# Patient Record
Sex: FEMALE | Race: WHITE | NOT HISPANIC OR LATINO | Employment: OTHER | URBAN - NONMETROPOLITAN AREA
[De-identification: names, ages, dates, MRNs, and addresses within clinical notes are randomized per-mention and may not be internally consistent; named-entity substitution may affect disease eponyms.]

---

## 2017-01-10 ENCOUNTER — OFFICE VISIT (OUTPATIENT)
Dept: CARDIOLOGY | Facility: CLINIC | Age: 67
End: 2017-01-10

## 2017-01-10 VITALS
BODY MASS INDEX: 27.14 KG/M2 | WEIGHT: 159 LBS | DIASTOLIC BLOOD PRESSURE: 72 MMHG | HEART RATE: 71 BPM | HEIGHT: 64 IN | SYSTOLIC BLOOD PRESSURE: 140 MMHG

## 2017-01-10 DIAGNOSIS — I25.10 CORONARY ARTERY DISEASE INVOLVING NATIVE CORONARY ARTERY OF NATIVE HEART WITHOUT ANGINA PECTORIS: ICD-10-CM

## 2017-01-10 DIAGNOSIS — I10 ESSENTIAL HYPERTENSION: ICD-10-CM

## 2017-01-10 DIAGNOSIS — R07.82 INTERCOSTAL PAIN: Primary | ICD-10-CM

## 2017-01-10 DIAGNOSIS — E78.5 HYPERLIPIDEMIA, UNSPECIFIED HYPERLIPIDEMIA TYPE: ICD-10-CM

## 2017-01-10 DIAGNOSIS — R06.00 DYSPNEA, UNSPECIFIED TYPE: ICD-10-CM

## 2017-01-10 PROCEDURE — 93000 ELECTROCARDIOGRAM COMPLETE: CPT | Performed by: INTERNAL MEDICINE

## 2017-01-10 PROCEDURE — 99213 OFFICE O/P EST LOW 20 MIN: CPT | Performed by: INTERNAL MEDICINE

## 2017-01-10 RX ORDER — PANTOPRAZOLE SODIUM 40 MG/1
40 TABLET, DELAYED RELEASE ORAL 2 TIMES DAILY
COMMUNITY

## 2017-01-10 NOTE — PROGRESS NOTES
Fouzia Maldonado  66 y.o. female    01/10/2017  1. Intercostal pain    2. Coronary artery disease involving native coronary artery of native heart without angina pectoris    3. Hyperlipidemia, unspecified hyperlipidemia type    4. Essential hypertension    5. Dyspnea, unspecified type        History of Present Illness: Reason is her routine follow-up with her change in clinical conditions    66 years old female patient with history of for nonobstructive coronary artery disease documented by cardiac catheterization hypertension hyperlipidemia and history of osteoarthritis on appropriate medical management.  The patient to got admitted to [facility] then transferred to the [facility] facility for evaluations of GI bleeding and pancreatitis.  She got a couple unit of packed RBC as she found a significant anemic.  Forward last month or 2 the patient's significant symptoms of fatigability and shortness of breath with a mild exertional activity also complaining of chest pain even though pedis to be atypical from clinical descriptions.  She denies any orthopnea PND denies any nauseous vomiting diarrhea.  Denies any polydipsia polyuria.  Denies any intermittent claudication.  She had significance spinal arthritis with spinal stenosis.  Last echo in 2014 reported abnormal left and a systolic function with diastolic dysfunction and moderately dilated left atrium with mild tricuspid regurgitation with right ventricle systolic pressure proximate 30 mmHg.        SUBJECTIVE    Allergies   Allergen Reactions   • Amoxicillin Rash   • Darvon [Propoxyphene] Rash         Past Medical History   Diagnosis Date   • Coronary atherosclerosis of native coronary artery    • Hyperlipidemia    • Hypertension    • Nonrheumatic mitral valve disorder    • Pancreatitis    • Precordial pain    • Preprocedural cardiovascular examination          Past Surgical History   Procedure Laterality Date   • Cardiac catheterization     • Knee  surgery     • Hysterectomy     • Tubal abdominal ligation           Family History   Problem Relation Age of Onset   • Heart disease Mother    • Cancer Mother    • Heart disease Father          Social History     Social History   • Marital status:      Spouse name: N/A   • Number of children: N/A   • Years of education: N/A     Occupational History   • Not on file.     Social History Main Topics   • Smoking status: Former Smoker   • Smokeless tobacco: Never Used   • Alcohol use No   • Drug use: No   • Sexual activity: Defer     Other Topics Concern   • Not on file     Social History Narrative         Current Outpatient Prescriptions   Medication Sig Dispense Refill   • amLODIPine (NORVASC) 5 MG tablet Take 5 mg by mouth Daily.     • aspirin 81 MG chewable tablet Chew 81 mg Daily.     • enalapril (VASOTEC) 5 MG tablet Take 5 mg by mouth 2 (Two) Times a Day.     • Flaxseed, Linseed, (FLAXSEED OIL) 1000 MG capsule Take  by mouth.     • gabapentin (NEURONTIN) 300 MG capsule Take 300 mg by mouth 3 (Three) Times a Day.     • gemfibrozil (LOPID) 600 MG tablet Take 600 mg by mouth Daily.     • hydrochlorothiazide (MICROZIDE) 12.5 MG capsule Take 12.5 mg by mouth Daily.     • meloxicam (MOBIC) 15 MG tablet Take 15 mg by mouth Daily.     • omeprazole (priLOSEC) 20 MG capsule Take 20 mg by mouth Daily.     • pantoprazole (PROTONIX) 40 MG EC tablet Take 40 mg by mouth Daily.     • PARoxetine (PAXIL) 20 MG tablet Take 20 mg by mouth Every Morning.     • pravastatin (PRAVACHOL) 40 MG tablet Take 40 mg by mouth Daily.     • ranolazine (RANEXA) 500 MG 12 hr tablet Take  by mouth. 1 tablet each morning and 2 tablets QHS     • sucralfate (CARAFATE) 1 G tablet TAKE 1 TABLET FOUR TIMES DAILY 360 tablet 3   • vitamin D (ERGOCALCIFEROL) 90339 UNITS capsule capsule Take 50,000 Units by mouth 1 (One) Time Per Week.       No current facility-administered medications for this visit.          OBJECTIVE    Visit Vitals   • /72   •  "Pulse 71   • Ht 64\" (162.6 cm)   • Wt 159 lb (72.1 kg)   • BMI 27.29 kg/m2           Review of Systems     Constitutional:  Denies recent weight loss, weight gain, fever or chills, no change in exercise tolerance     HENT:  Denies any hearing loss, epistaxis, hoarseness, or difficulty speaking.     Eyes: Wears eyeglasses or contact lenses     Respiratory:   dyspnea with exertion     Cardiovascular: SEE HPI    Gastrointestinal:  Denies change in bowel habits, dyspepsia, ulcer disease, hematochezia, or melena.     Endocrine: Negative for cold intolerance, heat intolerance, polydipsia, polyphagia and polyuria. Denies any history of weight change, heat/cold intolerance, polydipsia, polyuria     Genitourinary: Negative.      Musculoskeletal: Denies any history of arthritic symptoms or back problems     Skin:  Denies any change in hair or nails, rashes, or skin lesions.     Allergic/Immunologic: Negative.  Negative for environmental allergies, food allergies and immunocompromised state.     Neurological:  Denies any history of recurrent headaches, strokes, TIA, or seizure disorder.     Hematological: Denies any food allergies, seasonal allergies, bleeding disorders, or lymphadenopathy.     Psychiatric/Behavioral: Denies any history of depression, substance abuse, or change in cognitive function.         Physical Exam     Constitutional: Cooperative, alert and oriented, well-developed, well-nourished, in no acute distress.     HENT:   Head: Normocephalic, normal hair patterns, no masses or tenderness.  Ears, Nose, and Throat: No gross abnormalities. No pallor or cyanosis. Dentition good.   Eyes: EOMS intact, PERRL, conjunctivae and lids unremarkable. Fundoscopic exam and visual fields not performed.   Neck: No palpable masses or adenopathy, no thyromegaly, no JVD, carotid pulses are full and equal bilaterally and without  Bruits.     Cardiovascular: Regular rhythm, S1 and S2 normal, no S3 or S4. Apical impulse not " displaced. No murmurs, gallops, or rubs detected.     Pulmonary/Chest: Chest: normal symmetry, no tenderness to palpation, normal respiratory excursion, no intercostal retraction, no use of accessory muscles.            Pulmonary: Normal breath sounds. No rales or ronchi.    Abdominal: Abdomen soft, bowel sounds normoactive, no masses, no hepatosplenomegaly, non-tender, no bruits.     Musculoskeletal: No deformities, clubbing, cyanosis, erythema, or edema observed. There are no spinal abnormalities noted. Normal muscle strength and tone. Pulses full and equal in all extremities, no bruits auscultated.     Neurological: No gross motor or sensory deficits noted, affect appropriate, oriented to time, person, place.     Skin: Warm and dry to the touch, no apparent skin lesions or masses noted.     Psychiatric: She has a normal mood and affect. Her behavior is normal. Judgment and thought content normal.           ECG 12 Lead  Date/Time: 1/10/2017 3:25 PM  Performed by: JARRET LAI  Authorized by: JARRET LAI   Comparison: not compared with previous ECG   Rhythm: sinus rhythm  Clinical impression: normal ECG              Lab Results   Component Value Date    WBC 5.8 06/26/2016    HGB 6.3 (L) 06/26/2016    HCT 20.5 (L) 06/26/2016    MCV 64.9 (L) 06/26/2016     06/26/2016     Lab Results   Component Value Date    GLUCOSE 89 06/26/2016    BUN 17 06/26/2016    CREATININE 1.1 (H) 06/26/2016    CO2 24 06/26/2016    CALCIUM 9.4 06/26/2016    ALBUMIN 3.9 06/26/2016    AST 16 06/26/2016    ALT 27 06/26/2016     No results found for: CHOL  No results found for: TRIG  No results found for: HDL  No results found for: LDLCALC  No results found for: LDL  No results found for: HDLLDLRATIO  No components found for: CHOLHDL  No results found for: HGBA1C  Lab Results   Component Value Date    TSH 2.63 03/03/2014           ASSESSMENT AND PLAN  Clinically there is no sign of any acute cardiac decompensation based the clinical  history physical finding.  No evidence of active ischemia.  EKG normal sinus rhythm.  Given the significant change in intracranial clinical condition and described above and history of nonobstructive coronary to disease with the resector hypertension hyperlipidemia seemed inappropriate to risk stratify with a Lexiscan Cardiolite given the significant osteoarthritis.  I will also get an echocardiographic study.  Given the history of GI bleed I will arrange a CBC and BMP.  At present the patient will continue amlodipine 5 mg and aspirin.  Vasotec for hypertension with good blood pressure control.  She is on gemfibrozil for hyperlipidemia and hydrochlorothiazide for hypertension.  Pantoprazole for gastritis and pravastatin for hyperlipidemia along with Ranexa with history of nonobstructive coronary artery disease.  We'll like to see her back within a month R depends on patient clinical condition and/or cardiac workup.    Diagnoses and all orders for this visit:    Intercostal pain  -     ECG 12 Lead  -     Regadenoson Stress Test With Myocardial Perfusion SPECT (Multi Study); Future  -     Basic Metabolic Panel    Coronary artery disease involving native coronary artery of native heart without angina pectoris  -     ECG 12 Lead  -     Adult Transthoracic Echo Complete  -     Regadenoson Stress Test With Myocardial Perfusion SPECT (Multi Study); Future  -     CBC (No Diff); Future    Hyperlipidemia, unspecified hyperlipidemia type    Essential hypertension  -     ECG 12 Lead  -     Adult Transthoracic Echo Complete  -     Regadenoson Stress Test With Myocardial Perfusion SPECT (Multi Study); Future    Dyspnea, unspecified type  -     ECG 12 Lead  -     Adult Transthoracic Echo Complete  -     Regadenoson Stress Test With Myocardial Perfusion SPECT (Multi Study); Future  -     CBC (No Diff); Future  -     Basic Metabolic Panel        Robbin Matthews MD  1/10/2017  3:20 PM

## 2017-01-10 NOTE — MR AVS SNAPSHOT
Fouzia Pryoralice Lyonss   1/10/2017 2:15 PM   Office Visit    Dept Phone:  [phone]   Encounter #:  [id number]    Provider:  Robbin Matthews MD   Department:  [facility] CARDIOLOGY                Your Full Care Plan              Your Updated Medication List          This list is accurate as of: 1/10/17  3:17 PM.  Always use your most recent med list.                amLODIPine 5 MG tablet   Commonly known as:  NORVASC       aspirin 81 MG chewable tablet       enalapril 5 MG tablet   Commonly known as:  VASOTEC       Flaxseed Oil 1000 MG capsule       gabapentin 300 MG capsule   Commonly known as:  NEURONTIN       hydrochlorothiazide 12.5 MG capsule   Commonly known as:  MICROZIDE       LOPID 600 MG tablet   Generic drug:  gemfibrozil       meloxicam 15 MG tablet   Commonly known as:  MOBIC       omeprazole 20 MG capsule   Commonly known as:  priLOSEC       pantoprazole 40 MG EC tablet   Commonly known as:  PROTONIX       PARoxetine 20 MG tablet   Commonly known as:  PAXIL       pravastatin 40 MG tablet   Commonly known as:  PRAVACHOL       ranolazine 500 MG 12 hr tablet   Commonly known as:  RANEXA       sucralfate 1 G tablet   Commonly known as:  CARAFATE   TAKE 1 TABLET FOUR TIMES DAILY       vitamin D 09207 UNITS capsule capsule   Commonly known as:  ERGOCALCIFEROL               You Were Diagnosed With        Codes Comments    Intercostal pain    -  Primary ICD-10-CM: R07.82  ICD-9-CM: 786.59     Coronary artery disease involving native coronary artery of native heart without angina pectoris     ICD-10-CM: I25.10  ICD-9-CM: 414.01     Hyperlipidemia, unspecified hyperlipidemia type     ICD-10-CM: E78.5  ICD-9-CM: 272.4     Essential hypertension     ICD-10-CM: I10  ICD-9-CM: 401.9     Dyspnea, unspecified type     ICD-10-CM: R06.00  ICD-9-CM: 786.09       Instructions     None    Patient Instructions History      Upcoming Appointments     Visit Type Date Time Department    "   OFFICE VISIT 1/10/2017  2:15 PM [facility]    RADIOLOGY 2017  8:00 AM [facility]    OFFICE VISIT 2017  2:00 PM [facility]      MyChart Signup     [facility] [employer] allows you to send messages to your doctor, view your test results, renew your prescriptions, schedule appointments, and more. To sign up, go to [employer] and click on the Sign Up Now link in the New User? box. Enter your [employer] Activation Code exactly as it appears below along with the last four digits of your Social Security Number and your Date of Birth () to complete the sign-up process. If you do not sign up before the expiration date, you must request a new code.    [employer] Activation Code: [email or web address]  Expires: 2017  3:17 PM    If you have questions, you can email [email or web address] or call [phone] to talk to our [employer] staff. Remember, [employer] is NOT to be used for urgent needs. For medical emergencies, dial 911.               Other Info from Your Visit           Your Appointments     2017  8:00 AM CST   RADIOLOGY with [facility] CARDIOLOGY (--)    [address]  [facility] [address] [phone]            2017  2:00 PM CST   Office Visit with Robbin Matthews MD   [facility] CARDIOLOGY (--)    [address]  [facility] [id number] [phone]           Arrive 15 minutes prior to appointment.              Allergies     Amoxicillin  Rash    Darvon [Propoxyphene]  Rash      Vital Signs     Blood Pressure Pulse Height Weight Body Mass Index Smoking Status    140/72 71 64\" (162.6 cm) 159 lb (72.1 kg) 27.29 kg/m2 Former Smoker      Problems and Diagnoses Noted     Coronary artery disease involving native coronary artery of native heart without angina pectoris    Shortness of breath    High blood pressure    High cholesterol or triglycerides    Chest " pain    Pancreas inflammation

## 2017-01-15 ENCOUNTER — RESULTS ENCOUNTER (OUTPATIENT)
Dept: CARDIOLOGY | Facility: CLINIC | Age: 67
End: 2017-01-15

## 2017-01-15 DIAGNOSIS — I25.10 CORONARY ARTERY DISEASE INVOLVING NATIVE CORONARY ARTERY OF NATIVE HEART WITHOUT ANGINA PECTORIS: ICD-10-CM

## 2017-01-15 DIAGNOSIS — R06.00 DYSPNEA, UNSPECIFIED TYPE: ICD-10-CM

## 2017-01-19 ENCOUNTER — TRANSCRIBE ORDERS (OUTPATIENT)
Dept: CARDIOLOGY | Facility: CLINIC | Age: 67
End: 2017-01-19

## 2017-01-19 DIAGNOSIS — R06.02 SHORTNESS OF BREATH: ICD-10-CM

## 2017-01-19 DIAGNOSIS — I10 ESSENTIAL HYPERTENSION: ICD-10-CM

## 2017-01-19 DIAGNOSIS — I25.10 CORONARY ARTERY DISEASE INVOLVING NATIVE CORONARY ARTERY OF NATIVE HEART WITHOUT ANGINA PECTORIS: Primary | ICD-10-CM

## 2017-01-20 ENCOUNTER — TRANSCRIBE ORDERS (OUTPATIENT)
Dept: CARDIOLOGY | Facility: CLINIC | Age: 67
End: 2017-01-20

## 2017-02-01 ENCOUNTER — APPOINTMENT (OUTPATIENT)
Dept: CARDIOLOGY | Facility: CLINIC | Age: 67
End: 2017-02-01

## 2017-02-01 DIAGNOSIS — R06.02 SOB (SHORTNESS OF BREATH) ON EXERTION: Primary | ICD-10-CM

## 2017-02-01 RX ADMIN — Medication 1 DOSE: at 10:49

## 2017-02-07 ENCOUNTER — DOCUMENTATION (OUTPATIENT)
Dept: CARDIOLOGY | Facility: CLINIC | Age: 67
End: 2017-02-07

## 2017-02-09 RX ORDER — MELOXICAM 15 MG/1
TABLET ORAL
Qty: 90 TABLET | Refills: 4 | Status: SHIPPED | OUTPATIENT
Start: 2017-02-09 | End: 2017-03-28 | Stop reason: HOSPADM

## 2017-03-20 ENCOUNTER — OFFICE VISIT (OUTPATIENT)
Dept: CARDIOLOGY | Facility: CLINIC | Age: 67
End: 2017-03-20

## 2017-03-20 ENCOUNTER — DOCUMENTATION (OUTPATIENT)
Dept: CARDIOLOGY | Facility: CLINIC | Age: 67
End: 2017-03-20

## 2017-03-20 VITALS
DIASTOLIC BLOOD PRESSURE: 100 MMHG | SYSTOLIC BLOOD PRESSURE: 180 MMHG | WEIGHT: 150 LBS | HEART RATE: 76 BPM | HEIGHT: 64 IN | BODY MASS INDEX: 25.61 KG/M2

## 2017-03-20 DIAGNOSIS — I10 ESSENTIAL HYPERTENSION: ICD-10-CM

## 2017-03-20 DIAGNOSIS — R06.00 DYSPNEA, UNSPECIFIED TYPE: Primary | ICD-10-CM

## 2017-03-20 DIAGNOSIS — R07.82 INTERCOSTAL PAIN: ICD-10-CM

## 2017-03-20 DIAGNOSIS — E78.5 HYPERLIPIDEMIA, UNSPECIFIED HYPERLIPIDEMIA TYPE: ICD-10-CM

## 2017-03-20 DIAGNOSIS — I34.0 NON-RHEUMATIC MITRAL REGURGITATION: ICD-10-CM

## 2017-03-20 DIAGNOSIS — I25.10 CORONARY ARTERY DISEASE INVOLVING NATIVE CORONARY ARTERY OF NATIVE HEART WITHOUT ANGINA PECTORIS: ICD-10-CM

## 2017-03-20 PROCEDURE — 99213 OFFICE O/P EST LOW 20 MIN: CPT | Performed by: INTERNAL MEDICINE

## 2017-03-20 RX ORDER — RANOLAZINE 500 MG/1
500 TABLET, EXTENDED RELEASE ORAL 2 TIMES DAILY
Qty: 270 TABLET | Refills: 3 | Status: SHIPPED | OUTPATIENT
Start: 2017-03-20 | End: 2018-07-17 | Stop reason: ALTCHOICE

## 2017-03-20 RX ORDER — AMLODIPINE BESYLATE 5 MG/1
5 TABLET ORAL DAILY
COMMUNITY
Start: 2017-03-20 | End: 2019-09-06

## 2017-03-20 NOTE — PROGRESS NOTES
Fouzia Maldonado  66 y.o. female    03/20/2017  1. Dyspnea, unspecified type    2. Coronary artery disease involving native coronary artery of native heart without angina pectoris    3. Intercostal pain    4. Hyperlipidemia, unspecified hyperlipidemia type    5. Essential hypertension    6. Non-rheumatic mitral regurgitation        History of Present Illness:  Routine follow-up    66 years old patient with history of hypertension hypertensive heart disease mild-to-moderate regurgitation recent echocardiographic study history of nonobstructive coronary to disease underwent recent stress test reported abnormal left and a systolic function no evidence of ischemia.  The patient had history of pancreatitis complicated with GI bleed and managed at [address] [place] [address].  She continued sick in the stomach with symptom of nauseous  dyspeptic symptom and and discomfort in the epigastric area.  She denies any typical chest pain.  Denies any orthopnea PND or become short-winded with a moderate level of physical activity possibility of physical deconditioning cannot be excluded.  Her blood pressure between the higher side.  She is taking the amlodipine and Vasotec.  She preferred to be evaluated regarding her GI symptom at [place]           SUBJECTIVE    Allergies   Allergen Reactions   • Amoxicillin Rash   • Darvon [Propoxyphene] Rash         Past Medical History   Diagnosis Date   • Coronary atherosclerosis of native coronary artery    • Hyperlipidemia    • Hypertension    • Nonrheumatic mitral valve disorder    • Pancreatitis    • Precordial pain    • Preprocedural cardiovascular examination          Past Surgical History   Procedure Laterality Date   • Cardiac catheterization     • Knee surgery     • Hysterectomy     • Tubal abdominal ligation           Family History   Problem Relation Age of Onset   • Heart disease Mother    • Cancer Mother    • Heart disease Father          Social History     Social History   • Marital  "status:      Spouse name: N/A   • Number of children: N/A   • Years of education: N/A     Occupational History   • Not on file.     Social History Main Topics   • Smoking status: Former Smoker   • Smokeless tobacco: Never Used   • Alcohol use No   • Drug use: No   • Sexual activity: Defer     Other Topics Concern   • Not on file     Social History Narrative         Current Outpatient Prescriptions   Medication Sig Dispense Refill   • amLODIPine (NORVASC) 5 MG tablet Take 5 mg by mouth Daily.     • aspirin 81 MG chewable tablet Chew 81 mg Daily.     • enalapril (VASOTEC) 5 MG tablet Take 5 mg by mouth 2 (Two) Times a Day.     • Flaxseed, Linseed, (FLAXSEED OIL) 1000 MG capsule Take  by mouth.     • gabapentin (NEURONTIN) 300 MG capsule Take 300 mg by mouth 3 (Three) Times a Day.     • gemfibrozil (LOPID) 600 MG tablet Take 600 mg by mouth Daily.     • hydrochlorothiazide (MICROZIDE) 12.5 MG capsule Take 12.5 mg by mouth Daily.     • meloxicam (MOBIC) 15 MG tablet TAKE 1 TABLET EVERY DAY WITH A MEAL 90 tablet 4   • omeprazole (priLOSEC) 20 MG capsule Take 20 mg by mouth Daily.     • pantoprazole (PROTONIX) 40 MG EC tablet Take 40 mg by mouth Daily.     • PARoxetine (PAXIL) 20 MG tablet Take 20 mg by mouth Every Morning.     • pravastatin (PRAVACHOL) 40 MG tablet Take 40 mg by mouth Daily.     • ranolazine (RANEXA) 500 MG 12 hr tablet Take  by mouth. 1 tablet each morning and 2 tablets QHS     • sucralfate (CARAFATE) 1 G tablet TAKE 1 TABLET FOUR TIMES DAILY 360 tablet 3   • vitamin D (ERGOCALCIFEROL) 55746 UNITS capsule capsule Take 50,000 Units by mouth 1 (One) Time Per Week.       No current facility-administered medications for this visit.          OBJECTIVE    Visit Vitals   • /100   • Pulse 76   • Ht 64\" (162.6 cm)   • Wt 150 lb (68 kg)   • BMI 25.75 kg/m2           Review of Systems     Constitutional:  Denies recent weight loss, weight gain, fever or chills, no change in exercise tolerance   "   HENT:  Denies any hearing loss, epistaxis, hoarseness, or difficulty speaking.     Eyes: Wears eyeglasses or contact lenses     Respiratory: dyspnea with exertion    Cardiovascular: See H&P    Gastrointestinal: SEE Hand P     Endocrine: Negative for cold intolerance, heat intolerance, polydipsia, polyphagia and polyuria. Denies any history of weight loss    Genitourinary: Negative.      Musculoskeletal: Denies any history of arthritic symptoms or back problems     Skin:  Denies any change in hair or nails, rashes, or skin lesions.     Allergic/Immunologic: Negative.  Negative for environmental allergies, food allergies and immunocompromised state.     Neurological:  Denies any history of recurrent headaches, strokes, TIA, or seizure disorder.     Hematological: Denies any food allergies, seasonal allergies, bleeding disorders, or lymphadenopathy.     Psychiatric/Behavioral: Denies any history of depression, substance abuse, or change in cognitive function.         Physical Exam     Constitutional: Cooperative, alert and oriented, well-developed, well-nourished, in no acute distress.     HENT:   Head: Normocephalic, normal hair patterns, no masses or tenderness.  Ears, Nose, and Throat: No gross abnormalities. No pallor or cyanosis. Dentition good.   Eyes: EOMS intact, PERRL, conjunctivae and lids unremarkable. Fundoscopic exam and visual fields not performed.   Neck: No palpable masses or adenopathy, no thyromegaly, no JVD, carotid pulses are full and equal bilaterally and without  Bruits.     Cardiovascular: Regular rhythm, S1 and S2 normal, no S3 or S4. Apical impulse not displaced. No murmurs, gallops, or rubs detected.     Pulmonary/Chest: Chest: normal symmetry, no tenderness to palpation, normal respiratory excursion, no intercostal retraction, no use of accessory muscles.            Pulmonary: Normal breath sounds. No rales or ronchi.    Abdominal: Abdomen soft, bowel sounds normoactive, no masses, no  hepatosplenomegaly, non-tender, no bruits.     Musculoskeletal: No deformities, clubbing, cyanosis, erythema, or edema observed. There are no spinal abnormalities noted. Normal muscle strength and tone. Pulses full and equal in all extremities, no bruits auscultated.     Neurological: No gross motor or sensory deficits noted, affect appropriate, oriented to time, person, place.     Skin: Warm and dry to the touch, no apparent skin lesions or masses noted.     Psychiatric: She has a normal mood and affect. Her behavior is normal. Judgment and thought content normal.         Procedures      Lab Results   Component Value Date    WBC 8.8 01/10/2017    HGB 8.0 (L) 01/10/2017    HCT 25.5 (L) 01/10/2017    MCV 68.7 (L) 01/10/2017     01/10/2017     Lab Results   Component Value Date    GLUCOSE 95 01/10/2017    BUN 18 01/10/2017    CREATININE 1.2 (H) 01/10/2017    CO2 25 01/10/2017    CALCIUM 9.2 01/10/2017    ALBUMIN 3.9 06/26/2016    AST 16 06/26/2016    ALT 27 06/26/2016     No results found for: CHOL  No results found for: TRIG  No results found for: HDL  No results found for: LDLCALC  No results found for: LDL  No results found for: HDLLDLRATIO  No components found for: CHOLHDL  No results found for: HGBA1C  Lab Results   Component Value Date    TSH 2.63 03/03/2014           ASSESSMENT AND PLAN  #1 history of nonobstructive coronary to disease within normal recent stress test #2 hypertension with hypertensive heart disease #3 mild to moderate mitral regurgitation.  #4 dyspnea on exertion may be multifactorial needed in etiology.  #5 history of for dyspepsia/gastritis with previous history of pancreatitis complicated with GI bleed    Clinically there wasn't enough for any acute cardiac decompensation based on the clinical history physical finding.  The shortness breath and admitted maybe multifactorial the possibility of physical deconditioning cannot be excluded.  100 mg complaint in the sickness in the  stomach/nauseous feeling and at present being treated with Carafate and Prilosec.  She is a resident of [facility] by pressure to be followed up with the GI and medicine well.  Contacted Dr. Esquivel's office.  Sent abnormal stress test normal left and a systolic function with mild-to-moderate mitral regurgitations.  Her blood pressures between the higher side I will also patient increase amlodipine from 5-10 mg and she will continue Vasotec 5 mg.  She will also continue HCl 2.5 mg Ranexa 500 mg twice a day and her stomach medication as before.  We'll lack to see her back in 6 month R depends on patient clinical conditions.    Diagnoses and all orders for this visit:    Dyspnea, unspecified type    Coronary artery disease involving native coronary artery of native heart without angina pectoris    Intercostal pain    Hyperlipidemia, unspecified hyperlipidemia type    Essential hypertension    Non-rheumatic mitral regurgitation        Robbin Matthews MD  3/20/2017  2:18 PM

## 2017-03-24 ENCOUNTER — DOCUMENTATION (OUTPATIENT)
Dept: CARDIOLOGY | Facility: CLINIC | Age: 67
End: 2017-03-24

## 2017-03-24 ENCOUNTER — APPOINTMENT (OUTPATIENT)
Dept: CT IMAGING | Facility: HOSPITAL | Age: 67
End: 2017-03-24

## 2017-03-24 ENCOUNTER — HOSPITAL ENCOUNTER (INPATIENT)
Facility: HOSPITAL | Age: 67
LOS: 4 days | Discharge: HOME | End: 2017-03-28
Attending: FAMILY MEDICINE | Admitting: FAMILY MEDICINE

## 2017-03-24 DIAGNOSIS — D62 ACUTE BLOOD LOSS ANEMIA: Primary | ICD-10-CM

## 2017-03-24 PROBLEM — M19.90 ARTHRITIS: Status: ACTIVE | Noted: 2017-03-24

## 2017-03-24 PROBLEM — R10.10 PAIN OF UPPER ABDOMEN: Status: ACTIVE | Noted: 2017-03-24

## 2017-03-24 LAB
ABO GROUP BLD: NORMAL
ALBUMIN SERPL-MCNC: 4.2 G/DL (ref 3.4–4.8)
ALBUMIN SERPL-MCNC: 4.2 G/DL (ref 3.4–4.8)
ALBUMIN/GLOB SERPL: 1.6 G/DL (ref 1.1–1.8)
ALBUMIN/GLOB SERPL: 1.7 G/DL (ref 1.1–1.8)
ALP SERPL-CCNC: 74 U/L (ref 38–126)
ALP SERPL-CCNC: 75 U/L (ref 38–126)
ALT SERPL W P-5'-P-CCNC: 16 U/L (ref 9–52)
ALT SERPL W P-5'-P-CCNC: 20 U/L (ref 9–52)
AMYLASE SERPL-CCNC: 85 U/L (ref 50–130)
ANION GAP SERPL CALCULATED.3IONS-SCNC: 11 MMOL/L (ref 5–15)
ANION GAP SERPL CALCULATED.3IONS-SCNC: 12 MMOL/L (ref 5–15)
ANISOCYTOSIS BLD QL: ABNORMAL
ANISOCYTOSIS BLD QL: NORMAL
AST SERPL-CCNC: 34 U/L (ref 14–36)
AST SERPL-CCNC: 41 U/L (ref 14–36)
BASOPHILS # BLD MANUAL: 0.06 10*3/MM3 (ref 0–0.2)
BASOPHILS NFR BLD AUTO: 1 % (ref 0–2)
BILIRUB SERPL-MCNC: 0.3 MG/DL (ref 0.2–1.3)
BILIRUB SERPL-MCNC: 0.3 MG/DL (ref 0.2–1.3)
BLD GP AB SCN SERPL QL: NEGATIVE
BUN BLD-MCNC: 13 MG/DL (ref 7–21)
BUN BLD-MCNC: 14 MG/DL (ref 7–21)
BUN/CREAT SERPL: 14 (ref 7–25)
BUN/CREAT SERPL: 15.2 (ref 7–25)
CALCIUM SPEC-SCNC: 8.9 MG/DL (ref 8.4–10.2)
CALCIUM SPEC-SCNC: 9 MG/DL (ref 8.4–10.2)
CHLORIDE SERPL-SCNC: 100 MMOL/L (ref 95–110)
CHLORIDE SERPL-SCNC: 101 MMOL/L (ref 95–110)
CO2 SERPL-SCNC: 26 MMOL/L (ref 22–31)
CO2 SERPL-SCNC: 26 MMOL/L (ref 22–31)
CREAT BLD-MCNC: 0.92 MG/DL (ref 0.5–1)
CREAT BLD-MCNC: 0.93 MG/DL (ref 0.5–1)
DEPRECATED RDW RBC AUTO: 40.8 FL (ref 36.4–46.3)
DEPRECATED RDW RBC AUTO: 41.3 FL (ref 36.4–46.3)
EOSINOPHIL # BLD MANUAL: 0.17 10*3/MM3 (ref 0–0.7)
EOSINOPHIL # BLD MANUAL: 0.58 10*3/MM3 (ref 0–0.7)
EOSINOPHIL NFR BLD MANUAL: 3 % (ref 0–7)
EOSINOPHIL NFR BLD MANUAL: 9 % (ref 0–7)
ERYTHROCYTE [DISTWIDTH] IN BLOOD BY AUTOMATED COUNT: 17.5 % (ref 11.5–14.5)
ERYTHROCYTE [DISTWIDTH] IN BLOOD BY AUTOMATED COUNT: 17.6 % (ref 11.5–14.5)
FERRITIN SERPL-MCNC: 4.8 NG/ML (ref 11.1–264)
FOLATE SERPL-MCNC: 8.93 NG/ML (ref 2.76–21)
GFR SERPL CREATININE-BSD FRML MDRD: 60 ML/MIN/1.73 (ref 60–104)
GFR SERPL CREATININE-BSD FRML MDRD: 61 ML/MIN/1.73 (ref 60–104)
GLOBULIN UR ELPH-MCNC: 2.5 GM/DL (ref 2.3–3.5)
GLOBULIN UR ELPH-MCNC: 2.6 GM/DL (ref 2.3–3.5)
GLUCOSE BLD-MCNC: 92 MG/DL (ref 60–100)
GLUCOSE BLD-MCNC: 93 MG/DL (ref 60–100)
HCT VFR BLD AUTO: 20.2 % (ref 35–45)
HCT VFR BLD AUTO: 20.6 % (ref 35–45)
HCT VFR BLD AUTO: 20.8 % (ref 35–45)
HGB BLD-MCNC: 6 G/DL (ref 12–15.5)
HGB BLD-MCNC: 6.1 G/DL (ref 12–15.5)
HGB RETIC QN: 17.7 PG (ref 30–38)
HOLD SPECIMEN: NORMAL
HOLD SPECIMEN: NORMAL
HYPOCHROMIA BLD QL: NORMAL
IMM RETICS NFR: 18.7 % (ref 3–15.9)
INR PPP: 0.98 (ref 0.8–1.2)
IRON 24H UR-MRATE: 10 MCG/DL (ref 37–170)
IRON SATN MFR SERPL: 2 % (ref 15–50)
LARGE PLATELETS: ABNORMAL
LIPASE SERPL-CCNC: 134 U/L (ref 23–300)
LYMPHOCYTES # BLD MANUAL: 1.35 10*3/MM3 (ref 0.6–4.2)
LYMPHOCYTES # BLD MANUAL: 1.38 10*3/MM3 (ref 0.6–4.2)
LYMPHOCYTES NFR BLD MANUAL: 21 % (ref 10–50)
LYMPHOCYTES NFR BLD MANUAL: 24 % (ref 10–50)
LYMPHOCYTES NFR BLD MANUAL: 6 % (ref 0–12)
LYMPHOCYTES NFR BLD MANUAL: 6 % (ref 0–12)
MCH RBC QN AUTO: 18.5 PG (ref 26.5–34)
MCH RBC QN AUTO: 18.8 PG (ref 26.5–34)
MCHC RBC AUTO-ENTMCNC: 29.3 G/DL (ref 31.4–36)
MCHC RBC AUTO-ENTMCNC: 29.7 G/DL (ref 31.4–36)
MCV RBC AUTO: 63 FL (ref 80–98)
MCV RBC AUTO: 63.3 FL (ref 80–98)
MICROCYTES BLD QL: ABNORMAL
MONOCYTES # BLD AUTO: 0.34 10*3/MM3 (ref 0–0.9)
MONOCYTES # BLD AUTO: 0.39 10*3/MM3 (ref 0–0.9)
NEUTROPHILS # BLD AUTO: 3.78 10*3/MM3 (ref 2–8.6)
NEUTROPHILS # BLD AUTO: 4.13 10*3/MM3 (ref 2–8.6)
NEUTROPHILS NFR BLD MANUAL: 64 % (ref 37–80)
NEUTROPHILS NFR BLD MANUAL: 66 % (ref 37–80)
NEUTS HYPERSEG # BLD: ABNORMAL 10*3/UL
PLATELET # BLD AUTO: 445 10*3/MM3 (ref 150–450)
PLATELET # BLD AUTO: 462 10*3/MM3 (ref 150–450)
PMV BLD AUTO: 8.9 FL (ref 8–12)
PMV BLD AUTO: 9.1 FL (ref 8–12)
POIKILOCYTOSIS BLD QL SMEAR: NORMAL
POTASSIUM BLD-SCNC: 3.8 MMOL/L (ref 3.5–5.1)
POTASSIUM BLD-SCNC: 3.9 MMOL/L (ref 3.5–5.1)
PROT SERPL-MCNC: 6.7 G/DL (ref 6.3–8.6)
PROT SERPL-MCNC: 6.8 G/DL (ref 6.3–8.6)
PROTHROMBIN TIME: 12.9 SECONDS (ref 11.1–15.3)
RBC # BLD AUTO: 3.19 10*6/MM3 (ref 3.77–5.16)
RBC # BLD AUTO: 3.3 10*6/MM3 (ref 3.77–5.16)
RETICS #: 0.06 10*6/MM3 (ref 0.03–0.12)
RETICS/RBC NFR AUTO: 1.93 % (ref 0.63–2.13)
RETICULOCYTE PRODUCTION INDEX: 0.4 % (ref 0.63–2.13)
RH BLD: NEGATIVE
SMALL PLATELETS BLD QL SMEAR: NORMAL
SODIUM BLD-SCNC: 137 MMOL/L (ref 137–145)
SODIUM BLD-SCNC: 139 MMOL/L (ref 137–145)
TIBC SERPL-MCNC: 422 MCG/DL (ref 265–497)
VIT B12 BLD-MCNC: 512 PG/ML (ref 239–931)
WBC MORPH BLD: NORMAL
WBC NRBC COR # BLD: 5.73 10*3/MM3 (ref 3.2–9.8)
WBC NRBC COR # BLD: 6.45 10*3/MM3 (ref 3.2–9.8)
WHOLE BLOOD HOLD SPECIMEN: NORMAL
WHOLE BLOOD HOLD SPECIMEN: NORMAL

## 2017-03-24 PROCEDURE — 36430 TRANSFUSION BLD/BLD COMPNT: CPT

## 2017-03-24 PROCEDURE — 25010000002 HYDRALAZINE PER 20 MG: Performed by: FAMILY MEDICINE

## 2017-03-24 PROCEDURE — 86901 BLOOD TYPING SEROLOGIC RH(D): CPT | Performed by: FAMILY MEDICINE

## 2017-03-24 PROCEDURE — 25010000002 ONDANSETRON PER 1 MG: Performed by: FAMILY MEDICINE

## 2017-03-24 PROCEDURE — 82728 ASSAY OF FERRITIN: CPT | Performed by: FAMILY MEDICINE

## 2017-03-24 PROCEDURE — 86900 BLOOD TYPING SEROLOGIC ABO: CPT | Performed by: FAMILY MEDICINE

## 2017-03-24 PROCEDURE — P9021 RED BLOOD CELLS UNIT: HCPCS

## 2017-03-24 PROCEDURE — 85025 COMPLETE CBC W/AUTO DIFF WBC: CPT | Performed by: FAMILY MEDICINE

## 2017-03-24 PROCEDURE — 85007 BL SMEAR W/DIFF WBC COUNT: CPT | Performed by: FAMILY MEDICINE

## 2017-03-24 PROCEDURE — 82150 ASSAY OF AMYLASE: CPT | Performed by: FAMILY MEDICINE

## 2017-03-24 PROCEDURE — 82607 VITAMIN B-12: CPT | Performed by: FAMILY MEDICINE

## 2017-03-24 PROCEDURE — 85046 RETICYTE/HGB CONCENTRATE: CPT | Performed by: FAMILY MEDICINE

## 2017-03-24 PROCEDURE — 99284 EMERGENCY DEPT VISIT MOD MDM: CPT

## 2017-03-24 PROCEDURE — 86900 BLOOD TYPING SEROLOGIC ABO: CPT

## 2017-03-24 PROCEDURE — 0 DIATRIZOATE MEGLUMINE & SODIUM PER 1 ML: Performed by: FAMILY MEDICINE

## 2017-03-24 PROCEDURE — 82746 ASSAY OF FOLIC ACID SERUM: CPT | Performed by: FAMILY MEDICINE

## 2017-03-24 PROCEDURE — 80053 COMPREHEN METABOLIC PANEL: CPT | Performed by: FAMILY MEDICINE

## 2017-03-24 PROCEDURE — 83690 ASSAY OF LIPASE: CPT | Performed by: FAMILY MEDICINE

## 2017-03-24 PROCEDURE — 85610 PROTHROMBIN TIME: CPT | Performed by: FAMILY MEDICINE

## 2017-03-24 PROCEDURE — 86850 RBC ANTIBODY SCREEN: CPT | Performed by: FAMILY MEDICINE

## 2017-03-24 PROCEDURE — 83540 ASSAY OF IRON: CPT | Performed by: FAMILY MEDICINE

## 2017-03-24 PROCEDURE — 83550 IRON BINDING TEST: CPT | Performed by: FAMILY MEDICINE

## 2017-03-24 PROCEDURE — 74177 CT ABD & PELVIS W/CONTRAST: CPT

## 2017-03-24 PROCEDURE — 86923 COMPATIBILITY TEST ELECTRIC: CPT

## 2017-03-24 RX ORDER — HYDRALAZINE HYDROCHLORIDE 20 MG/ML
20 INJECTION INTRAMUSCULAR; INTRAVENOUS ONCE
Status: COMPLETED | OUTPATIENT
Start: 2017-03-25 | End: 2017-03-24

## 2017-03-24 RX ORDER — ONDANSETRON 4 MG/1
4 TABLET, FILM COATED ORAL EVERY 6 HOURS PRN
Status: DISCONTINUED | OUTPATIENT
Start: 2017-03-24 | End: 2017-03-28 | Stop reason: HOSPADM

## 2017-03-24 RX ORDER — SODIUM CHLORIDE 0.9 % (FLUSH) 0.9 %
1-10 SYRINGE (ML) INJECTION AS NEEDED
Status: DISCONTINUED | OUTPATIENT
Start: 2017-03-24 | End: 2017-03-28 | Stop reason: HOSPADM

## 2017-03-24 RX ORDER — PRAVASTATIN SODIUM 40 MG
40 TABLET ORAL DAILY
Status: DISCONTINUED | OUTPATIENT
Start: 2017-03-25 | End: 2017-03-28 | Stop reason: HOSPADM

## 2017-03-24 RX ORDER — LABETALOL 100 MG/1
100 TABLET, FILM COATED ORAL EVERY 12 HOURS SCHEDULED
Status: DISCONTINUED | OUTPATIENT
Start: 2017-03-25 | End: 2017-03-28 | Stop reason: HOSPADM

## 2017-03-24 RX ORDER — ONDANSETRON 2 MG/ML
4 INJECTION INTRAMUSCULAR; INTRAVENOUS EVERY 6 HOURS PRN
Status: DISCONTINUED | OUTPATIENT
Start: 2017-03-24 | End: 2017-03-28 | Stop reason: HOSPADM

## 2017-03-24 RX ORDER — AMLODIPINE BESYLATE 10 MG/1
10 TABLET ORAL DAILY
Status: DISCONTINUED | OUTPATIENT
Start: 2017-03-25 | End: 2017-03-28 | Stop reason: HOSPADM

## 2017-03-24 RX ORDER — SODIUM CHLORIDE 0.9 % (FLUSH) 0.9 %
10 SYRINGE (ML) INJECTION AS NEEDED
Status: DISCONTINUED | OUTPATIENT
Start: 2017-03-24 | End: 2017-03-28 | Stop reason: HOSPADM

## 2017-03-24 RX ORDER — RANOLAZINE 500 MG/1
500 TABLET, EXTENDED RELEASE ORAL 2 TIMES DAILY
Status: DISCONTINUED | OUTPATIENT
Start: 2017-03-24 | End: 2017-03-28 | Stop reason: HOSPADM

## 2017-03-24 RX ORDER — LORAZEPAM 2 MG/ML
0.5 INJECTION INTRAMUSCULAR EVERY 6 HOURS PRN
Status: DISCONTINUED | OUTPATIENT
Start: 2017-03-24 | End: 2017-03-28 | Stop reason: HOSPADM

## 2017-03-24 RX ORDER — ASPIRIN 81 MG/1
81 TABLET, CHEWABLE ORAL DAILY
Status: DISCONTINUED | OUTPATIENT
Start: 2017-03-25 | End: 2017-03-28 | Stop reason: HOSPADM

## 2017-03-24 RX ORDER — FUROSEMIDE 10 MG/ML
40 INJECTION INTRAMUSCULAR; INTRAVENOUS ONCE
Status: DISCONTINUED | OUTPATIENT
Start: 2017-03-24 | End: 2017-03-28 | Stop reason: HOSPADM

## 2017-03-24 RX ORDER — FUROSEMIDE 10 MG/ML
20 INJECTION INTRAMUSCULAR; INTRAVENOUS ONCE
Status: COMPLETED | OUTPATIENT
Start: 2017-03-25 | End: 2017-03-25

## 2017-03-24 RX ORDER — SODIUM CHLORIDE 9 MG/ML
100 INJECTION, SOLUTION INTRAVENOUS CONTINUOUS
Status: DISCONTINUED | OUTPATIENT
Start: 2017-03-24 | End: 2017-03-28 | Stop reason: HOSPADM

## 2017-03-24 RX ORDER — HYDRALAZINE HYDROCHLORIDE 20 MG/ML
20 INJECTION INTRAMUSCULAR; INTRAVENOUS EVERY 6 HOURS PRN
Status: DISCONTINUED | OUTPATIENT
Start: 2017-03-24 | End: 2017-03-28 | Stop reason: HOSPADM

## 2017-03-24 RX ORDER — PANTOPRAZOLE SODIUM 40 MG/10ML
40 INJECTION, POWDER, LYOPHILIZED, FOR SOLUTION INTRAVENOUS EVERY 12 HOURS SCHEDULED
Status: COMPLETED | OUTPATIENT
Start: 2017-03-24 | End: 2017-03-27

## 2017-03-24 RX ORDER — SUCRALFATE 1 G/1
1 TABLET ORAL
Status: DISCONTINUED | OUTPATIENT
Start: 2017-03-25 | End: 2017-03-28 | Stop reason: HOSPADM

## 2017-03-24 RX ORDER — ONDANSETRON 4 MG/1
4 TABLET, ORALLY DISINTEGRATING ORAL EVERY 6 HOURS PRN
Status: DISCONTINUED | OUTPATIENT
Start: 2017-03-24 | End: 2017-03-28 | Stop reason: HOSPADM

## 2017-03-24 RX ORDER — PANTOPRAZOLE SODIUM 40 MG/10ML
80 INJECTION, POWDER, LYOPHILIZED, FOR SOLUTION INTRAVENOUS ONCE
Status: COMPLETED | OUTPATIENT
Start: 2017-03-24 | End: 2017-03-24

## 2017-03-24 RX ADMIN — LABETALOL HYDROCHLORIDE 100 MG: 100 TABLET, FILM COATED ORAL at 23:50

## 2017-03-24 RX ADMIN — HYDRALAZINE HYDROCHLORIDE 20 MG: 20 INJECTION INTRAMUSCULAR; INTRAVENOUS at 22:39

## 2017-03-24 RX ADMIN — RANOLAZINE 500 MG: 500 TABLET, FILM COATED, EXTENDED RELEASE ORAL at 22:38

## 2017-03-24 RX ADMIN — HYDRALAZINE HYDROCHLORIDE 20 MG: 20 INJECTION INTRAMUSCULAR; INTRAVENOUS at 23:45

## 2017-03-24 RX ADMIN — DIATRIZOATE MEGLUMINE AND DIATRIZOATE SODIUM 30 ML: 660; 100 LIQUID ORAL; RECTAL at 22:36

## 2017-03-24 RX ADMIN — PANTOPRAZOLE SODIUM 80 MG: 40 INJECTION, POWDER, FOR SOLUTION INTRAVENOUS at 19:36

## 2017-03-24 RX ADMIN — ONDANSETRON 4 MG: 2 INJECTION INTRAMUSCULAR; INTRAVENOUS at 23:30

## 2017-03-24 RX ADMIN — PANTOPRAZOLE SODIUM 40 MG: 40 INJECTION, POWDER, FOR SOLUTION INTRAVENOUS at 21:59

## 2017-03-24 NOTE — PROGRESS NOTES
Attempted to notify pt that Dr Esquivel's office will not accept her as a patient due to her establishment at [facility] in [place] at [facility].

## 2017-03-25 ENCOUNTER — APPOINTMENT (OUTPATIENT)
Dept: GENERAL RADIOLOGY | Facility: HOSPITAL | Age: 67
End: 2017-03-25

## 2017-03-25 ENCOUNTER — APPOINTMENT (OUTPATIENT)
Dept: ULTRASOUND IMAGING | Facility: HOSPITAL | Age: 67
End: 2017-03-25

## 2017-03-25 PROBLEM — R50.9 FEVER: Status: ACTIVE | Noted: 2017-03-25

## 2017-03-25 LAB
BASOPHILS # BLD AUTO: 0.02 10*3/MM3 (ref 0–0.2)
BASOPHILS NFR BLD AUTO: 0.2 % (ref 0–2)
BILIRUB UR QL STRIP: NEGATIVE
CLARITY UR: CLEAR
COLOR UR: YELLOW
CRP SERPL-MCNC: 0.6 MG/DL (ref 0–1)
D-LACTATE SERPL-SCNC: 0.8 MMOL/L (ref 0.5–2)
DEPRECATED RDW RBC AUTO: 49.7 FL (ref 36.4–46.3)
EOSINOPHIL # BLD AUTO: 0.05 10*3/MM3 (ref 0–0.7)
EOSINOPHIL NFR BLD AUTO: 0.5 % (ref 0–7)
ERYTHROCYTE [DISTWIDTH] IN BLOOD BY AUTOMATED COUNT: 20.5 % (ref 11.5–14.5)
GLUCOSE UR STRIP-MCNC: NEGATIVE MG/DL
HCT VFR BLD AUTO: 24.4 % (ref 35–45)
HGB BLD-MCNC: 7.8 G/DL (ref 12–15.5)
HGB UR QL STRIP.AUTO: NEGATIVE
HOLD SPECIMEN: NORMAL
IMM GRANULOCYTES # BLD: 0.02 10*3/MM3 (ref 0–0.02)
IMM GRANULOCYTES NFR BLD: 0.2 % (ref 0–0.5)
KETONES UR QL STRIP: NEGATIVE
LEUKOCYTE ESTERASE UR QL STRIP.AUTO: NEGATIVE
LYMPHOCYTES # BLD AUTO: 1.24 10*3/MM3 (ref 0.6–4.2)
LYMPHOCYTES NFR BLD AUTO: 13.1 % (ref 10–50)
Lab: NORMAL
MCH RBC QN AUTO: 21.2 PG (ref 26.5–34)
MCHC RBC AUTO-ENTMCNC: 32 G/DL (ref 31.4–36)
MCV RBC AUTO: 66.3 FL (ref 80–98)
MONOCYTES # BLD AUTO: 0.44 10*3/MM3 (ref 0–0.9)
MONOCYTES NFR BLD AUTO: 4.6 % (ref 0–12)
NEUTROPHILS # BLD AUTO: 7.72 10*3/MM3 (ref 2–8.6)
NEUTROPHILS NFR BLD AUTO: 81.4 % (ref 37–80)
NITRITE UR QL STRIP: NEGATIVE
PH UR STRIP.AUTO: 7 [PH] (ref 5–9)
PLATELET # BLD AUTO: 374 10*3/MM3 (ref 150–450)
PMV BLD AUTO: 9.5 FL (ref 8–12)
PROT UR QL STRIP: NEGATIVE
RBC # BLD AUTO: 3.68 10*6/MM3 (ref 3.77–5.16)
SP GR UR STRIP: 1.02 (ref 1–1.03)
UROBILINOGEN UR QL STRIP: NORMAL
WBC NRBC COR # BLD: 9.49 10*3/MM3 (ref 3.2–9.8)
WHOLE BLOOD HOLD SPECIMEN: NORMAL

## 2017-03-25 PROCEDURE — 25010000002 ONDANSETRON PER 1 MG: Performed by: FAMILY MEDICINE

## 2017-03-25 PROCEDURE — P9021 RED BLOOD CELLS UNIT: HCPCS

## 2017-03-25 PROCEDURE — 25010000002 LEVOFLOXACIN PER 250 MG: Performed by: FAMILY MEDICINE

## 2017-03-25 PROCEDURE — 76705 ECHO EXAM OF ABDOMEN: CPT

## 2017-03-25 PROCEDURE — 99222 1ST HOSP IP/OBS MODERATE 55: CPT | Performed by: FAMILY MEDICINE

## 2017-03-25 PROCEDURE — 71020 HC CHEST PA AND LATERAL: CPT

## 2017-03-25 PROCEDURE — 86900 BLOOD TYPING SEROLOGIC ABO: CPT

## 2017-03-25 PROCEDURE — 83605 ASSAY OF LACTIC ACID: CPT | Performed by: FAMILY MEDICINE

## 2017-03-25 PROCEDURE — 0 IOPAMIDOL 61 % SOLUTION: Performed by: FAMILY MEDICINE

## 2017-03-25 PROCEDURE — 86140 C-REACTIVE PROTEIN: CPT | Performed by: FAMILY MEDICINE

## 2017-03-25 PROCEDURE — 85025 COMPLETE CBC W/AUTO DIFF WBC: CPT | Performed by: FAMILY MEDICINE

## 2017-03-25 PROCEDURE — 25010000002 HYDRALAZINE PER 20 MG: Performed by: FAMILY MEDICINE

## 2017-03-25 PROCEDURE — 36430 TRANSFUSION BLD/BLD COMPNT: CPT

## 2017-03-25 PROCEDURE — 87040 BLOOD CULTURE FOR BACTERIA: CPT | Performed by: FAMILY MEDICINE

## 2017-03-25 PROCEDURE — 25010000002 FUROSEMIDE PER 20 MG: Performed by: FAMILY MEDICINE

## 2017-03-25 PROCEDURE — 81003 URINALYSIS AUTO W/O SCOPE: CPT | Performed by: FAMILY MEDICINE

## 2017-03-25 RX ORDER — LEVOFLOXACIN 5 MG/ML
750 INJECTION, SOLUTION INTRAVENOUS EVERY 24 HOURS
Status: DISCONTINUED | OUTPATIENT
Start: 2017-03-25 | End: 2017-03-26

## 2017-03-25 RX ORDER — HYDROCHLOROTHIAZIDE 12.5 MG/1
12.5 CAPSULE, GELATIN COATED ORAL DAILY
Status: DISCONTINUED | OUTPATIENT
Start: 2017-03-25 | End: 2017-03-28 | Stop reason: HOSPADM

## 2017-03-25 RX ORDER — DIPHENHYDRAMINE HCL 25 MG
25 TABLET ORAL EVERY 6 HOURS PRN
Status: DISCONTINUED | OUTPATIENT
Start: 2017-03-25 | End: 2017-03-28 | Stop reason: HOSPADM

## 2017-03-25 RX ORDER — FERROUS SULFATE TAB EC 324 MG (65 MG FE EQUIVALENT) 324 (65 FE) MG
324 TABLET DELAYED RESPONSE ORAL
Status: DISCONTINUED | OUTPATIENT
Start: 2017-03-25 | End: 2017-03-28 | Stop reason: HOSPADM

## 2017-03-25 RX ORDER — ACETAMINOPHEN 325 MG/1
650 TABLET ORAL EVERY 6 HOURS PRN
Status: DISCONTINUED | OUTPATIENT
Start: 2017-03-25 | End: 2017-03-28 | Stop reason: HOSPADM

## 2017-03-25 RX ADMIN — SODIUM CHLORIDE 100 ML/HR: 900 INJECTION, SOLUTION INTRAVENOUS at 17:43

## 2017-03-25 RX ADMIN — FERROUS SULFATE TAB EC 324 MG (65 MG FE EQUIVALENT) 324 MG: 324 (65 FE) TABLET DELAYED RESPONSE at 10:22

## 2017-03-25 RX ADMIN — ASPIRIN 81 MG 81 MG: 81 TABLET ORAL at 08:50

## 2017-03-25 RX ADMIN — HYDROCHLOROTHIAZIDE 12.5 MG: 12.5 CAPSULE ORAL at 11:59

## 2017-03-25 RX ADMIN — IOPAMIDOL 100 ML: 612 INJECTION, SOLUTION INTRAVENOUS at 02:30

## 2017-03-25 RX ADMIN — LABETALOL HYDROCHLORIDE 100 MG: 100 TABLET, FILM COATED ORAL at 08:49

## 2017-03-25 RX ADMIN — PANTOPRAZOLE SODIUM 40 MG: 40 INJECTION, POWDER, FOR SOLUTION INTRAVENOUS at 21:25

## 2017-03-25 RX ADMIN — SUCRALFATE 1 G: 1 TABLET ORAL at 17:43

## 2017-03-25 RX ADMIN — FUROSEMIDE 20 MG: 10 INJECTION, SOLUTION INTRAVENOUS at 01:24

## 2017-03-25 RX ADMIN — RANOLAZINE 500 MG: 500 TABLET, FILM COATED, EXTENDED RELEASE ORAL at 08:49

## 2017-03-25 RX ADMIN — ONDANSETRON 4 MG: 2 INJECTION INTRAMUSCULAR; INTRAVENOUS at 14:16

## 2017-03-25 RX ADMIN — SUCRALFATE 1 G: 1 TABLET ORAL at 08:50

## 2017-03-25 RX ADMIN — LEVOFLOXACIN 750 MG: 5 INJECTION, SOLUTION INTRAVENOUS at 10:22

## 2017-03-25 RX ADMIN — PRAVASTATIN SODIUM 40 MG: 40 TABLET ORAL at 08:50

## 2017-03-25 RX ADMIN — LABETALOL HYDROCHLORIDE 100 MG: 100 TABLET, FILM COATED ORAL at 21:24

## 2017-03-25 RX ADMIN — PANTOPRAZOLE SODIUM 40 MG: 40 INJECTION, POWDER, FOR SOLUTION INTRAVENOUS at 08:50

## 2017-03-25 RX ADMIN — SODIUM CHLORIDE 100 ML/HR: 900 INJECTION, SOLUTION INTRAVENOUS at 01:25

## 2017-03-25 RX ADMIN — RANOLAZINE 500 MG: 500 TABLET, FILM COATED, EXTENDED RELEASE ORAL at 17:43

## 2017-03-25 RX ADMIN — AMLODIPINE BESYLATE 10 MG: 10 TABLET ORAL at 08:50

## 2017-03-26 PROBLEM — K25.4 GASTROINTESTINAL HEMORRHAGE ASSOCIATED WITH GASTRIC ULCER: Status: ACTIVE | Noted: 2017-03-26

## 2017-03-26 LAB
ABO + RH BLD: NORMAL
ABO + RH BLD: NORMAL
BH BB BLOOD EXPIRATION DATE: NORMAL
BH BB BLOOD EXPIRATION DATE: NORMAL
BH BB BLOOD TYPE BARCODE: 600
BH BB BLOOD TYPE BARCODE: 600
BH BB DISPENSE STATUS: NORMAL
BH BB DISPENSE STATUS: NORMAL
BH BB PRODUCT CODE: NORMAL
BH BB PRODUCT CODE: NORMAL
BH BB UNIT NUMBER: NORMAL
BH BB UNIT NUMBER: NORMAL
DEPRECATED RDW RBC AUTO: 49.8 FL (ref 36.4–46.3)
ERYTHROCYTE [DISTWIDTH] IN BLOOD BY AUTOMATED COUNT: 20.5 % (ref 11.5–14.5)
HCT VFR BLD AUTO: 23.7 % (ref 35–45)
HGB BLD-MCNC: 7.4 G/DL (ref 12–15.5)
HYPOCHROMIA BLD QL: NORMAL
LYMPHOCYTES # BLD MANUAL: 1.39 10*3/MM3 (ref 0.6–4.2)
LYMPHOCYTES NFR BLD MANUAL: 10 % (ref 0–12)
LYMPHOCYTES NFR BLD MANUAL: 25 % (ref 10–50)
MACROCYTES BLD QL SMEAR: NORMAL
MCH RBC QN AUTO: 21 PG (ref 26.5–34)
MCHC RBC AUTO-ENTMCNC: 31.2 G/DL (ref 31.4–36)
MCV RBC AUTO: 67.1 FL (ref 80–98)
MONOCYTES # BLD AUTO: 0.55 10*3/MM3 (ref 0–0.9)
NEUTROPHILS # BLD AUTO: 3.55 10*3/MM3 (ref 2–8.6)
NEUTROPHILS NFR BLD MANUAL: 64 % (ref 37–80)
PLAT MORPH BLD: NORMAL
PLATELET # BLD AUTO: 349 10*3/MM3 (ref 150–450)
PMV BLD AUTO: 9.9 FL (ref 8–12)
RBC # BLD AUTO: 3.53 10*6/MM3 (ref 3.77–5.16)
UNIT  ABO: NORMAL
UNIT  ABO: NORMAL
UNIT  RH: NORMAL
UNIT  RH: NORMAL
VARIANT LYMPHS NFR BLD MANUAL: 1 % (ref 0–5)
WBC MORPH BLD: NORMAL
WBC NRBC COR # BLD: 5.54 10*3/MM3 (ref 3.2–9.8)

## 2017-03-26 PROCEDURE — 99232 SBSQ HOSP IP/OBS MODERATE 35: CPT | Performed by: FAMILY MEDICINE

## 2017-03-26 PROCEDURE — 85007 BL SMEAR W/DIFF WBC COUNT: CPT | Performed by: FAMILY MEDICINE

## 2017-03-26 PROCEDURE — 85025 COMPLETE CBC W/AUTO DIFF WBC: CPT | Performed by: FAMILY MEDICINE

## 2017-03-26 RX ADMIN — SODIUM CHLORIDE 100 ML/HR: 900 INJECTION, SOLUTION INTRAVENOUS at 05:34

## 2017-03-26 RX ADMIN — LABETALOL HYDROCHLORIDE 100 MG: 100 TABLET, FILM COATED ORAL at 08:32

## 2017-03-26 RX ADMIN — FERROUS SULFATE TAB EC 324 MG (65 MG FE EQUIVALENT) 324 MG: 324 (65 FE) TABLET DELAYED RESPONSE at 08:32

## 2017-03-26 RX ADMIN — AMLODIPINE BESYLATE 10 MG: 10 TABLET ORAL at 08:32

## 2017-03-26 RX ADMIN — SUCRALFATE 1 G: 1 TABLET ORAL at 17:13

## 2017-03-26 RX ADMIN — HYDROCHLOROTHIAZIDE 12.5 MG: 12.5 CAPSULE ORAL at 08:32

## 2017-03-26 RX ADMIN — PANTOPRAZOLE SODIUM 40 MG: 40 INJECTION, POWDER, FOR SOLUTION INTRAVENOUS at 21:45

## 2017-03-26 RX ADMIN — LABETALOL HYDROCHLORIDE 100 MG: 100 TABLET, FILM COATED ORAL at 21:45

## 2017-03-26 RX ADMIN — PANTOPRAZOLE SODIUM 40 MG: 40 INJECTION, POWDER, FOR SOLUTION INTRAVENOUS at 08:33

## 2017-03-26 RX ADMIN — SUCRALFATE 1 G: 1 TABLET ORAL at 08:33

## 2017-03-26 RX ADMIN — PRAVASTATIN SODIUM 40 MG: 40 TABLET ORAL at 08:33

## 2017-03-26 RX ADMIN — RANOLAZINE 500 MG: 500 TABLET, FILM COATED, EXTENDED RELEASE ORAL at 17:13

## 2017-03-26 RX ADMIN — RANOLAZINE 500 MG: 500 TABLET, FILM COATED, EXTENDED RELEASE ORAL at 08:33

## 2017-03-26 RX ADMIN — ASPIRIN 81 MG 81 MG: 81 TABLET ORAL at 08:32

## 2017-03-26 RX ADMIN — SODIUM CHLORIDE 100 ML/HR: 900 INJECTION, SOLUTION INTRAVENOUS at 17:13

## 2017-03-27 ENCOUNTER — ANESTHESIA EVENT (OUTPATIENT)
Dept: GASTROENTEROLOGY | Facility: HOSPITAL | Age: 67
End: 2017-03-27

## 2017-03-27 ENCOUNTER — ANESTHESIA (OUTPATIENT)
Dept: GASTROENTEROLOGY | Facility: HOSPITAL | Age: 67
End: 2017-03-27

## 2017-03-27 LAB
ALBUMIN SERPL-MCNC: 3.4 G/DL (ref 3.4–4.8)
ALBUMIN/GLOB SERPL: 1.4 G/DL (ref 1.1–1.8)
ALP SERPL-CCNC: 54 U/L (ref 38–126)
ALT SERPL W P-5'-P-CCNC: 19 U/L (ref 9–52)
ANION GAP SERPL CALCULATED.3IONS-SCNC: 9 MMOL/L (ref 5–15)
AST SERPL-CCNC: 26 U/L (ref 14–36)
BASOPHILS # BLD AUTO: 0.02 10*3/MM3 (ref 0–0.2)
BASOPHILS NFR BLD AUTO: 0.3 % (ref 0–2)
BILIRUB SERPL-MCNC: 0.3 MG/DL (ref 0.2–1.3)
BUN BLD-MCNC: 12 MG/DL (ref 7–21)
BUN/CREAT SERPL: 13.6 (ref 7–25)
CALCIUM SPEC-SCNC: 8.4 MG/DL (ref 8.4–10.2)
CHLORIDE SERPL-SCNC: 110 MMOL/L (ref 95–110)
CO2 SERPL-SCNC: 23 MMOL/L (ref 22–31)
CREAT BLD-MCNC: 0.88 MG/DL (ref 0.5–1)
DEPRECATED RDW RBC AUTO: 51.8 FL (ref 36.4–46.3)
EOSINOPHIL # BLD AUTO: 0.18 10*3/MM3 (ref 0–0.7)
EOSINOPHIL NFR BLD AUTO: 2.8 % (ref 0–7)
ERYTHROCYTE [DISTWIDTH] IN BLOOD BY AUTOMATED COUNT: 21.1 % (ref 11.5–14.5)
GFR SERPL CREATININE-BSD FRML MDRD: 64 ML/MIN/1.73 (ref 45–104)
GLOBULIN UR ELPH-MCNC: 2.4 GM/DL (ref 2.3–3.5)
GLUCOSE BLD-MCNC: 100 MG/DL (ref 60–100)
HCT VFR BLD AUTO: 24.2 % (ref 35–45)
HGB BLD-MCNC: 7.4 G/DL (ref 12–15.5)
IMM GRANULOCYTES # BLD: 0.02 10*3/MM3 (ref 0–0.02)
IMM GRANULOCYTES NFR BLD: 0.3 % (ref 0–0.5)
LYMPHOCYTES # BLD AUTO: 1.66 10*3/MM3 (ref 0.6–4.2)
LYMPHOCYTES NFR BLD AUTO: 25.8 % (ref 10–50)
MCH RBC QN AUTO: 20.8 PG (ref 26.5–34)
MCHC RBC AUTO-ENTMCNC: 30.6 G/DL (ref 31.4–36)
MCV RBC AUTO: 68.2 FL (ref 80–98)
MONOCYTES # BLD AUTO: 0.75 10*3/MM3 (ref 0–0.9)
MONOCYTES NFR BLD AUTO: 11.6 % (ref 0–12)
NEUTROPHILS # BLD AUTO: 3.81 10*3/MM3 (ref 2–8.6)
NEUTROPHILS NFR BLD AUTO: 59.2 % (ref 37–80)
PLATELET # BLD AUTO: 348 10*3/MM3 (ref 150–450)
PMV BLD AUTO: 9.7 FL (ref 8–12)
POTASSIUM BLD-SCNC: 3.7 MMOL/L (ref 3.5–5.1)
PROT SERPL-MCNC: 5.8 G/DL (ref 6.3–8.6)
RBC # BLD AUTO: 3.55 10*6/MM3 (ref 3.77–5.16)
SODIUM BLD-SCNC: 142 MMOL/L (ref 137–145)
WBC NRBC COR # BLD: 6.44 10*3/MM3 (ref 3.2–9.8)
WHOLE BLOOD HOLD SPECIMEN: NORMAL

## 2017-03-27 PROCEDURE — 0DB68ZX EXCISION OF STOMACH, VIA NATURAL OR ARTIFICIAL OPENING ENDOSCOPIC, DIAGNOSTIC: ICD-10-PCS | Performed by: INTERNAL MEDICINE

## 2017-03-27 PROCEDURE — 94799 UNLISTED PULMONARY SVC/PX: CPT

## 2017-03-27 PROCEDURE — 25010000002 FENTANYL CITRATE (PF) 100 MCG/2ML SOLUTION: Performed by: NURSE ANESTHETIST, CERTIFIED REGISTERED

## 2017-03-27 PROCEDURE — 85025 COMPLETE CBC W/AUTO DIFF WBC: CPT | Performed by: FAMILY MEDICINE

## 2017-03-27 PROCEDURE — 25010000002 PROPOFOL 10 MG/ML EMULSION: Performed by: NURSE ANESTHETIST, CERTIFIED REGISTERED

## 2017-03-27 PROCEDURE — 99232 SBSQ HOSP IP/OBS MODERATE 35: CPT | Performed by: FAMILY MEDICINE

## 2017-03-27 PROCEDURE — 80053 COMPREHEN METABOLIC PANEL: CPT | Performed by: FAMILY MEDICINE

## 2017-03-27 PROCEDURE — 25010000002 MIDAZOLAM PER 1 MG: Performed by: NURSE ANESTHETIST, CERTIFIED REGISTERED

## 2017-03-27 PROCEDURE — 94760 N-INVAS EAR/PLS OXIMETRY 1: CPT

## 2017-03-27 RX ORDER — FENTANYL CITRATE 50 UG/ML
INJECTION, SOLUTION INTRAMUSCULAR; INTRAVENOUS AS NEEDED
Status: DISCONTINUED | OUTPATIENT
Start: 2017-03-27 | End: 2017-03-27 | Stop reason: SURG

## 2017-03-27 RX ORDER — PROPOFOL 10 MG/ML
VIAL (ML) INTRAVENOUS AS NEEDED
Status: DISCONTINUED | OUTPATIENT
Start: 2017-03-27 | End: 2017-03-27 | Stop reason: SURG

## 2017-03-27 RX ORDER — MIDAZOLAM HYDROCHLORIDE 1 MG/ML
INJECTION INTRAMUSCULAR; INTRAVENOUS AS NEEDED
Status: DISCONTINUED | OUTPATIENT
Start: 2017-03-27 | End: 2017-03-27 | Stop reason: SURG

## 2017-03-27 RX ADMIN — MIDAZOLAM 0.5 MG: 1 INJECTION INTRAMUSCULAR; INTRAVENOUS at 17:01

## 2017-03-27 RX ADMIN — RANOLAZINE 500 MG: 500 TABLET, FILM COATED, EXTENDED RELEASE ORAL at 11:06

## 2017-03-27 RX ADMIN — SODIUM CHLORIDE 100 ML/HR: 900 INJECTION, SOLUTION INTRAVENOUS at 23:41

## 2017-03-27 RX ADMIN — PRAVASTATIN SODIUM 40 MG: 40 TABLET ORAL at 18:15

## 2017-03-27 RX ADMIN — SUCRALFATE 1 G: 1 TABLET ORAL at 18:15

## 2017-03-27 RX ADMIN — HYDROCHLOROTHIAZIDE 12.5 MG: 12.5 CAPSULE ORAL at 18:15

## 2017-03-27 RX ADMIN — PROPOFOL 40 MG: 10 INJECTION, EMULSION INTRAVENOUS at 17:01

## 2017-03-27 RX ADMIN — ASPIRIN 81 MG 81 MG: 81 TABLET ORAL at 18:14

## 2017-03-27 RX ADMIN — FENTANYL CITRATE 25 MCG: 50 INJECTION, SOLUTION INTRAMUSCULAR; INTRAVENOUS at 17:01

## 2017-03-27 RX ADMIN — FERROUS SULFATE TAB EC 324 MG (65 MG FE EQUIVALENT) 324 MG: 324 (65 FE) TABLET DELAYED RESPONSE at 18:14

## 2017-03-27 RX ADMIN — LABETALOL HYDROCHLORIDE 100 MG: 100 TABLET, FILM COATED ORAL at 20:43

## 2017-03-27 RX ADMIN — AMLODIPINE BESYLATE 10 MG: 10 TABLET ORAL at 18:15

## 2017-03-27 RX ADMIN — SODIUM CHLORIDE 100 ML/HR: 900 INJECTION, SOLUTION INTRAVENOUS at 13:11

## 2017-03-27 RX ADMIN — RANOLAZINE 500 MG: 500 TABLET, FILM COATED, EXTENDED RELEASE ORAL at 18:15

## 2017-03-27 RX ADMIN — PANTOPRAZOLE SODIUM 40 MG: 40 INJECTION, POWDER, FOR SOLUTION INTRAVENOUS at 09:32

## 2017-03-27 RX ADMIN — LABETALOL HYDROCHLORIDE 100 MG: 100 TABLET, FILM COATED ORAL at 11:05

## 2017-03-27 NOTE — ANESTHESIA PREPROCEDURE EVALUATION
Anesthesia Evaluation     Patient summary reviewed and Nursing notes reviewed   NPO Status: > 4 hours   Airway   Mallampati: II  TM distance: >3 FB  Neck ROM: full  no difficulty expected  Dental    (+) lower dentures and upper dentures    Pulmonary - normal exam   Cardiovascular     Rhythm: regular  Rate: normal        Neuro/Psych- negative ROS  GI/Hepatic/Renal/Endo    (+)  GERD well controlled,     ROS Comment: Possible GI beed    Musculoskeletal (-) negative ROS    Abdominal    Substance History      OB/GYN          Other                                    Anesthesia Plan    ASA 3     MAC     Anesthetic plan and risks discussed with patient.    Plan discussed with CRNA.

## 2017-03-27 NOTE — ANESTHESIA POSTPROCEDURE EVALUATION
Patient: Fouzia Maldonado    Procedure Summary     Date Anesthesia Start Anesthesia Stop Room / Location    03/27/17 1701 1707 [facility] ENDOSCOPY 2 / [facility] ENDOSCOPY       Procedure Diagnosis Surgeon Provider    ESOPHAGOGASTRODUODENOSCOPY WITH CONTROL OF BLEED (N/A Esophagus) Acute blood loss anemia  (Acute blood loss anemia [D62]) Eladio Esquivel, DO Candido Moreno CRNA          Anesthesia Type: MAC  Last vitals  /60 (03/27/17 1611)    Temp 98.1 °F (36.7 °C) (03/27/17 1611)    Pulse 68 (03/27/17 1611)   Resp 18 (03/27/17 1611)    SpO2 99 % (03/27/17 1611)      Post Anesthesia Care and Evaluation    Patient location during evaluation: bedside  Patient participation: complete - patient participated  Level of consciousness: awake and alert  Pain score: 0  Pain management: adequate  Airway patency: patent  Anesthetic complications: No anesthetic complications  PONV Status: none  Cardiovascular status: acceptable  Respiratory status: acceptable  Hydration status: acceptable

## 2017-03-28 LAB
ALBUMIN SERPL-MCNC: 3.7 G/DL (ref 3.4–4.8)
ALBUMIN/GLOB SERPL: 1.7 G/DL (ref 1.1–1.8)
ALP SERPL-CCNC: 64 U/L (ref 38–126)
ALT SERPL W P-5'-P-CCNC: 21 U/L (ref 9–52)
ANION GAP SERPL CALCULATED.3IONS-SCNC: 12 MMOL/L (ref 5–15)
ANISOCYTOSIS BLD QL: NORMAL
AST SERPL-CCNC: 20 U/L (ref 14–36)
BASOPHILS # BLD AUTO: 0.02 10*3/MM3 (ref 0–0.2)
BASOPHILS NFR BLD AUTO: 0.3 % (ref 0–2)
BILIRUB SERPL-MCNC: 0.3 MG/DL (ref 0.2–1.3)
BUN BLD-MCNC: 12 MG/DL (ref 7–21)
BUN/CREAT SERPL: 13 (ref 7–25)
CALCIUM SPEC-SCNC: 8.7 MG/DL (ref 8.4–10.2)
CHLORIDE SERPL-SCNC: 107 MMOL/L (ref 95–110)
CO2 SERPL-SCNC: 24 MMOL/L (ref 22–31)
CREAT BLD-MCNC: 0.92 MG/DL (ref 0.5–1)
DEPRECATED RDW RBC AUTO: 54.4 FL (ref 36.4–46.3)
EOSINOPHIL # BLD AUTO: 0.2 10*3/MM3 (ref 0–0.7)
EOSINOPHIL NFR BLD AUTO: 3.3 % (ref 0–7)
ERYTHROCYTE [DISTWIDTH] IN BLOOD BY AUTOMATED COUNT: 22 % (ref 11.5–14.5)
GFR SERPL CREATININE-BSD FRML MDRD: 61 ML/MIN/1.73 (ref 45–104)
GLOBULIN UR ELPH-MCNC: 2.2 GM/DL (ref 2.3–3.5)
GLUCOSE BLD-MCNC: 97 MG/DL (ref 60–100)
HCT VFR BLD AUTO: 25.2 % (ref 35–45)
HGB BLD-MCNC: 7.7 G/DL (ref 12–15.5)
HYPOCHROMIA BLD QL: NORMAL
IMM GRANULOCYTES # BLD: 0.01 10*3/MM3 (ref 0–0.02)
IMM GRANULOCYTES NFR BLD: 0.2 % (ref 0–0.5)
LYMPHOCYTES # BLD AUTO: 1.33 10*3/MM3 (ref 0.6–4.2)
LYMPHOCYTES NFR BLD AUTO: 21.9 % (ref 10–50)
MAGNESIUM SERPL-MCNC: 2 MG/DL (ref 1.6–2.3)
MCH RBC QN AUTO: 20.9 PG (ref 26.5–34)
MCHC RBC AUTO-ENTMCNC: 30.6 G/DL (ref 31.4–36)
MCV RBC AUTO: 68.5 FL (ref 80–98)
MICROCYTES BLD QL: NORMAL
MONOCYTES # BLD AUTO: 0.51 10*3/MM3 (ref 0–0.9)
MONOCYTES NFR BLD AUTO: 8.4 % (ref 0–12)
NEUTROPHILS # BLD AUTO: 3.99 10*3/MM3 (ref 2–8.6)
NEUTROPHILS NFR BLD AUTO: 65.9 % (ref 37–80)
PLATELET # BLD AUTO: 360 10*3/MM3 (ref 150–450)
PMV BLD AUTO: 9.7 FL (ref 8–12)
POLYCHROMASIA BLD QL SMEAR: NORMAL
POTASSIUM BLD-SCNC: 3.4 MMOL/L (ref 3.5–5.1)
PROT SERPL-MCNC: 5.9 G/DL (ref 6.3–8.6)
RBC # BLD AUTO: 3.68 10*6/MM3 (ref 3.77–5.16)
SMALL PLATELETS BLD QL SMEAR: ADEQUATE
SODIUM BLD-SCNC: 143 MMOL/L (ref 137–145)
WBC MORPH BLD: NORMAL
WBC NRBC COR # BLD: 6.06 10*3/MM3 (ref 3.2–9.8)

## 2017-03-28 PROCEDURE — 85025 COMPLETE CBC W/AUTO DIFF WBC: CPT | Performed by: FAMILY MEDICINE

## 2017-03-28 PROCEDURE — 83735 ASSAY OF MAGNESIUM: CPT | Performed by: FAMILY MEDICINE

## 2017-03-28 PROCEDURE — 80053 COMPREHEN METABOLIC PANEL: CPT | Performed by: FAMILY MEDICINE

## 2017-03-28 PROCEDURE — 85007 BL SMEAR W/DIFF WBC COUNT: CPT | Performed by: FAMILY MEDICINE

## 2017-03-28 PROCEDURE — 99232 SBSQ HOSP IP/OBS MODERATE 35: CPT | Performed by: FAMILY MEDICINE

## 2017-03-28 RX ORDER — POTASSIUM CHLORIDE 1.5 G/1.77G
40 POWDER, FOR SOLUTION ORAL ONCE
Status: COMPLETED | OUTPATIENT
Start: 2017-03-28 | End: 2017-03-28

## 2017-03-28 RX ORDER — POTASSIUM CHLORIDE 1.5 G/1.77G
20 POWDER, FOR SOLUTION ORAL DAILY
Status: DISCONTINUED | OUTPATIENT
Start: 2017-03-28 | End: 2017-03-28 | Stop reason: HOSPADM

## 2017-03-28 RX ORDER — POTASSIUM CHLORIDE 1.5 G/1.77G
20 POWDER, FOR SOLUTION ORAL DAILY
Qty: 2 PACKET | Refills: 0 | Status: SHIPPED | OUTPATIENT
Start: 2017-03-28 | End: 2017-03-30

## 2017-03-28 RX ORDER — FERROUS SULFATE TAB EC 324 MG (65 MG FE EQUIVALENT) 324 (65 FE) MG
324 TABLET DELAYED RESPONSE ORAL
Qty: 10 TABLET | Refills: 0 | Status: SHIPPED | OUTPATIENT
Start: 2017-03-28 | End: 2018-09-14

## 2017-03-28 RX ORDER — SUCRALFATE 1 G/1
1 TABLET ORAL 2 TIMES DAILY
Qty: 30 TABLET | Refills: 0 | Status: SHIPPED | OUTPATIENT
Start: 2017-03-28 | End: 2017-04-12

## 2017-03-28 RX ADMIN — HYDROCHLOROTHIAZIDE 12.5 MG: 12.5 CAPSULE ORAL at 08:53

## 2017-03-28 RX ADMIN — LABETALOL HYDROCHLORIDE 100 MG: 100 TABLET, FILM COATED ORAL at 08:53

## 2017-03-28 RX ADMIN — RANOLAZINE 500 MG: 500 TABLET, FILM COATED, EXTENDED RELEASE ORAL at 08:53

## 2017-03-28 RX ADMIN — ASPIRIN 81 MG 81 MG: 81 TABLET ORAL at 08:53

## 2017-03-28 RX ADMIN — POTASSIUM CHLORIDE 40 MEQ: 1.5 POWDER, FOR SOLUTION ORAL at 10:11

## 2017-03-28 RX ADMIN — FERROUS SULFATE TAB EC 324 MG (65 MG FE EQUIVALENT) 324 MG: 324 (65 FE) TABLET DELAYED RESPONSE at 08:53

## 2017-03-28 RX ADMIN — SUCRALFATE 1 G: 1 TABLET ORAL at 08:52

## 2017-03-28 RX ADMIN — AMLODIPINE BESYLATE 10 MG: 10 TABLET ORAL at 08:53

## 2017-03-28 RX ADMIN — ONDANSETRON 4 MG: 4 TABLET, ORALLY DISINTEGRATING ORAL at 10:11

## 2017-03-28 RX ADMIN — SODIUM CHLORIDE 100 ML/HR: 900 INJECTION, SOLUTION INTRAVENOUS at 08:57

## 2017-03-28 RX ADMIN — PRAVASTATIN SODIUM 40 MG: 40 TABLET ORAL at 08:53

## 2017-03-30 LAB
BACTERIA SPEC AEROBE CULT: NORMAL
BACTERIA SPEC AEROBE CULT: NORMAL

## 2017-04-18 RX ORDER — SODIUM CHLORIDE 9 MG/ML
250 INJECTION, SOLUTION INTRAVENOUS ONCE
Status: CANCELLED | OUTPATIENT
Start: 2017-04-18

## 2017-04-21 ENCOUNTER — INFUSION (OUTPATIENT)
Dept: ONCOLOGY | Facility: HOSPITAL | Age: 67
End: 2017-04-21

## 2017-04-21 VITALS — DIASTOLIC BLOOD PRESSURE: 66 MMHG | HEART RATE: 80 BPM | TEMPERATURE: 98 F | SYSTOLIC BLOOD PRESSURE: 139 MMHG

## 2017-04-21 DIAGNOSIS — D62 ACUTE BLOOD LOSS ANEMIA: Primary | ICD-10-CM

## 2017-04-21 PROCEDURE — 25010000002 FERUMOXYTOL 510 MG/17ML SOLUTION 510 MG VIAL: Performed by: HOSPITALIST

## 2017-04-21 PROCEDURE — 96365 THER/PROPH/DIAG IV INF INIT: CPT | Performed by: HOSPITALIST

## 2017-04-21 RX ORDER — SODIUM CHLORIDE 9 MG/ML
250 INJECTION, SOLUTION INTRAVENOUS ONCE
Status: CANCELLED | OUTPATIENT
Start: 2017-04-25

## 2017-04-21 RX ORDER — SODIUM CHLORIDE 9 MG/ML
250 INJECTION, SOLUTION INTRAVENOUS ONCE
Status: COMPLETED | OUTPATIENT
Start: 2017-04-21 | End: 2017-04-21

## 2017-04-21 RX ADMIN — SODIUM CHLORIDE 250 ML: 9 INJECTION, SOLUTION INTRAVENOUS at 13:32

## 2017-04-21 RX ADMIN — FERUMOXYTOL 510 MG: 510 INJECTION INTRAVENOUS at 14:02

## 2017-04-28 ENCOUNTER — INFUSION (OUTPATIENT)
Dept: ONCOLOGY | Facility: HOSPITAL | Age: 67
End: 2017-04-28

## 2017-04-28 VITALS
BODY MASS INDEX: 26.65 KG/M2 | SYSTOLIC BLOOD PRESSURE: 138 MMHG | RESPIRATION RATE: 18 BRPM | OXYGEN SATURATION: 98 % | WEIGHT: 156.09 LBS | HEART RATE: 69 BPM | DIASTOLIC BLOOD PRESSURE: 63 MMHG | TEMPERATURE: 97.4 F | HEIGHT: 64 IN

## 2017-04-28 VITALS
TEMPERATURE: 98.2 F | SYSTOLIC BLOOD PRESSURE: 101 MMHG | DIASTOLIC BLOOD PRESSURE: 59 MMHG | RESPIRATION RATE: 18 BRPM | HEART RATE: 84 BPM

## 2017-04-28 DIAGNOSIS — D62 ACUTE BLOOD LOSS ANEMIA: Primary | ICD-10-CM

## 2017-04-28 PROCEDURE — 96365 THER/PROPH/DIAG IV INF INIT: CPT | Performed by: HOSPITALIST

## 2017-04-28 PROCEDURE — 25010000002 FERUMOXYTOL 510 MG/17ML SOLUTION 510 MG VIAL: Performed by: HOSPITALIST

## 2017-04-28 RX ORDER — SODIUM CHLORIDE 9 MG/ML
250 INJECTION, SOLUTION INTRAVENOUS ONCE
Status: COMPLETED | OUTPATIENT
Start: 2017-04-28 | End: 2017-04-28

## 2017-04-28 RX ORDER — SODIUM CHLORIDE 9 MG/ML
250 INJECTION, SOLUTION INTRAVENOUS ONCE
Status: CANCELLED | OUTPATIENT
Start: 2017-05-05

## 2017-04-28 RX ADMIN — SODIUM CHLORIDE 250 ML: 9 INJECTION, SOLUTION INTRAVENOUS at 13:25

## 2017-04-28 RX ADMIN — FERUMOXYTOL 510 MG: 510 INJECTION INTRAVENOUS at 13:37

## 2017-05-08 ENCOUNTER — APPOINTMENT (OUTPATIENT)
Dept: GASTROENTEROLOGY | Facility: HOSPITAL | Age: 67
End: 2017-05-08

## 2017-05-12 ENCOUNTER — HOSPITAL ENCOUNTER (OUTPATIENT)
Dept: GASTROENTEROLOGY | Facility: HOSPITAL | Age: 67
Discharge: HOME | End: 2017-05-12

## 2017-07-13 RX ORDER — SODIUM CHLORIDE 9 MG/ML
250 INJECTION, SOLUTION INTRAVENOUS ONCE
Status: CANCELLED | OUTPATIENT
Start: 2017-07-14

## 2017-07-21 ENCOUNTER — INFUSION (OUTPATIENT)
Dept: ONCOLOGY | Facility: HOSPITAL | Age: 67
End: 2017-07-21

## 2017-07-21 VITALS
DIASTOLIC BLOOD PRESSURE: 63 MMHG | TEMPERATURE: 97.7 F | RESPIRATION RATE: 18 BRPM | HEART RATE: 81 BPM | SYSTOLIC BLOOD PRESSURE: 143 MMHG

## 2017-07-21 DIAGNOSIS — K25.4 GASTROINTESTINAL HEMORRHAGE ASSOCIATED WITH GASTRIC ULCER: Primary | ICD-10-CM

## 2017-07-21 PROCEDURE — 96374 THER/PROPH/DIAG INJ IV PUSH: CPT | Performed by: INTERNAL MEDICINE

## 2017-07-21 PROCEDURE — 25010000002 FERUMOXYTOL 510 MG/17ML SOLUTION 510 MG VIAL: Performed by: INTERNAL MEDICINE

## 2017-07-21 RX ORDER — SODIUM CHLORIDE 9 MG/ML
250 INJECTION, SOLUTION INTRAVENOUS ONCE
Status: CANCELLED | OUTPATIENT
Start: 2017-07-21

## 2017-07-21 RX ORDER — SODIUM CHLORIDE 9 MG/ML
250 INJECTION, SOLUTION INTRAVENOUS ONCE
Status: COMPLETED | OUTPATIENT
Start: 2017-07-21 | End: 2017-07-21

## 2017-07-21 RX ADMIN — SODIUM CHLORIDE 250 ML: 9 INJECTION, SOLUTION INTRAVENOUS at 13:55

## 2017-07-21 RX ADMIN — FERUMOXYTOL 510 MG: 510 INJECTION INTRAVENOUS at 13:57

## 2017-07-28 ENCOUNTER — INFUSION (OUTPATIENT)
Dept: ONCOLOGY | Facility: HOSPITAL | Age: 67
End: 2017-07-28

## 2017-07-28 ENCOUNTER — OFFICE VISIT (OUTPATIENT)
Dept: PAIN MEDICINE | Facility: CLINIC | Age: 67
End: 2017-07-28

## 2017-07-28 VITALS
HEIGHT: 65 IN | DIASTOLIC BLOOD PRESSURE: 82 MMHG | SYSTOLIC BLOOD PRESSURE: 152 MMHG | BODY MASS INDEX: 26.26 KG/M2 | WEIGHT: 157.6 LBS

## 2017-07-28 VITALS — DIASTOLIC BLOOD PRESSURE: 67 MMHG | SYSTOLIC BLOOD PRESSURE: 141 MMHG | TEMPERATURE: 97.4 F | HEART RATE: 76 BPM

## 2017-07-28 DIAGNOSIS — M54.50 CHRONIC BILATERAL LOW BACK PAIN WITHOUT SCIATICA: Primary | ICD-10-CM

## 2017-07-28 DIAGNOSIS — Z79.899 HIGH RISK MEDICATIONS (NOT ANTICOAGULANTS) LONG-TERM USE: ICD-10-CM

## 2017-07-28 DIAGNOSIS — M25.9 MULTIPLE JOINT COMPLAINTS: ICD-10-CM

## 2017-07-28 DIAGNOSIS — K25.4 GASTROINTESTINAL HEMORRHAGE ASSOCIATED WITH GASTRIC ULCER: Primary | ICD-10-CM

## 2017-07-28 DIAGNOSIS — G89.29 CHRONIC BILATERAL LOW BACK PAIN WITHOUT SCIATICA: Primary | ICD-10-CM

## 2017-07-28 PROCEDURE — 25010000002 FERUMOXYTOL 510 MG/17ML SOLUTION 510 MG VIAL: Performed by: INTERNAL MEDICINE

## 2017-07-28 PROCEDURE — 99214 OFFICE O/P EST MOD 30 MIN: CPT | Performed by: NURSE PRACTITIONER

## 2017-07-28 PROCEDURE — 96374 THER/PROPH/DIAG INJ IV PUSH: CPT | Performed by: INTERNAL MEDICINE

## 2017-07-28 RX ORDER — SODIUM CHLORIDE 9 MG/ML
250 INJECTION, SOLUTION INTRAVENOUS ONCE
Status: COMPLETED | OUTPATIENT
Start: 2017-07-28 | End: 2017-07-28

## 2017-07-28 RX ORDER — HYDROMORPHONE HYDROCHLORIDE 2 MG/1
2 TABLET ORAL DAILY
Qty: 30 TABLET | Refills: 0 | Status: SHIPPED | OUTPATIENT
Start: 2017-07-28 | End: 2018-07-17 | Stop reason: ALTCHOICE

## 2017-07-28 RX ORDER — SODIUM CHLORIDE 9 MG/ML
250 INJECTION, SOLUTION INTRAVENOUS ONCE
Status: CANCELLED | OUTPATIENT
Start: 2017-07-28

## 2017-07-28 RX ADMIN — SODIUM CHLORIDE 250 ML: 9 INJECTION, SOLUTION INTRAVENOUS at 10:01

## 2017-07-28 RX ADMIN — FERUMOXYTOL 510 MG: 510 INJECTION INTRAVENOUS at 10:01

## 2017-07-28 NOTE — PROGRESS NOTES
"Fouzia Maldonado is a 67 y.o. female.   1950    HPI:   Location: lower back and Joints  Quality: throbbing  Severity: 8/10  Timing: constant  Alleviating: heating pad and hot bath  Aggravating: standing  and ambulating very far     Patient has had low back pain for about 10 years.  She denies any specific injury that started this.  She relates that it is likely due to her long trigger is a  which required a lot of standing.  She has some buttock and hip pain loss she stands and walks for very long time.  She very rarely has leg pains related to this they go below the knee.  She complains of multiple joint pains as she relates as \"osteoarthritis\".  She has never been to pain management.  She has not had physical therapy for her lumbar spine.  She takes hydromorphone 2 mg daily usually in the evening, as well as gabapentin 800 of which she typically takes 1-1/2 pills per day.  She was previously on Celebrex and is now on nabumetone.  She is currently undergoing a workup for anemia and I have suggested that she probably should stop the nabumetone for now.  She is lidocaine ointment to some effect.  She does have a history of ulcers.  She does report a remote history of suicide attempt around the time her   approximately 20 years ago.  She states that her face help get her through this and she has no concerns about her risk for self-harm any longer.  He does help improve her low back symptoms.  She has not had imaging of the lumbar spine for several years she states.      The following portions of the patient's history were reviewed by me and updated as appropriate: allergies, current medications, past family history, past medical history, past social history, past surgical history and problem list.    Past Medical History:   Diagnosis Date   • Anemia    • Arthritis    • Coronary atherosclerosis of native coronary artery    • Depression    • Hearing loss of both ears     can hear nothing " out of left ear and has right ear loss as well   • Hyperlipidemia    • Hypertension    • Meniere disease    • Nonrheumatic mitral valve disorder    • Pancreatitis    • Precordial pain    • Preprocedural cardiovascular examination    • Stomach ulcer    • Suicide attempt by drug ingestion 1998   • Transfusion history        Social History     Social History   • Marital status:      Spouse name: N/A   • Number of children: N/A   • Years of education: N/A     Occupational History   • Not on file.     Social History Main Topics   • Smoking status: Former Smoker   • Smokeless tobacco: Never Used   • Alcohol use No   • Drug use: No   • Sexual activity: Defer     Other Topics Concern   • Not on file     Social History Narrative    Quit smoking 10 years ago    Lives with her     No drugs, no alcohol        Family History   Problem Relation Age of Onset   • Heart disease Mother    • Cancer Mother      colon   • Early death Mother 68     colon cancer   • Hypertension Mother    • Hyperlipidemia Mother    • Heart disease Father    • Alcohol abuse Father    • Early death Father 56     heart attack   • Hypertension Father    • Hyperlipidemia Father    • Cancer Maternal Grandmother      stomach   • Cancer Maternal Grandfather    • Cancer Paternal Grandfather      stomach         Current Outpatient Prescriptions:   •  amLODIPine (NORVASC) 5 MG tablet, Take 2 tablets by mouth Daily., Disp: , Rfl:   •  aspirin 81 MG chewable tablet, Chew 81 mg Daily., Disp: , Rfl:   •  ferrous sulfate 324 (65 FE) MG tablet delayed-release EC tablet, Take 1 tablet by mouth Daily With Breakfast., Disp: 10 tablet, Rfl: 0  •  gabapentin (NEURONTIN) 300 MG capsule, Take 300 mg by mouth 3 (Three) Times a Day., Disp: , Rfl:   •  gemfibrozil (LOPID) 600 MG tablet, Take 600 mg by mouth Daily., Disp: , Rfl:   •  hydrochlorothiazide (MICROZIDE) 12.5 MG capsule, Take 12.5 mg by mouth Daily., Disp: , Rfl:   •  pantoprazole (PROTONIX) 40 MG EC tablet,  Take 40 mg by mouth 2 (Two) Times a Day., Disp: , Rfl:   •  PARoxetine (PAXIL) 20 MG tablet, Take 20 mg by mouth Every Morning., Disp: , Rfl:   •  pravastatin (PRAVACHOL) 40 MG tablet, Take 40 mg by mouth Daily., Disp: , Rfl:   •  ranolazine (RANEXA) 500 MG 12 hr tablet, Take 1 tablet by mouth 2 (Two) Times a Day. 500mg tablet each morning and (2) 500mg tablets QHS, Disp: 270 tablet, Rfl: 3  •  vitamin D (ERGOCALCIFEROL) 37352 UNITS capsule capsule, Take 50,000 Units by mouth 1 (One) Time Per Week., Disp: , Rfl:   •  HYDROmorphone (DILAUDID) 2 MG tablet, Take 1 tablet by mouth Daily., Disp: 30 tablet, Rfl: 0  •  HYDROmorphone (DILAUDID) 2 MG tablet, Take 1 tablet by mouth Daily., Disp: 30 tablet, Rfl: 0    Allergies   Allergen Reactions   • Amoxicillin Rash   • Darvon [Propoxyphene] Rash       Review of Systems   Musculoskeletal: Positive for back pain (lower).        Multiple joint pain   All other systems reviewed and are negative.    All review of systems reviewed and negative except for above.    Physical Exam   Constitutional: She is oriented to person, place, and time. She appears well-developed and well-nourished. She does not appear ill. No distress.   HENT:   Head: Normocephalic and atraumatic.   Right Ear: Hearing normal.   Left Ear: Hearing normal.   Eyes: Conjunctivae and EOM are normal. Pupils are equal, round, and reactive to light.   Neck: Normal range of motion and full passive range of motion without pain. Neck supple. No muscular tenderness present. Normal range of motion present.   Cardiovascular: Normal rate, regular rhythm and normal heart sounds.    Pulmonary/Chest: Effort normal and breath sounds normal.   Abdominal: Soft. Bowel sounds are normal. There is no tenderness.   Musculoskeletal:        Lumbar back: She exhibits decreased range of motion (45° of flexion.  5° extension with bilateral lumbar facet loading.).   Neurological: She is alert and oriented to person, place, and time. She has  normal strength and normal reflexes. She displays normal reflexes. No cranial nerve deficit or sensory deficit. She exhibits normal muscle tone. Coordination and gait normal.   Negative straight leg raise bilaterally   Skin: Skin is warm and dry. No rash noted. No erythema.   Psychiatric: She has a normal mood and affect. Her behavior is normal. Judgment normal.   Vitals reviewed.      Fouzia was seen today for back pain and joint pain.    Diagnoses and all orders for this visit:    Chronic bilateral low back pain without sciatica  -     XR Spine Lumbar 4+ View; Future    High risk medications (not anticoagulants) long-term use    Multiple joint complaints    Other orders  -     HYDROmorphone (DILAUDID) 2 MG tablet; Take 1 tablet by mouth Daily.  -     HYDROmorphone (DILAUDID) 2 MG tablet; Take 1 tablet by mouth Daily.      Medication: Patient reports no negative side effects, Patient reports appropriate usage and storage habits, Patient's opioid provides enough reflief to be more active and perform activities of daily living with less discomfort., Refill opioid medication as above and Opioid contract was read and discussed today and the patient chooses to sign. I will also provide a compounded cream.  I have instructed her to stop the nabumetone for now.    Interventional: none at this time.  Certainly a candidate for facet interventions.  We will put this off until her GI workup is complete as she has many appointments to keep.  Plain films of lumbar spine today.    Rehab: none at this time    Behavioral: No aberrant behavior noted. [facility] Report #[id number]  was reviewed and is consistent with stated history.  Patient reports having had counseling in the past and did not feel was very helpful.  She states she is in a good place now and reports that her face keeps her grounded and she has a good outlook on life although occasional depressive symptoms do present.  She is treated with Paxil for depression.    Urine  drug screen None at this time    ORT: 2    PHQ-9: 14          This document has been electronically signed by James Rubin MD on July 28, 2017 8:55 AM

## 2017-09-19 RX ORDER — SUCRALFATE 1 G/1
TABLET ORAL
Qty: 360 TABLET | Refills: 3 | OUTPATIENT
Start: 2017-09-19

## 2018-04-06 RX ORDER — MELOXICAM 15 MG/1
TABLET ORAL
Qty: 90 TABLET | Refills: 4 | Status: SHIPPED | OUTPATIENT
Start: 2018-04-06 | End: 2019-04-22 | Stop reason: SDUPTHER

## 2018-07-17 RX ORDER — LANOLIN ALCOHOL/MO/W.PET/CERES
1000 CREAM (GRAM) TOPICAL DAILY
COMMUNITY

## 2018-07-17 RX ORDER — FLUOXETINE HYDROCHLORIDE 20 MG/1
20 CAPSULE ORAL DAILY
COMMUNITY

## 2018-07-17 RX ORDER — SUCRALFATE 1 G/1
1 TABLET ORAL 4 TIMES DAILY
COMMUNITY

## 2018-07-24 ENCOUNTER — ANESTHESIA (OUTPATIENT)
Dept: GASTROENTEROLOGY | Facility: HOSPITAL | Age: 68
End: 2018-07-24

## 2018-07-24 ENCOUNTER — HOSPITAL ENCOUNTER (OUTPATIENT)
Facility: HOSPITAL | Age: 68
Setting detail: HOSPITAL OUTPATIENT SURGERY
Discharge: HOME | End: 2018-07-24
Attending: INTERNAL MEDICINE | Admitting: INTERNAL MEDICINE

## 2018-07-24 ENCOUNTER — ANESTHESIA EVENT (OUTPATIENT)
Dept: GASTROENTEROLOGY | Facility: HOSPITAL | Age: 68
End: 2018-07-24

## 2018-07-24 VITALS
OXYGEN SATURATION: 92 % | BODY MASS INDEX: 27.49 KG/M2 | WEIGHT: 161 LBS | TEMPERATURE: 97.2 F | HEART RATE: 65 BPM | RESPIRATION RATE: 12 BRPM | HEIGHT: 64 IN | DIASTOLIC BLOOD PRESSURE: 60 MMHG | SYSTOLIC BLOOD PRESSURE: 135 MMHG

## 2018-07-24 PROCEDURE — 25010000002 FENTANYL CITRATE (PF) 100 MCG/2ML SOLUTION: Performed by: NURSE ANESTHETIST, CERTIFIED REGISTERED

## 2018-07-24 PROCEDURE — 25010000002 PROPOFOL 10 MG/ML EMULSION: Performed by: NURSE ANESTHETIST, CERTIFIED REGISTERED

## 2018-07-24 PROCEDURE — 25010000002 MIDAZOLAM PER 1 MG: Performed by: NURSE ANESTHETIST, CERTIFIED REGISTERED

## 2018-07-24 RX ORDER — MEPERIDINE HYDROCHLORIDE 50 MG/ML
12.5 INJECTION INTRAMUSCULAR; INTRAVENOUS; SUBCUTANEOUS
Status: DISCONTINUED | OUTPATIENT
Start: 2018-07-24 | End: 2018-07-24 | Stop reason: HOSPADM

## 2018-07-24 RX ORDER — PROPOFOL 10 MG/ML
VIAL (ML) INTRAVENOUS AS NEEDED
Status: DISCONTINUED | OUTPATIENT
Start: 2018-07-24 | End: 2018-07-24 | Stop reason: SURG

## 2018-07-24 RX ORDER — FENTANYL CITRATE 50 UG/ML
INJECTION, SOLUTION INTRAMUSCULAR; INTRAVENOUS AS NEEDED
Status: DISCONTINUED | OUTPATIENT
Start: 2018-07-24 | End: 2018-07-24 | Stop reason: SURG

## 2018-07-24 RX ORDER — DEXTROSE AND SODIUM CHLORIDE 5; .45 G/100ML; G/100ML
30 INJECTION, SOLUTION INTRAVENOUS CONTINUOUS
Status: DISCONTINUED | OUTPATIENT
Start: 2018-07-24 | End: 2018-07-24 | Stop reason: HOSPADM

## 2018-07-24 RX ORDER — ONDANSETRON 2 MG/ML
4 INJECTION INTRAMUSCULAR; INTRAVENOUS ONCE AS NEEDED
Status: DISCONTINUED | OUTPATIENT
Start: 2018-07-24 | End: 2018-07-24 | Stop reason: HOSPADM

## 2018-07-24 RX ORDER — DEXAMETHASONE SODIUM PHOSPHATE 4 MG/ML
8 INJECTION, SOLUTION INTRA-ARTICULAR; INTRALESIONAL; INTRAMUSCULAR; INTRAVENOUS; SOFT TISSUE ONCE AS NEEDED
Status: DISCONTINUED | OUTPATIENT
Start: 2018-07-24 | End: 2018-07-24 | Stop reason: HOSPADM

## 2018-07-24 RX ORDER — LIDOCAINE HYDROCHLORIDE 10 MG/ML
INJECTION, SOLUTION INFILTRATION; PERINEURAL AS NEEDED
Status: DISCONTINUED | OUTPATIENT
Start: 2018-07-24 | End: 2018-07-24 | Stop reason: SURG

## 2018-07-24 RX ORDER — PROMETHAZINE HYDROCHLORIDE 25 MG/ML
12.5 INJECTION, SOLUTION INTRAMUSCULAR; INTRAVENOUS ONCE AS NEEDED
Status: DISCONTINUED | OUTPATIENT
Start: 2018-07-24 | End: 2018-07-24 | Stop reason: HOSPADM

## 2018-07-24 RX ORDER — MIDAZOLAM HYDROCHLORIDE 1 MG/ML
INJECTION INTRAMUSCULAR; INTRAVENOUS AS NEEDED
Status: DISCONTINUED | OUTPATIENT
Start: 2018-07-24 | End: 2018-07-24 | Stop reason: SURG

## 2018-07-24 RX ORDER — PROMETHAZINE HYDROCHLORIDE 25 MG/1
25 TABLET ORAL ONCE AS NEEDED
Status: DISCONTINUED | OUTPATIENT
Start: 2018-07-24 | End: 2018-07-24 | Stop reason: HOSPADM

## 2018-07-24 RX ORDER — PROMETHAZINE HYDROCHLORIDE 25 MG/1
25 SUPPOSITORY RECTAL ONCE AS NEEDED
Status: DISCONTINUED | OUTPATIENT
Start: 2018-07-24 | End: 2018-07-24 | Stop reason: HOSPADM

## 2018-07-24 RX ADMIN — MIDAZOLAM 1 MG: 1 INJECTION INTRAMUSCULAR; INTRAVENOUS at 11:03

## 2018-07-24 RX ADMIN — LIDOCAINE HYDROCHLORIDE 70 MG: 10 INJECTION, SOLUTION INFILTRATION; PERINEURAL at 11:03

## 2018-07-24 RX ADMIN — FENTANYL CITRATE 50 MCG: 50 INJECTION, SOLUTION INTRAMUSCULAR; INTRAVENOUS at 11:03

## 2018-07-24 RX ADMIN — DEXTROSE AND SODIUM CHLORIDE 30 ML/HR: 5; 450 INJECTION, SOLUTION INTRAVENOUS at 10:48

## 2018-07-24 RX ADMIN — PROPOFOL 20 MG: 10 INJECTION, EMULSION INTRAVENOUS at 11:10

## 2018-07-24 RX ADMIN — PROPOFOL 50 MG: 10 INJECTION, EMULSION INTRAVENOUS at 11:09

## 2018-07-24 NOTE — H&P
Armen Bridges DO,[facility]  Gastroenterology  Hepatology  Endoscopy  Board Certified in Internal Medicine and gastroenterology  [address], [address]  [place]. [address]  - [phone]   F - [phone]     GASTROENTEROLOGY HISTORY AND PHYSICAL  NOTE   ARMEN BRIDGES DO.         SUBJECTIVE:   7/24/2018    Name: Fouzia Maldonado  DOD: 1950        Chief Complaint:       Subjective : Gastric ulcer    Patient is 68 y.o. female presents with desire for follow-up of gastric ulcer.      ROS/HISTORY/ CURRENT MEDICATIONS/OBJECTIVE/VS/PE:   Review of Systems:   Review of Systems    History:     Past Medical History:   Diagnosis Date   • Anemia    • Arthritis    • Coronary atherosclerosis of native coronary artery    • Depression    • Hearing loss of both ears     can hear nothing out of left ear and has right ear loss as well   • Hyperlipidemia    • Hypertension    • Meniere disease    • Nonrheumatic mitral valve disorder    • Pancreatitis    • Precordial pain    • Preprocedural cardiovascular examination    • Stomach ulcer    • Suicide attempt by drug ingestion (CMS/HCC) 1998   • Transfusion history      Past Surgical History:   Procedure Laterality Date   • CARDIAC CATHETERIZATION     • ECHO - CONVERTED  03/02/2014    Normal LV systolic function with EF of 60%. Early diastolic dysfunction. Left atrium moderately dilated. Normal RV size and function. Trace mitral and mild tricuspid regurg. No intracardiac mass, pericardial effusion or cardiac thrombus seen   • ENDOSCOPY N/A 3/27/2017    Procedure: ESOPHAGOGASTRODUODENOSCOPY WITH CONTROL OF BLEED;  Surgeon: Armen Bridges DO;  Location: [facility] ENDOSCOPY;  Service:    • HYSTERECTOMY     • JOINT REPLACEMENT     • KNEE SURGERY Bilateral 2015    bilateral knee replacements   • TUBAL ABDOMINAL LIGATION  1973   • TUMOR REMOVAL Left 1970    fatty tumor removed from left breast   • TUMOR REMOVAL Right 1971    fatty tumor removal from the right  shoulder     Family History   Problem Relation Age of Onset   • Heart disease Mother    • Cancer Mother         colon   • Early death Mother 68        colon cancer   • Hypertension Mother    • Hyperlipidemia Mother    • Heart disease Father    • Alcohol abuse Father    • Early death Father 56        heart attack   • Hypertension Father    • Hyperlipidemia Father    • Cancer Maternal Grandmother         stomach   • Cancer Maternal Grandfather    • Cancer Paternal Grandfather         stomach     Social History   Substance Use Topics   • Smoking status: Former Smoker   • Smokeless tobacco: Never Used   • Alcohol use No     Prescriptions Prior to Admission   Medication Sig Dispense Refill Last Dose   • amLODIPine (NORVASC) 5 MG tablet Take 5 mg by mouth Daily.   7/24/2018 at Unknown time   • aspirin 81 MG chewable tablet Chew 81 mg Daily.   7/23/2018   • ferrous sulfate 324 (65 FE) MG tablet delayed-release EC tablet Take 1 tablet by mouth Daily With Breakfast. 10 tablet 0 7/23/2018 at Unknown time   • FLUoxetine (PROzac) 20 MG capsule Take 20 mg by mouth Daily.   7/23/2018 at Unknown time   • gabapentin (NEURONTIN) 300 MG capsule Take 300 mg by mouth 2 (Two) Times a Day. 0.5 tab in am / 1 at night   7/23/2018 at Unknown time   • gemfibrozil (LOPID) 600 MG tablet Take 600 mg by mouth 2 (Two) Times a Day Before Meals.   7/23/2018 at Unknown time   • hydrochlorothiazide (MICROZIDE) 12.5 MG capsule Take 12.5 mg by mouth Daily.   7/23/2018 at Unknown time   • Magnesium Hydroxide (MAGNESIA PO) Take 250 mg by mouth Daily.   7/23/2018 at Unknown time   • meloxicam (MOBIC) 15 MG tablet TAKE 1 TABLET EVERY DAY WITH A MEAL 90 tablet 4 7/23/2018 at Unknown time   • pantoprazole (PROTONIX) 40 MG EC tablet Take 40 mg by mouth 2 (Two) Times a Day.   7/23/2018 at Unknown time   • Potassium 99 MG tablet Take 1 tablet by mouth Every Night.   7/23/2018 at Unknown time   • pravastatin (PRAVACHOL) 40 MG tablet Take 40 mg by mouth Daily.    7/23/2018 at Unknown time   • sucralfate (CARAFATE) 1 g tablet Take 1 g by mouth 4 (Four) Times a Day.   7/23/2018 at Unknown time   • vitamin B-12 (CYANOCOBALAMIN) 1000 MCG tablet Take 1,000 mcg by mouth Daily.   7/23/2018 at Unknown time   • vitamin D (ERGOCALCIFEROL) 27586 UNITS capsule capsule Take 50,000 Units by mouth 1 (One) Time Per Week.   7/23/2018 at Unknown time     Allergies:  Amoxicillin and Darvon [propoxyphene]    I have reviewed the patients medical history, surgical history and family history in the available medical record system.     Current Medications:     Current Facility-Administered Medications   Medication Dose Route Frequency Provider Last Rate Last Dose   • dexamethasone (DECADRON) IVPB 8 mg  8 mg Intravenous Once PRN America Nicholas CRNA       • dextrose 5 % and sodium chloride 0.45 % infusion  30 mL/hr Intravenous Continuous Eladio Esquivel DO 30 mL/hr at 07/24/18 1048 30 mL/hr at 07/24/18 1048   • meperidine (DEMEROL) injection 12.5 mg  12.5 mg Intravenous Q5 Min PRN America Nicholas CRNA       • ondansetron (ZOFRAN) injection 4 mg  4 mg Intravenous Once PRN America Nicholas CRNA       • promethazine (PHENERGAN) injection 12.5 mg  12.5 mg Intravenous Once PRN America Nicholas CRNA        Or   • promethazine (PHENERGAN) injection 12.5 mg  12.5 mg Intramuscular Once PRN America Nicholas CRNA        Or   • promethazine (PHENERGAN) suppository 25 mg  25 mg Rectal Once PRN America Nicholas CRNA        Or   • promethazine (PHENERGAN) tablet 25 mg  25 mg Oral Once PRN America Nicholas CRNA           Objective     Physical Exam:   Temp:  [97 °F (36.1 °C)] 97 °F (36.1 °C)  Heart Rate:  [74] 74  Resp:  [16] 16  BP: (152)/(95) 152/95    Physical Exam:  General Appearance:    Alert, cooperative, in no acute distress   Head:    Normocephalic, without obvious abnormality, atraumatic   Eyes:            Lids and lashes normal, conjunctivae and  sclerae normal, no   icterus, no pallor, corneas clear, PERRLA   Ears:    Ears appear intact with no abnormalities noted   Throat:   No oral lesions, no thrush, oral mucosa moist   Neck:   No adenopathy, supple, trachea midline, no thyromegaly, no     carotid bruit, no JVD   Back:     No kyphosis present, no scoliosis present, no skin lesions,       erythema or scars, no tenderness to percussion or                   palpation,   range of motion normal   Lungs:     Clear to auscultation,respirations regular, even and                   unlabored    Heart:    Regular rhythm and normal rate, normal S1 and S2, no            murmur, no gallop, no rub, no click   Breast Exam:    Deferred   Abdomen:     Normal bowel sounds, no masses, no organomegaly, soft        non-tender, non-distended, no guarding, no rebound                 tenderness   Genitalia:    Deferred   Extremities:   Moves all extremities well, no edema, no cyanosis, no              redness   Pulses:   Pulses palpable and equal bilaterally   Skin:   No bleeding, bruising or rash   Lymph nodes:   No palpable adenopathy   Neurologic:   Cranial nerves 2 - 12 grossly intact, sensation intact, DTR        present and equal bilaterally      Results Review:     Lab Results   Component Value Date    WBC 6.06 03/28/2017    WBC 6.44 03/27/2017    WBC 5.54 03/26/2017    HGB 7.7 (L) 03/28/2017    HGB 7.4 (L) 03/27/2017    HGB 7.4 (L) 03/26/2017    HCT 25.2 (L) 03/28/2017    HCT 24.2 (L) 03/27/2017    HCT 23.7 (L) 03/26/2017     03/28/2017     03/27/2017     03/26/2017             No results found for: LIPASE  Lab Results   Component Value Date    INR 0.98 03/24/2017    INR 1.0 06/26/2016    INR 2.4 07/17/2015          Radiology Review:  Imaging Results (last 72 hours)     ** No results found for the last 72 hours. **           I reviewed the patient's new clinical results.  I reviewed the patient's new imaging results and agree with the  interpretation.     ASSESSMENT/PLAN:   ASSESSMENT:   1.  Gastric ulcer  2.  Hiatal hernia    PLAN:   1.  Esophagogastroduodenoscopy with biopsies    Risk and benefits associated with the procedure are reviewed with the patient.  The patient wishes to proceed      Eladio Esquivel DO  07/24/18  10:59 AM

## 2018-07-24 NOTE — ANESTHESIA POSTPROCEDURE EVALUATION
Patient: Fouzia Maldonado    Procedure Summary     Date:  07/24/18 Room / Location:  [facility] ENDOSCOPY 2 / [facility] ENDOSCOPY    Anesthesia Start:  1100 Anesthesia Stop:  1113    Procedure:  ESOPHAGOGASTRODUODENOSCOPY (N/A ) Diagnosis:       Peptic ulcer of stomach      (Peptic ulcer of stomach [K25.9])    Surgeon:  Eladio Esquivel DO Provider:  America Nicholas CRNA    Anesthesia Type:  MAC ASA Status:  3          Anesthesia Type: MAC  Last vitals  BP   152/95 (07/24/18 1026)   Temp   97 °F (36.1 °C) (07/24/18 1026)   Pulse   74 (07/24/18 1026)   Resp   16 (07/24/18 1026)     SpO2         Post Anesthesia Care and Evaluation    Patient location during evaluation: bedside  Patient participation: complete - patient participated  Level of consciousness: awake and awake and alert  Pain management: adequate  Airway patency: patent  Anesthetic complications: No anesthetic complications  PONV Status: none  Cardiovascular status: acceptable  Respiratory status: acceptable  Hydration status: acceptable

## 2018-07-24 NOTE — ANESTHESIA PREPROCEDURE EVALUATION
Anesthesia Evaluation     Patient summary reviewed and Nursing notes reviewed   no history of anesthetic complications:               Airway   Mallampati: II  TM distance: >3 FB  Neck ROM: full  Possible difficult intubation  Dental    (+) upper dentures and lower dentures    Pulmonary    (+) shortness of breath, decreased breath sounds,   Cardiovascular - normal exam    (+) hypertension well controlled less than 2 medications, valvular problems/murmurs MR, CAD, hyperlipidemia,     ROS comment: DAHIANA 2-1-17:    · Mild-to-moderate mitral valve regurgitation is present  · Left ventricular wall thickness is consistent with mild concentric hypertrophy.  · Mild pulmonic valve regurgitation is present.  · Estimated EF = 66%.  · Left atrial cavity size is mild-to-moderately dilated.    Neuro/Psych  (+) psychiatric history Depression,     GI/Hepatic/Renal/Endo      Musculoskeletal     Abdominal   (+) obese,    Substance History      OB/GYN          Other   (+) blood dyscrasia, arthritis                   Anesthesia Plan    ASA 3     MAC     Anesthetic plan and risks discussed with patient.

## 2018-09-14 ENCOUNTER — DOCUMENTATION (OUTPATIENT)
Dept: CARDIOLOGY | Facility: CLINIC | Age: 68
End: 2018-09-14

## 2018-09-14 ENCOUNTER — OFFICE VISIT (OUTPATIENT)
Dept: CARDIOLOGY | Facility: CLINIC | Age: 68
End: 2018-09-14

## 2018-09-14 VITALS
WEIGHT: 171 LBS | BODY MASS INDEX: 29.19 KG/M2 | DIASTOLIC BLOOD PRESSURE: 74 MMHG | HEIGHT: 64 IN | SYSTOLIC BLOOD PRESSURE: 158 MMHG

## 2018-09-14 DIAGNOSIS — I25.10 CORONARY ARTERY DISEASE INVOLVING NATIVE CORONARY ARTERY OF NATIVE HEART WITHOUT ANGINA PECTORIS: ICD-10-CM

## 2018-09-14 DIAGNOSIS — I34.0 NON-RHEUMATIC MITRAL REGURGITATION: ICD-10-CM

## 2018-09-14 DIAGNOSIS — I10 ESSENTIAL HYPERTENSION: Primary | ICD-10-CM

## 2018-09-14 DIAGNOSIS — E78.5 HYPERLIPIDEMIA, UNSPECIFIED HYPERLIPIDEMIA TYPE: ICD-10-CM

## 2018-09-14 PROCEDURE — 93000 ELECTROCARDIOGRAM COMPLETE: CPT | Performed by: INTERNAL MEDICINE

## 2018-09-14 PROCEDURE — 99214 OFFICE O/P EST MOD 30 MIN: CPT | Performed by: INTERNAL MEDICINE

## 2018-09-14 RX ORDER — ENALAPRIL MALEATE 5 MG/1
5 TABLET ORAL DAILY
Qty: 30 TABLET | Refills: 5 | Status: SHIPPED | OUTPATIENT
Start: 2018-09-14 | End: 2019-01-02 | Stop reason: SDUPTHER

## 2018-09-14 RX ORDER — ENALAPRIL MALEATE 5 MG/1
5 TABLET ORAL DAILY
Qty: 30 TABLET | Refills: 5 | Status: SHIPPED | OUTPATIENT
Start: 2018-09-14 | End: 2018-09-14 | Stop reason: SDUPTHER

## 2018-09-14 NOTE — PROGRESS NOTES
Fouzia Zuleikaleopoldo Maldonado  68 y.o. female    09/14/2018  1. Essential hypertension    2. Hyperlipidemia, unspecified hyperlipidemia type    3. Coronary artery disease involving native coronary artery of native heart without angina pectoris    4. Non-rheumatic mitral regurgitation        History of Present Illness:    68 years old patient with history of hypertension hypertensive heart disease mild-to-moderate regurgitation , nonobstructive coronary to disease underwent  stress test reported normal left  systolic function no evidence of ischemia.  The patient had history of pancreatitis complicated with GI bleeding.  She continued sick in the stomach with symptom of nauseous / dyspeptic symptom and and discomfort in the epigastric area.  She ran out Ranexa and Cipro sample given to her.  Her blood pressure noted in the higher side she stopped taking Vasotec on her own.  We'll restart back on.   She denies any typical chest pain.  Denies any orthopnea PND or become short-winded with a moderate level of physical activity possibility of physical deconditioning cannot be excluded.    ECHO 1/2017  · Mild-to-moderate mitral valve regurgitation is present  · Left ventricular wall thickness is consistent with mild concentric hypertrophy.  · Mild pulmonic valve regurgitation is present.  · Estimated EF = 66%.  · Left atrial cavity size is mild-to-moderately dilated.      2017  · Myocardial perfusion imaging indicates a normal myocardial perfusion study with no evidence of ischemia.  · Impressions are consistent with a low risk study.  · Left ventricular ejection fraction is normal (Calculated EF = 61%  SUBJECTIVE:    Allergies   Allergen Reactions   • Amoxicillin Rash   • Darvon [Propoxyphene] Rash         Past Medical History:   Diagnosis Date   • Anemia    • Arthritis    • Coronary atherosclerosis of native coronary artery    • Depression    • Hearing loss of both ears     can hear nothing out of left ear and has right  ear loss as well   • Hyperlipidemia    • Hypertension    • Meniere disease    • Nonrheumatic mitral valve disorder    • Pancreatitis    • Precordial pain    • Preprocedural cardiovascular examination    • Stomach ulcer    • Suicide attempt by drug ingestion (CMS/HCC) 1998   • Transfusion history          Past Surgical History:   Procedure Laterality Date   • CARDIAC CATHETERIZATION     • ECHO - CONVERTED  03/02/2014    Normal LV systolic function with EF of 60%. Early diastolic dysfunction. Left atrium moderately dilated. Normal RV size and function. Trace mitral and mild tricuspid regurg. No intracardiac mass, pericardial effusion or cardiac thrombus seen   • ENDOSCOPY N/A 3/27/2017    Procedure: ESOPHAGOGASTRODUODENOSCOPY WITH CONTROL OF BLEED;  Surgeon: Eladio Esquivel DO;  Location: [facility] ENDOSCOPY;  Service:    • ENDOSCOPY N/A 7/24/2018    Procedure: ESOPHAGOGASTRODUODENOSCOPY;  Surgeon: Eladio Esquivel DO;  Location: [facility] ENDOSCOPY;  Service: Gastroenterology   • HYSTERECTOMY     • JOINT REPLACEMENT     • KNEE SURGERY Bilateral 2015    bilateral knee replacements   • TUBAL ABDOMINAL LIGATION  1973   • TUMOR REMOVAL Left 1970    fatty tumor removed from left breast   • TUMOR REMOVAL Right 1971    fatty tumor removal from the right shoulder         Family History   Problem Relation Age of Onset   • Heart disease Mother    • Cancer Mother         colon   • Early death Mother 68        colon cancer   • Hypertension Mother    • Hyperlipidemia Mother    • Heart disease Father    • Alcohol abuse Father    • Early death Father 56        heart attack   • Hypertension Father    • Hyperlipidemia Father    • Cancer Maternal Grandmother         stomach   • Cancer Maternal Grandfather    • Cancer Paternal Grandfather         stomach         Social History     Social History   • Marital status:      Spouse name: N/A   • Number of children: N/A   • Years of education: N/A     Occupational History   • Not on  file.     Social History Main Topics   • Smoking status: Former Smoker   • Smokeless tobacco: Never Used   • Alcohol use No   • Drug use: No   • Sexual activity: Defer     Other Topics Concern   • Not on file     Social History Narrative    Quit smoking 10 years ago    Lives with her     No drugs, no alcohol          Current Outpatient Prescriptions   Medication Sig Dispense Refill   • amLODIPine (NORVASC) 5 MG tablet Take 5 mg by mouth Daily.     • aspirin 81 MG chewable tablet Chew 81 mg Daily.     • FLUoxetine (PROzac) 20 MG capsule Take 20 mg by mouth Daily.     • gabapentin (NEURONTIN) 300 MG capsule Take 300 mg by mouth 2 (Two) Times a Day. 0.5 tab in am / 1 at night     • gemfibrozil (LOPID) 600 MG tablet Take 600 mg by mouth 2 (Two) Times a Day Before Meals.     • hydrochlorothiazide (MICROZIDE) 12.5 MG capsule Take 12.5 mg by mouth Daily.     • Magnesium Hydroxide (MAGNESIA PO) Take 250 mg by mouth Daily.     • meloxicam (MOBIC) 15 MG tablet TAKE 1 TABLET EVERY DAY WITH A MEAL 90 tablet 4   • pantoprazole (PROTONIX) 40 MG EC tablet Take 40 mg by mouth 2 (Two) Times a Day.     • Potassium 99 MG tablet Take 1 tablet by mouth Every Night.     • pravastatin (PRAVACHOL) 40 MG tablet Take 40 mg by mouth Daily.     • sucralfate (CARAFATE) 1 g tablet Take 1 g by mouth 4 (Four) Times a Day.     • vitamin B-12 (CYANOCOBALAMIN) 1000 MCG tablet Take 1,000 mcg by mouth Daily.     • vitamin D (ERGOCALCIFEROL) 68552 UNITS capsule capsule Take 50,000 Units by mouth 1 (One) Time Per Week.       No current facility-administered medications for this visit.            Review of Systems:     Constitutional:  Denies recent weight loss, weight gain, fever or chills, no change in exercise tolerance.     HENT:  Denies any hearing loss, epistaxis, hoarseness, or difficulty speaking.     Eyes: Wears eyeglasses or contact lenses.    Respiratory:  Denies dyspnea with exertion,no cough, wheezing, or hemoptysis.  "    Cardiovascular: See H&P    Gastrointestinal:  History of GI bleed    Endocrine: Negative for cold intolerance, heat intolerance, polydipsia, polyphagia and polyuria. Denies any history of weight change, polydipsia, polyuria.     Genitourinary: Negative.      Musculoskeletal: Denies any history of arthritic symptoms or back problems.     Skin:  Denies any change in hair or nails, rashes, or skin lesions.     Allergic/Immunologic: Negative.  Negative for environmental allergies, food allergies and immunocompromised state.     Neurological:  Denies any history of recurrent headaches, strokes, TIA, or seizure disorder.     Hematological: Denies any food allergies, seasonal allergies, bleeding disorders, or lymphadenopathy.     Psychiatric/Behavioral: Denies any history of depression, substance abuse, or change in cognitive function.       OBJECTIVE:    Ht 162.6 cm (64\")   Wt 77.6 kg (171 lb)   BMI 29.35 kg/m²       Physical Exam:     Constitutional: Cooperative, alert and oriented, well-developed, well-nourished, in no acute distress.     HENT:   Head: Normocephalic, normal hair patterns, no masses or tenderness.  Ears, Nose, and Throat: No gross abnormalities. No pallor or cyanosis. Dentition good.   Eyes: EOMS intact, PERRL, conjunctivae and lids unremarkable. Fundoscopic exam and visual fields not performed.   Neck: No palpable masses or adenopathy, no thyromegaly, no JVD, carotid pulses are full and equal bilaterally and without  Bruits.     Cardiovascular: Regular rhythm, S1 and S2 normal, no S3 or S4. Apical impulse not displaced. No murmurs, gallops, or rubs detected.     Pulmonary/Chest: Chest: normal symmetry, no tenderness to palpation, normal respiratory excursion, no intercostal retraction, no use of accessory muscles. Pulmonary: Normal breath sounds. No rales or rhonchi.    Abdominal: Abdomen soft, bowel sounds normoactive, no masses, no hepatosplenomegaly, non-tender, no bruits.     Musculoskeletal: " No deformities, clubbing, cyanosis, erythema, or edema observed. There are no spinal abnormalities noted. Normal muscle strength and tone. Pulses full and equal in all extremities, no bruits auscultated.     Neurological: No gross motor or sensory deficits noted, affect appropriate, oriented to time, person, place.     Skin: Warm and dry to the touch, no apparent skin lesions or masses noted.     Psychiatric: She has a normal mood and affect. Her behavior is normal. Judgment and thought content normal.           ECG 12 Lead  Date/Time: 9/14/2018 10:41 AM  Performed by: JARRET LAI  Authorized by: JARRET LAI   Comparison: not compared with previous ECG   Rhythm: sinus rhythm  Comments: Sinus rhythm with a nonspecific ST-T wave changes              Lab Results   Component Value Date    WBC 6.06 03/28/2017    HGB 7.7 (L) 03/28/2017    HCT 25.2 (L) 03/28/2017    MCV 68.5 (L) 03/28/2017     03/28/2017     Lab Results   Component Value Date    GLUCOSE 97 03/28/2017    BUN 12 03/28/2017    CREATININE 0.92 03/28/2017    EGFRIFNONA 61 03/28/2017    BCR 13.0 03/28/2017    CO2 24.0 03/28/2017    CALCIUM 8.7 03/28/2017    ALBUMIN 3.70 03/28/2017    AST 20 03/28/2017    ALT 21 03/28/2017     No results found for: CHOL  No results found for: TRIG  No results found for: HDL  No components found for: LDLCALC  No results found for: LDL  No results found for: HDLLDLRATIO  No components found for: CHOLHDL  No results found for: HGBA1C  Lab Results   Component Value Date    TSH 2.63 03/03/2014           ASSESSMENT AND PLAN:  #1 history of nonobstructive coronary to disease within normal recent stress test #2 hypertension with hypertensive heart disease #3 mild to moderate mitral regurgitation.  #4 dyspnea on exertion may be multifactorial needed in etiology.  #5 history of for dyspepsia/gastritis with previous history of pancreatitis complicated with GI bleed     Clinically there wasn't enough for any acute cardiac  decompensation based on the clinical history physical finding.  The shortness breath and admitted maybe multifactorial the possibility of physical deconditioning cannot be excluded.   complaint in the sickness in the stomach/nauseous feeling and at present being treated with Carafate and Prilosec.  She is a resident of [facility] hypertension being managed with amlodipine, hydrochlorothiazide.  Will add Vasotec as her blood pressure noted in the higher side.  The patient is under a lot of stress as her  suffered a massive stroke affecting her quality of life.  Given the chest discomfort and previous history of nonobstructive CAD , offered the patient  further risk stratification with a stress test or CT coronary angiogram.  The patient decided not to pursue as she was doing fine on Ranexa and  ran out.  Pravastatin for management of hyperlipidemia   Ranexa 500 mg twice a day and her stomach medication as before.  We'll lack to see her back in 6 month R depends on patient clinical conditions.    Fouzia was seen today for follow-up.    Diagnoses and all orders for this visit:    Essential hypertension    Hyperlipidemia, unspecified hyperlipidemia type    Coronary artery disease involving native coronary artery of native heart without angina pectoris    Non-rheumatic mitral regurgitation        Robbin Matthews MD  9/14/2018  10:13 AM

## 2018-11-02 ENCOUNTER — APPOINTMENT (OUTPATIENT)
Dept: ONCOLOGY | Facility: HOSPITAL | Age: 68
End: 2018-11-02

## 2018-11-02 ENCOUNTER — CONSULT (OUTPATIENT)
Dept: ONCOLOGY | Facility: CLINIC | Age: 68
End: 2018-11-02

## 2018-11-02 VITALS
BODY MASS INDEX: 28.03 KG/M2 | RESPIRATION RATE: 18 BRPM | HEART RATE: 74 BPM | OXYGEN SATURATION: 98 % | HEIGHT: 64 IN | WEIGHT: 164.2 LBS | TEMPERATURE: 98.4 F | SYSTOLIC BLOOD PRESSURE: 157 MMHG | DIASTOLIC BLOOD PRESSURE: 66 MMHG

## 2018-11-02 DIAGNOSIS — D50.9 IRON DEFICIENCY ANEMIA, UNSPECIFIED IRON DEFICIENCY ANEMIA TYPE: Primary | ICD-10-CM

## 2018-11-02 DIAGNOSIS — I25.10 CORONARY ARTERY DISEASE INVOLVING NATIVE CORONARY ARTERY OF NATIVE HEART WITHOUT ANGINA PECTORIS: ICD-10-CM

## 2018-11-02 DIAGNOSIS — K25.4 GASTROINTESTINAL HEMORRHAGE ASSOCIATED WITH GASTRIC ULCER: ICD-10-CM

## 2018-11-02 PROCEDURE — G0463 HOSPITAL OUTPT CLINIC VISIT: HCPCS | Performed by: INTERNAL MEDICINE

## 2018-11-02 PROCEDURE — 99205 OFFICE O/P NEW HI 60 MIN: CPT | Performed by: INTERNAL MEDICINE

## 2018-11-02 RX ORDER — TEMAZEPAM 15 MG/1
15 CAPSULE ORAL NIGHTLY
COMMUNITY
Start: 2018-10-10 | End: 2021-03-25

## 2018-11-02 RX ORDER — FAMOTIDINE 10 MG/ML
20 INJECTION, SOLUTION INTRAVENOUS ONCE
Status: CANCELLED | OUTPATIENT
Start: 2018-11-08

## 2018-11-02 RX ORDER — SODIUM CHLORIDE 9 MG/ML
250 INJECTION, SOLUTION INTRAVENOUS ONCE
Status: CANCELLED | OUTPATIENT
Start: 2018-11-08

## 2018-11-05 NOTE — PROGRESS NOTES
"Fouzia Zuleika Maldonado  [id number]  1950      REASON FOR CONSULTATION:  Anemia   Provide an opinion on any further workup or treatment                             REQUESTING PHYSICIAN:  Gian SHARP     RECORDS OBTAINED:  Records of the patients history including those obtained from the referring provider were reviewed and summarized in detail.      History of Present Illness     This is a very pleasant 68-year-old female who was seen in consultation at the request Gian SHARP for evaluation of anemia.  Patient is here today with her daughter and lives in [place].  Patient unfortunately is a poor historian and most of the history was obtained by reviewing old medical records. She has past medical history of coronary artery disease, hypertension, hyperlipidemia, mitral bowel disease, depression with suicidal attempt, pancreatitis and anemia.  Patient tells me that she is been anemic \"all her life\".  However over a period of last couple of years her anemia has been more severe.  She tells me that she is received 2 units of blood transfusion about 1 year ago for suspected GI bleeding.  She tells me that she had an extensive GI evaluation performed in the past without any particular etiology.  However, upon review it appears that recently she was diagnosed with gastric ulcer and is possible that her bleeding is related to ulcer.  Her recent upper endoscopy showed healing of her ulcer.  In addition patient also had a capsule endoscopy performed in 2017 which showed small bowel ulcerations and AV malformation.  Patient denies any bright red blood per rectum or melena at present.  Denies any hematemesis or hemoptysis or hematuria.. She tells me that she has received IV iron infusions in the past many years ago and tells me that has tolerated it well in the past.  She tells me that she is unable to take oral iron because of the GI discomfort.  I been asked to evaluate her anemia further.    Past " Medical History:   Diagnosis Date   • Anemia    • Arthritis    • Coronary atherosclerosis of native coronary artery    • Depression    • Hearing loss of both ears     can hear nothing out of left ear and has right ear loss as well   • Hyperlipidemia    • Hypertension    • Meniere disease    • Nonrheumatic mitral valve disorder    • Pancreatitis    • Precordial pain    • Preprocedural cardiovascular examination    • Stomach ulcer    • Suicide attempt by drug ingestion (CMS/HCC) 1998   • Transfusion history         Past Surgical History:   Procedure Laterality Date   • CARDIAC CATHETERIZATION     • ECHO - CONVERTED  03/02/2014    Normal LV systolic function with EF of 60%. Early diastolic dysfunction. Left atrium moderately dilated. Normal RV size and function. Trace mitral and mild tricuspid regurg. No intracardiac mass, pericardial effusion or cardiac thrombus seen   • ENDOSCOPY N/A 3/27/2017    Procedure: ESOPHAGOGASTRODUODENOSCOPY WITH CONTROL OF BLEED;  Surgeon: Eladio Esquivel DO;  Location: [facility] ENDOSCOPY;  Service:    • ENDOSCOPY N/A 7/24/2018    Procedure: ESOPHAGOGASTRODUODENOSCOPY;  Surgeon: Eladio Esquivel DO;  Location: [facility] ENDOSCOPY;  Service: Gastroenterology   • HYSTERECTOMY     • JOINT REPLACEMENT     • KNEE SURGERY Bilateral 2015    bilateral knee replacements   • TUBAL ABDOMINAL LIGATION  1973   • TUMOR REMOVAL Left 1970    fatty tumor removed from left breast   • TUMOR REMOVAL Right 1971    fatty tumor removal from the right shoulder        Current Outpatient Prescriptions on File Prior to Visit   Medication Sig Dispense Refill   • amLODIPine (NORVASC) 5 MG tablet Take 5 mg by mouth Daily.     • aspirin 81 MG chewable tablet Chew 81 mg Daily.     • enalapril (VASOTEC) 5 MG tablet Take 1 tablet by mouth Daily. 30 tablet 5   • FLUoxetine (PROzac) 20 MG capsule Take 20 mg by mouth Daily.     • gabapentin (NEURONTIN) 300 MG capsule Take 300 mg by mouth 2 (Two) Times a Day. 0.5 tab in am / 1 at  night     • gemfibrozil (LOPID) 600 MG tablet Take 600 mg by mouth 2 (Two) Times a Day Before Meals.     • hydrochlorothiazide (MICROZIDE) 12.5 MG capsule Take 12.5 mg by mouth Daily.     • Magnesium Hydroxide (MAGNESIA PO) Take 250 mg by mouth Daily.     • meloxicam (MOBIC) 15 MG tablet TAKE 1 TABLET EVERY DAY WITH A MEAL 90 tablet 4   • pantoprazole (PROTONIX) 40 MG EC tablet Take 40 mg by mouth 2 (Two) Times a Day.     • Potassium 99 MG tablet Take 1 tablet by mouth Every Night.     • pravastatin (PRAVACHOL) 40 MG tablet Take 40 mg by mouth Daily.     • sucralfate (CARAFATE) 1 g tablet Take 1 g by mouth 4 (Four) Times a Day.     • vitamin B-12 (CYANOCOBALAMIN) 1000 MCG tablet Take 1,000 mcg by mouth Daily.     • vitamin D (ERGOCALCIFEROL) 53711 UNITS capsule capsule Take 50,000 Units by mouth 1 (One) Time Per Week.       No current facility-administered medications on file prior to visit.         ALLERGIES:    Allergies   Allergen Reactions   • Amoxicillin Rash   • Darvon [Propoxyphene] Rash        Social History     Social History   • Marital status:      Social History Main Topics   • Smoking status: Former Smoker   • Smokeless tobacco: Never Used   • Alcohol use No   • Drug use: No   • Sexual activity: Defer     Other Topics Concern   • Not on file     Social History Narrative    Quit smoking 10 years ago    Lives with her     No drugs, no alcohol         Family History   Problem Relation Age of Onset   • Heart disease Mother    • Cancer Mother         colon   • Early death Mother 68        colon cancer   • Hypertension Mother    • Hyperlipidemia Mother    • Heart disease Father    • Alcohol abuse Father    • Early death Father 56        heart attack   • Hypertension Father    • Hyperlipidemia Father    • Cancer Maternal Grandmother         stomach   • Cancer Maternal Grandfather    • Cancer Paternal Grandfather         stomach        Review of Systems     CONSTITUTIONAL: fatigue +  Weakness + No  "weight loss, fever, chills.   HEENT: Eyes: hearing loss + No visual loss, blurred vision, double vision or yellow sclerae. Ears, Nose, Throat: No hearing loss, sneezing, congestion, runny nose or sore throat.  SKIN: No rash or itching.  CARDIOVASCULAR: No chest pain, chest pressure or chest discomfort. No palpitations or edema.  RESPIRATORY: No shortness of breath, cough or sputum.  GASTROINTESTINAL: No anorexia, nausea, vomiting or diarrhea. No abdominal pain or blood.  GENITOURINARY: Negative for urgency, frequency or dysuria.   NEUROLOGICAL: chronic dizziness and vertigo + No headache, syncope, paralysis, ataxia, numbness or tingling in the extremities. No change in bowel or bladder control.  MUSCULOSKELETAL: chronic arthritis pain +   HEMATOLOGIC: anemia + No  bleeding or bruising.  LYMPHATICS: No enlarged nodes. No history of splenectomy.  PSYCHIATRIC: depression +   ENDOCRINOLOGIC: No reports of sweating, cold or heat intolerance. No polyuria or polydipsia.  ALLERGIES: No history of asthma, hives, eczema or rhinitis.      Objective     Vitals:    11/02/18 1308 11/02/18 1317   BP: 180/87  Comment: Pt has taken BP meds 157/66   Pulse: 74 74   Resp: 18    Temp: 98.4 °F (36.9 °C)    TempSrc: Temporal Artery     SpO2: 98%    Weight: 74.5 kg (164 lb 3.2 oz)    Height: 162.6 cm (64.02\")    PainSc: 0-No pain      Current Status 11/2/2018   ECOG score 0       Physical Exam    General: Alert, awake, oriented.  Well dressed.  Not in apparent distress. Vitals as above.   HEAD: normocephalic, atraumatic.   EYES: PERRL, EOMI. Fundi normal, vision is grossly intact.  Neck: Supple, no adenopathy or thyromegaly.   Throat: normal oral cavity and pharynx. No inflammation, swelling, exudate, or lesions.  CARDIAC: Normal S1 and S2. No S3, S4 or murmurs. Rhythm is regular.Extremities are warm and well perfused.   LUNGS: Clear to auscultation and percussion without rales, rhonchi, wheezing or diminished breath sounds.  ABDOMEN: " Positive bowel sounds. Soft, nondistended, nontender  . No guarding or rebound. No masses.  Back: . No bony tenderness.   EXTREMITIES: Diffuse arthritis involving multiple joints +  Peripheral pulses intact. No varicosities.  Skin: No rash or bruising.  Neurological: Grossly non-focal exam. No focal weakness. Gait: Normal.   Psych: Mood and affect normal. No hallucination or suicidal thoughts.   Lymphatics: No cervical, axillary adenopathy.       RECENT LABS:Labs independently reviewed and summarized.   Hematology WBC   Date Value Ref Range Status   03/28/2017 6.06 3.20 - 9.80 10*3/mm3 Final     RBC   Date Value Ref Range Status   03/28/2017 3.68 (L) 3.77 - 5.16 10*6/mm3 Final     Hemoglobin   Date Value Ref Range Status   03/28/2017 7.7 (L) 12.0 - 15.5 g/dL Final     Hematocrit   Date Value Ref Range Status   03/28/2017 25.2 (L) 35.0 - 45.0 % Final     Platelets   Date Value Ref Range Status   03/28/2017 360 150 - 450 10*3/mm3 Final        Lab Results   Component Value Date    GLUCOSE 97 03/28/2017    BUN 12 03/28/2017    CREATININE 0.92 03/28/2017    EGFRIFNONA 61 03/28/2017    BCR 13.0 03/28/2017    K 3.4 (L) 03/28/2017    CO2 24.0 03/28/2017    CALCIUM 8.7 03/28/2017    ALBUMIN 3.70 03/28/2017    AST 20 03/28/2017    ALT 21 03/28/2017       Upper endoscopy from 7/24/18 result reviewed. Showed large hiatal hernia. Prior ulcers have healed.   Small bowel capsule endoscopy result from 5/12/17 reviewed and showed small bowel ulcers with bleeding. AVM of small bowel also noted.     I have reviewed old records and summarized them in HPI as well as assessment and plan section of this note.     Diagnosis:   (1) Iron deficiency anemia   (2) GI bleeding   (3) History of small bowel AVM, ulcers  (4) Coronary artery disease     All are new problems/issues for me.     Assessment/Plan     (1) Iron deficiency anemia, intolerance to oral iron:     Patient with iron deficiency anemia from likely occult GI bleeding is presenting  with severe iron deficiency with ferritin of 9, % saturation of 4, total iron of 17. She is unable to tolerate oral iron due to Gi side effects.     I had an extensive discussion with patient about diagnosis and treatment options. I recommend that we replaced her iron with IV Feraheme 510 mg every week for 3 weeks. I also recommend that we should check  iron studies every 3 months after this initial replacement and consider IV iron treatment for ferritin drops to less than 50 or transference saturation dropped to less than 20%. I had an extensive discussion with her about risk versus benefits of IV iron treatment.    I discussed about various risks associated with IV iron such as allergic reaction, hypersensitivity reaction, headache, flushing, joint aches or pains, local IV infiltration and skin discoloration.  After our discussion patient was in agreement and proceeding with IV iron treatment for  anemia.    (2) GI bleeding , (3) History of small bowel AVM, ulcers: Patient with history of GI bleeding from gastric ulcer as well as small bowel AVM/ulcers. Recent upper endoscopy showed resolved/healed gastric ulcer.     (4) Coronary artery disease: Stable. On aspirin. Follow up with cardiology.     Thank you for involving me in Ms Maldonado' care.     Please call us if any questions/concerns.     Sudeep Jones MD   Hematology Oncology

## 2018-11-08 ENCOUNTER — INFUSION (OUTPATIENT)
Dept: ONCOLOGY | Facility: HOSPITAL | Age: 68
End: 2018-11-08

## 2018-11-08 VITALS
RESPIRATION RATE: 18 BRPM | SYSTOLIC BLOOD PRESSURE: 146 MMHG | HEART RATE: 83 BPM | TEMPERATURE: 97.6 F | DIASTOLIC BLOOD PRESSURE: 67 MMHG

## 2018-11-08 DIAGNOSIS — D62 ACUTE BLOOD LOSS ANEMIA: ICD-10-CM

## 2018-11-08 DIAGNOSIS — D50.9 IRON DEFICIENCY ANEMIA, UNSPECIFIED IRON DEFICIENCY ANEMIA TYPE: Primary | ICD-10-CM

## 2018-11-08 PROCEDURE — 96374 THER/PROPH/DIAG INJ IV PUSH: CPT | Performed by: INTERNAL MEDICINE

## 2018-11-08 PROCEDURE — 25010000002 FERUMOXYTOL 510 MG/17ML SOLUTION 510 MG VIAL: Performed by: INTERNAL MEDICINE

## 2018-11-08 PROCEDURE — 96375 TX/PRO/DX INJ NEW DRUG ADDON: CPT | Performed by: INTERNAL MEDICINE

## 2018-11-08 RX ORDER — SODIUM CHLORIDE 9 MG/ML
250 INJECTION, SOLUTION INTRAVENOUS ONCE
Status: CANCELLED | OUTPATIENT
Start: 2018-11-08

## 2018-11-08 RX ORDER — FAMOTIDINE 10 MG/ML
20 INJECTION, SOLUTION INTRAVENOUS ONCE
Status: CANCELLED | OUTPATIENT
Start: 2018-11-08

## 2018-11-08 RX ORDER — FAMOTIDINE 10 MG/ML
20 INJECTION, SOLUTION INTRAVENOUS ONCE
Status: COMPLETED | OUTPATIENT
Start: 2018-11-08 | End: 2018-11-08

## 2018-11-08 RX ORDER — SODIUM CHLORIDE 9 MG/ML
250 INJECTION, SOLUTION INTRAVENOUS ONCE
Status: COMPLETED | OUTPATIENT
Start: 2018-11-08 | End: 2018-11-08

## 2018-11-08 RX ADMIN — SODIUM CHLORIDE 250 ML: 9 INJECTION, SOLUTION INTRAVENOUS at 13:49

## 2018-11-08 RX ADMIN — FAMOTIDINE 20 MG: 10 INJECTION INTRAVENOUS at 13:59

## 2018-11-08 RX ADMIN — FERUMOXYTOL 510 MG: 510 INJECTION INTRAVENOUS at 14:18

## 2018-11-15 ENCOUNTER — INFUSION (OUTPATIENT)
Dept: ONCOLOGY | Facility: HOSPITAL | Age: 68
End: 2018-11-15

## 2018-11-15 VITALS
HEART RATE: 76 BPM | TEMPERATURE: 97.5 F | DIASTOLIC BLOOD PRESSURE: 74 MMHG | SYSTOLIC BLOOD PRESSURE: 157 MMHG | RESPIRATION RATE: 18 BRPM

## 2018-11-15 DIAGNOSIS — D50.9 IRON DEFICIENCY ANEMIA, UNSPECIFIED IRON DEFICIENCY ANEMIA TYPE: Primary | ICD-10-CM

## 2018-11-15 DIAGNOSIS — D62 ACUTE BLOOD LOSS ANEMIA: ICD-10-CM

## 2018-11-15 PROCEDURE — 96374 THER/PROPH/DIAG INJ IV PUSH: CPT | Performed by: NURSE PRACTITIONER

## 2018-11-15 PROCEDURE — 25010000002 FERUMOXYTOL 510 MG/17ML SOLUTION 510 MG VIAL: Performed by: INTERNAL MEDICINE

## 2018-11-15 PROCEDURE — 96375 TX/PRO/DX INJ NEW DRUG ADDON: CPT | Performed by: NURSE PRACTITIONER

## 2018-11-15 RX ORDER — SODIUM CHLORIDE 9 MG/ML
250 INJECTION, SOLUTION INTRAVENOUS ONCE
Status: CANCELLED | OUTPATIENT
Start: 2018-11-15

## 2018-11-15 RX ORDER — FAMOTIDINE 10 MG/ML
20 INJECTION, SOLUTION INTRAVENOUS ONCE
Status: COMPLETED | OUTPATIENT
Start: 2018-11-15 | End: 2018-11-15

## 2018-11-15 RX ORDER — FAMOTIDINE 10 MG/ML
20 INJECTION, SOLUTION INTRAVENOUS ONCE
Status: CANCELLED | OUTPATIENT
Start: 2018-11-15

## 2018-11-15 RX ORDER — SODIUM CHLORIDE 9 MG/ML
250 INJECTION, SOLUTION INTRAVENOUS ONCE
Status: COMPLETED | OUTPATIENT
Start: 2018-11-15 | End: 2018-11-15

## 2018-11-15 RX ADMIN — SODIUM CHLORIDE 250 ML: 9 INJECTION, SOLUTION INTRAVENOUS at 13:45

## 2018-11-15 RX ADMIN — FAMOTIDINE 20 MG: 10 INJECTION INTRAVENOUS at 13:53

## 2018-11-15 RX ADMIN — FERUMOXYTOL 510 MG: 510 INJECTION INTRAVENOUS at 14:16

## 2018-11-27 ENCOUNTER — INFUSION (OUTPATIENT)
Dept: ONCOLOGY | Facility: HOSPITAL | Age: 68
End: 2018-11-27

## 2018-11-27 VITALS — DIASTOLIC BLOOD PRESSURE: 81 MMHG | HEART RATE: 74 BPM | SYSTOLIC BLOOD PRESSURE: 172 MMHG

## 2018-11-27 DIAGNOSIS — D62 ACUTE BLOOD LOSS ANEMIA: ICD-10-CM

## 2018-11-27 DIAGNOSIS — D50.9 IRON DEFICIENCY ANEMIA, UNSPECIFIED IRON DEFICIENCY ANEMIA TYPE: Primary | ICD-10-CM

## 2018-11-27 PROCEDURE — 25010000002 FERUMOXYTOL 510 MG/17ML SOLUTION 510 MG VIAL: Performed by: INTERNAL MEDICINE

## 2018-11-27 PROCEDURE — 96375 TX/PRO/DX INJ NEW DRUG ADDON: CPT | Performed by: INTERNAL MEDICINE

## 2018-11-27 PROCEDURE — 96374 THER/PROPH/DIAG INJ IV PUSH: CPT | Performed by: INTERNAL MEDICINE

## 2018-11-27 RX ORDER — FAMOTIDINE 10 MG/ML
20 INJECTION, SOLUTION INTRAVENOUS ONCE
Status: CANCELLED | OUTPATIENT
Start: 2018-11-27

## 2018-11-27 RX ORDER — FAMOTIDINE 10 MG/ML
20 INJECTION, SOLUTION INTRAVENOUS ONCE
Status: COMPLETED | OUTPATIENT
Start: 2018-11-27 | End: 2018-11-27

## 2018-11-27 RX ORDER — SODIUM CHLORIDE 9 MG/ML
250 INJECTION, SOLUTION INTRAVENOUS ONCE
Status: COMPLETED | OUTPATIENT
Start: 2018-11-27 | End: 2018-11-27

## 2018-11-27 RX ORDER — SODIUM CHLORIDE 9 MG/ML
250 INJECTION, SOLUTION INTRAVENOUS ONCE
Status: CANCELLED | OUTPATIENT
Start: 2018-11-27

## 2018-11-27 RX ADMIN — FAMOTIDINE 20 MG: 10 INJECTION INTRAVENOUS at 14:07

## 2018-11-27 RX ADMIN — FERUMOXYTOL 510 MG: 510 INJECTION INTRAVENOUS at 14:22

## 2018-11-27 RX ADMIN — SODIUM CHLORIDE 250 ML: 9 INJECTION, SOLUTION INTRAVENOUS at 14:07

## 2018-11-30 ENCOUNTER — DOCUMENTATION (OUTPATIENT)
Dept: CARDIOLOGY | Facility: CLINIC | Age: 68
End: 2018-11-30

## 2018-11-30 RX ORDER — RANOLAZINE 500 MG/1
500 TABLET, EXTENDED RELEASE ORAL 2 TIMES DAILY
COMMUNITY
End: 2019-04-30 | Stop reason: SDUPTHER

## 2018-12-03 ENCOUNTER — DOCUMENTATION (OUTPATIENT)
Dept: CARDIOLOGY | Facility: CLINIC | Age: 68
End: 2018-12-03

## 2019-01-02 RX ORDER — ENALAPRIL MALEATE 5 MG/1
TABLET ORAL
Qty: 90 TABLET | Refills: 5 | Status: SHIPPED | OUTPATIENT
Start: 2019-01-02 | End: 2021-03-25

## 2019-01-04 ENCOUNTER — LAB (OUTPATIENT)
Dept: ONCOLOGY | Facility: HOSPITAL | Age: 69
End: 2019-01-04

## 2019-01-04 ENCOUNTER — OFFICE VISIT (OUTPATIENT)
Dept: ONCOLOGY | Facility: CLINIC | Age: 69
End: 2019-01-04

## 2019-01-04 VITALS
BODY MASS INDEX: 27.9 KG/M2 | TEMPERATURE: 98.1 F | SYSTOLIC BLOOD PRESSURE: 176 MMHG | OXYGEN SATURATION: 98 % | WEIGHT: 163.4 LBS | DIASTOLIC BLOOD PRESSURE: 79 MMHG | RESPIRATION RATE: 18 BRPM | HEIGHT: 64 IN | HEART RATE: 66 BPM

## 2019-01-04 DIAGNOSIS — D62 ACUTE BLOOD LOSS ANEMIA: ICD-10-CM

## 2019-01-04 DIAGNOSIS — D50.9 IRON DEFICIENCY ANEMIA, UNSPECIFIED IRON DEFICIENCY ANEMIA TYPE: ICD-10-CM

## 2019-01-04 DIAGNOSIS — D50.9 IRON DEFICIENCY ANEMIA, UNSPECIFIED IRON DEFICIENCY ANEMIA TYPE: Primary | ICD-10-CM

## 2019-01-04 DIAGNOSIS — I25.10 CORONARY ARTERY DISEASE INVOLVING NATIVE CORONARY ARTERY OF NATIVE HEART WITHOUT ANGINA PECTORIS: ICD-10-CM

## 2019-01-04 LAB
BASOPHILS # BLD AUTO: 0.01 10*3/MM3 (ref 0–0.2)
BASOPHILS NFR BLD AUTO: 0.2 % (ref 0–2)
DEPRECATED RDW RBC AUTO: 57.8 FL (ref 36.4–46.3)
EOSINOPHIL # BLD AUTO: 0.14 10*3/MM3 (ref 0–0.7)
EOSINOPHIL NFR BLD AUTO: 2.4 % (ref 0–7)
ERYTHROCYTE [DISTWIDTH] IN BLOOD BY AUTOMATED COUNT: 20 % (ref 11.5–14.5)
FERRITIN SERPL-MCNC: 72.2 NG/ML (ref 11.1–264)
HCT VFR BLD AUTO: 39.2 % (ref 35–45)
HGB BLD-MCNC: 13.6 G/DL (ref 12–15.5)
IMM GRANULOCYTES # BLD AUTO: 0.01 10*3/MM3 (ref 0–0.02)
IMM GRANULOCYTES NFR BLD AUTO: 0.2 % (ref 0–0.5)
IRON 24H UR-MRATE: 81 MCG/DL (ref 37–170)
IRON SATN MFR SERPL: 30 % (ref 15–50)
LYMPHOCYTES # BLD AUTO: 1.42 10*3/MM3 (ref 0.6–4.2)
LYMPHOCYTES NFR BLD AUTO: 23.9 % (ref 10–50)
MCH RBC QN AUTO: 28.2 PG (ref 26.5–34)
MCHC RBC AUTO-ENTMCNC: 34.7 G/DL (ref 31.4–36)
MCV RBC AUTO: 81.3 FL (ref 80–98)
MONOCYTES # BLD AUTO: 0.65 10*3/MM3 (ref 0–0.9)
MONOCYTES NFR BLD AUTO: 10.9 % (ref 0–12)
NEUTROPHILS # BLD AUTO: 3.71 10*3/MM3 (ref 2–8.6)
NEUTROPHILS NFR BLD AUTO: 62.4 % (ref 37–80)
PLATELET # BLD AUTO: 201 10*3/MM3 (ref 150–450)
PMV BLD AUTO: 9.7 FL (ref 8–12)
RBC # BLD AUTO: 4.82 10*6/MM3 (ref 3.77–5.16)
TIBC SERPL-MCNC: 267 MCG/DL (ref 265–497)
WBC NRBC COR # BLD: 5.94 10*3/MM3 (ref 3.2–9.8)

## 2019-01-04 PROCEDURE — 36415 COLL VENOUS BLD VENIPUNCTURE: CPT | Performed by: INTERNAL MEDICINE

## 2019-01-04 PROCEDURE — 85025 COMPLETE CBC W/AUTO DIFF WBC: CPT

## 2019-01-04 PROCEDURE — 83540 ASSAY OF IRON: CPT

## 2019-01-04 PROCEDURE — 83550 IRON BINDING TEST: CPT

## 2019-01-04 PROCEDURE — 82728 ASSAY OF FERRITIN: CPT

## 2019-01-04 PROCEDURE — 99213 OFFICE O/P EST LOW 20 MIN: CPT | Performed by: INTERNAL MEDICINE

## 2019-01-04 PROCEDURE — G0463 HOSPITAL OUTPT CLINIC VISIT: HCPCS | Performed by: INTERNAL MEDICINE

## 2019-01-10 NOTE — PROGRESS NOTES
"Fouzia To iMssy Maldonado  [id number]  1950    Subjective     Ms Maldonado presented for her follow up appointment for iron deficiency anemia and blood loss anemia.   She is doing well.   No new health issues.   She is taking care of her  who has multiple health issues.   HG improved. She is feeling stronger.     History of Present Illness      This is a very pleasant 68-year-old female who was seen in consultation at the request Gian SHARP for evaluation of anemia.  Patient is here today with her daughter and lives in [place].  Patient unfortunately is a poor historian and most of the history was obtained by reviewing old medical records. She has past medical history of coronary artery disease, hypertension, hyperlipidemia, mitral bowel disease, depression with suicidal attempt, pancreatitis and anemia.  Patient tells me that she is been anemic \"all her life\".  However over a period of last couple of years her anemia has been more severe.  She tells me that she is received 2 units of blood transfusion about 1 year ago for suspected GI bleeding.  She tells me that she had an extensive GI evaluation performed in the past without any particular etiology.  However, upon review it appears that recently she was diagnosed with gastric ulcer and is possible that her bleeding is related to ulcer.  Her recent upper endoscopy showed healing of her ulcer.  In addition patient also had a capsule endoscopy performed in 2017 which showed small bowel ulcerations and AV malformation.  Patient denies any bright red blood per rectum or melena at present.  Denies any hematemesis or hemoptysis or hematuria.. She tells me that she has received IV iron infusions in the past many years ago and tells me that has tolerated it well in the past.  She tells me that she is unable to take oral iron because of the GI discomfort.  I been asked to evaluate her anemia further.      Active Ambulatory Problems     Diagnosis " Date Noted   • Pancreatitis    • Essential hypertension 01/10/2017   • Hyperlipidemia 01/10/2017   • Coronary artery disease involving native coronary artery of native heart without angina pectoris 01/10/2017   • Intercostal pain 01/10/2017   • Dyspnea 01/10/2017   • Non-rheumatic mitral regurgitation 03/20/2017   • Acute blood loss anemia 03/24/2017   • Pain of upper abdomen 03/24/2017   • Arthritis 03/24/2017   • Fever 03/25/2017   • Gastrointestinal hemorrhage associated with gastric ulcer 03/26/2017   • Iron deficiency anemia 11/02/2018     Resolved Ambulatory Problems     Diagnosis Date Noted   • No Resolved Ambulatory Problems     Past Medical History:   Diagnosis Date   • Anemia    • Arthritis    • Coronary atherosclerosis of native coronary artery    • Depression    • Hearing loss of both ears    • Hyperlipidemia    • Hypertension    • Meniere disease    • Nonrheumatic mitral valve disorder    • Pancreatitis    • Precordial pain    • Preprocedural cardiovascular examination    • Stomach ulcer    • Suicide attempt by drug ingestion (CMS/[facility]) 1998   • Transfusion history        Past Surgical History:   Procedure Laterality Date   • CARDIAC CATHETERIZATION     • ECHO - CONVERTED  03/02/2014    Normal LV systolic function with EF of 60%. Early diastolic dysfunction. Left atrium moderately dilated. Normal RV size and function. Trace mitral and mild tricuspid regurg. No intracardiac mass, pericardial effusion or cardiac thrombus seen   • ENDOSCOPY N/A 3/27/2017    Procedure: ESOPHAGOGASTRODUODENOSCOPY WITH CONTROL OF BLEED;  Surgeon: Eladio Esquivel DO;  Location: [facility] ENDOSCOPY;  Service:    • ENDOSCOPY N/A 7/24/2018    Procedure: ESOPHAGOGASTRODUODENOSCOPY;  Surgeon: Eladio Esquivel DO;  Location: [facility] ENDOSCOPY;  Service: Gastroenterology   • HYSTERECTOMY     • JOINT REPLACEMENT     • KNEE SURGERY Bilateral 2015    bilateral knee replacements   • TUBAL ABDOMINAL LIGATION  1973   • TUMOR REMOVAL Left  1970    fatty tumor removed from left breast   • TUMOR REMOVAL Right 1971    fatty tumor removal from the right shoulder       Social History     Socioeconomic History   • Marital status:      Spouse name: Not on file   • Number of children: Not on file   • Years of education: Not on file   • Highest education level: Not on file   Social Needs   • Financial resource strain: Not on file   • Food insecurity - worry: Not on file   • Food insecurity - inability: Not on file   • Transportation needs - medical: Not on file   • Transportation needs - non-medical: Not on file   Occupational History   • Not on file   Tobacco Use   • Smoking status: Former Smoker   • Smokeless tobacco: Never Used   Substance and Sexual Activity   • Alcohol use: No   • Drug use: No   • Sexual activity: Defer   Other Topics Concern   • Not on file   Social History Narrative    Quit smoking 10 years ago    Lives with her     No drugs, no alcohol        Family History   Problem Relation Age of Onset   • Heart disease Mother    • Cancer Mother         colon   • Early death Mother 68        colon cancer   • Hypertension Mother    • Hyperlipidemia Mother    • Heart disease Father    • Alcohol abuse Father    • Early death Father 56        heart attack   • Hypertension Father    • Hyperlipidemia Father    • Cancer Maternal Grandmother         stomach   • Cancer Maternal Grandfather    • Cancer Paternal Grandfather         stomach         Review of Systems   CONSTITUTIONAL: fatigue +  Weakness + No weight loss, fever, chills.   HEENT: Eyes: hearing loss + No visual loss, blurred vision, double vision or yellow sclerae. Ears, Nose, Throat: No hearing loss, sneezing, congestion, runny nose or sore throat.  SKIN: No rash or itching.  CARDIOVASCULAR: No chest pain, chest pressure or chest discomfort. No palpitations or edema.  RESPIRATORY: No shortness of breath, cough or sputum.  GASTROINTESTINAL: No anorexia, nausea, vomiting or diarrhea.  "No abdominal pain or blood.  GENITOURINARY: Negative for urgency, frequency or dysuria.   NEUROLOGICAL: chronic dizziness and vertigo + No headache, syncope, paralysis, ataxia, numbness or tingling in the extremities. No change in bowel or bladder control.  MUSCULOSKELETAL: chronic arthritis pain +   HEMATOLOGIC: anemia + No  bleeding or bruising.  LYMPHATICS: No enlarged nodes. No history of splenectomy.  PSYCHIATRIC: depression +   ENDOCRINOLOGIC: No reports of sweating, cold or heat intolerance. No polyuria or polydipsia.  ALLERGIES: No history of asthma, hives, eczema or rhinitis.          Medications:  The current medication list was reviewed in the EMR    ALLERGIES:    Allergies   Allergen Reactions   • Amoxicillin Rash   • Darvon [Propoxyphene] Rash       Objective      Vitals:    01/04/19 1241   BP: 176/79   Pulse: 66   Resp: 18   Temp: 98.1 °F (36.7 °C)   TempSrc: Temporal   SpO2: 98%   Weight: 74.1 kg (163 lb 6.4 oz)   Height: 162.6 cm (64.02\")   PainSc: 0-No pain     Current Status 1/4/2019   ECOG score 0       Physical Exam      General: Alert, awake, oriented.  Well dressed.  Not in apparent distress. Vitals as above.   HEAD: normocephalic, atraumatic.   EYES: PERRL, EOMI. Fundi normal, vision is grossly intact.  Neck: Supple, no adenopathy or thyromegaly.   Throat: normal oral cavity and pharynx. No inflammation, swelling, exudate, or lesions.  CARDIAC: Normal S1 and S2. No S3, S4 or murmurs. Rhythm is regular.Extremities are warm and well perfused.   LUNGS: Clear to auscultation and percussion without rales, rhonchi, wheezing or diminished breath sounds.  ABDOMEN: Positive bowel sounds. Soft, nondistended, nontender  . No guarding or rebound. No masses.  Back: . No bony tenderness.   EXTREMITIES: Diffuse arthritis involving multiple joints +  Peripheral pulses intact. No varicosities.  Skin: No rash or bruising.  Neurological: Grossly non-focal exam. No focal weakness. Gait: Normal.   Psych: Mood and " affect normal. No hallucination or suicidal thoughts.   Lymphatics: No cervical, axillary adenopathy.           RECENT LABS:Independently reviewed and summarized.  Hematology WBC   Date Value Ref Range Status   01/04/2019 5.94 3.20 - 9.80 10*3/mm3 Final     RBC   Date Value Ref Range Status   01/04/2019 4.82 3.77 - 5.16 10*6/mm3 Final     Hemoglobin   Date Value Ref Range Status   01/04/2019 13.6 12.0 - 15.5 g/dL Final     Hematocrit   Date Value Ref Range Status   01/04/2019 39.2 35.0 - 45.0 % Final     Platelets   Date Value Ref Range Status   01/04/2019 201 150 - 450 10*3/mm3 Final          Upper endoscopy from 7/24/18 result reviewed. Showed large hiatal hernia. Prior ulcers have healed.   Small bowel capsule endoscopy result from 5/12/17 reviewed and showed small bowel ulcers with bleeding. AVM of small bowel also noted.     Diagnosis:   (1) Iron deficiency anemia   (2) GI bleeding   (3) History of small bowel AVM, ulcers  (4) Coronary artery disease             Assessment/Plan       (1) Iron deficiency anemia:   - Improved with IV iron.   - Hg and iron studies normal.   - We will continue to monitor her CBC and iron studies every 3 months and consider IV iron as needed.       (2) GI bleeding , (3) History of small bowel AVM, ulcers: Patient with history of GI bleeding from gastric ulcer as well as small bowel AVM/ulcers. Recent upper endoscopy showed resolved/healed gastric ulcer. No further bleeding.      (4) Coronary artery disease: Stable. On aspirin. Follow up with cardiology.                      1/10/2019      CC:

## 2019-02-25 ENCOUNTER — TELEPHONE (OUTPATIENT)
Dept: ONCOLOGY | Facility: CLINIC | Age: 69
End: 2019-02-25

## 2019-02-25 NOTE — TELEPHONE ENCOUNTER
Spoke with patient. She had labs done at [facility] in [place]. Results are scanned under media dated 2/13. Please review. Is there anything in particular you want to tell patient?

## 2019-04-22 RX ORDER — MELOXICAM 15 MG/1
TABLET ORAL
Qty: 90 TABLET | Refills: 4 | Status: SHIPPED | OUTPATIENT
Start: 2019-04-22 | End: 2021-03-25

## 2019-04-30 ENCOUNTER — LAB (OUTPATIENT)
Dept: ONCOLOGY | Facility: HOSPITAL | Age: 69
End: 2019-04-30

## 2019-04-30 ENCOUNTER — OFFICE VISIT (OUTPATIENT)
Dept: ONCOLOGY | Facility: CLINIC | Age: 69
End: 2019-04-30

## 2019-04-30 ENCOUNTER — OFFICE VISIT (OUTPATIENT)
Dept: CARDIOLOGY | Facility: CLINIC | Age: 69
End: 2019-04-30

## 2019-04-30 VITALS
HEART RATE: 70 BPM | BODY MASS INDEX: 27.66 KG/M2 | HEIGHT: 64 IN | DIASTOLIC BLOOD PRESSURE: 82 MMHG | SYSTOLIC BLOOD PRESSURE: 171 MMHG | WEIGHT: 162 LBS | OXYGEN SATURATION: 98 %

## 2019-04-30 VITALS
RESPIRATION RATE: 18 BRPM | BODY MASS INDEX: 27.79 KG/M2 | WEIGHT: 162.8 LBS | HEART RATE: 67 BPM | TEMPERATURE: 97 F | OXYGEN SATURATION: 97 % | HEIGHT: 64 IN | SYSTOLIC BLOOD PRESSURE: 176 MMHG | DIASTOLIC BLOOD PRESSURE: 75 MMHG

## 2019-04-30 DIAGNOSIS — R06.00 DYSPNEA, UNSPECIFIED TYPE: ICD-10-CM

## 2019-04-30 DIAGNOSIS — D62 ACUTE BLOOD LOSS ANEMIA: ICD-10-CM

## 2019-04-30 DIAGNOSIS — D50.9 IRON DEFICIENCY ANEMIA, UNSPECIFIED IRON DEFICIENCY ANEMIA TYPE: ICD-10-CM

## 2019-04-30 DIAGNOSIS — I25.10 CORONARY ARTERY DISEASE INVOLVING NATIVE CORONARY ARTERY OF NATIVE HEART WITHOUT ANGINA PECTORIS: ICD-10-CM

## 2019-04-30 DIAGNOSIS — I10 ESSENTIAL HYPERTENSION: Primary | ICD-10-CM

## 2019-04-30 DIAGNOSIS — I34.0 NON-RHEUMATIC MITRAL REGURGITATION: ICD-10-CM

## 2019-04-30 DIAGNOSIS — E78.5 HYPERLIPIDEMIA, UNSPECIFIED HYPERLIPIDEMIA TYPE: ICD-10-CM

## 2019-04-30 LAB
BASOPHILS # BLD AUTO: 0.03 10*3/MM3 (ref 0–0.2)
BASOPHILS NFR BLD AUTO: 0.5 % (ref 0–1.5)
DEPRECATED RDW RBC AUTO: 38.3 FL (ref 37–54)
EOSINOPHIL # BLD AUTO: 0.09 10*3/MM3 (ref 0–0.4)
EOSINOPHIL NFR BLD AUTO: 1.4 % (ref 0.3–6.2)
ERYTHROCYTE [DISTWIDTH] IN BLOOD BY AUTOMATED COUNT: 12.4 % (ref 12.3–15.4)
FERRITIN SERPL-MCNC: 122.1 NG/ML (ref 13–150)
HCT VFR BLD AUTO: 35.2 % (ref 34–46.6)
HGB BLD-MCNC: 12.2 G/DL (ref 12–15.9)
IMM GRANULOCYTES # BLD AUTO: 0.02 10*3/MM3 (ref 0–0.05)
IMM GRANULOCYTES NFR BLD AUTO: 0.3 % (ref 0–0.5)
IRON 24H UR-MRATE: 103 MCG/DL (ref 37–145)
IRON SATN MFR SERPL: 32 % (ref 20–50)
LYMPHOCYTES # BLD AUTO: 1.34 10*3/MM3 (ref 0.7–3.1)
LYMPHOCYTES NFR BLD AUTO: 21.2 % (ref 19.6–45.3)
MCH RBC QN AUTO: 29.5 PG (ref 26.6–33)
MCHC RBC AUTO-ENTMCNC: 34.7 G/DL (ref 31.5–35.7)
MCV RBC AUTO: 85 FL (ref 79–97)
MONOCYTES # BLD AUTO: 0.56 10*3/MM3 (ref 0.1–0.9)
MONOCYTES NFR BLD AUTO: 8.8 % (ref 5–12)
NEUTROPHILS # BLD AUTO: 4.29 10*3/MM3 (ref 1.7–7)
NEUTROPHILS NFR BLD AUTO: 67.8 % (ref 42.7–76)
NRBC BLD AUTO-RTO: 0 /100 WBC (ref 0–0.2)
PLATELET # BLD AUTO: 219 10*3/MM3 (ref 140–450)
PMV BLD AUTO: 10 FL (ref 6–12)
RBC # BLD AUTO: 4.14 10*6/MM3 (ref 3.77–5.28)
TIBC SERPL-MCNC: 320 MCG/DL (ref 298–536)
TRANSFERRIN SERPL-MCNC: 215 MG/DL (ref 200–360)
WBC NRBC COR # BLD: 6.33 10*3/MM3 (ref 3.4–10.8)

## 2019-04-30 PROCEDURE — 84466 ASSAY OF TRANSFERRIN: CPT | Performed by: INTERNAL MEDICINE

## 2019-04-30 PROCEDURE — 83540 ASSAY OF IRON: CPT | Performed by: INTERNAL MEDICINE

## 2019-04-30 PROCEDURE — 82728 ASSAY OF FERRITIN: CPT | Performed by: INTERNAL MEDICINE

## 2019-04-30 PROCEDURE — 99214 OFFICE O/P EST MOD 30 MIN: CPT | Performed by: INTERNAL MEDICINE

## 2019-04-30 PROCEDURE — G0463 HOSPITAL OUTPT CLINIC VISIT: HCPCS | Performed by: INTERNAL MEDICINE

## 2019-04-30 PROCEDURE — 85025 COMPLETE CBC W/AUTO DIFF WBC: CPT | Performed by: INTERNAL MEDICINE

## 2019-04-30 PROCEDURE — 99213 OFFICE O/P EST LOW 20 MIN: CPT | Performed by: INTERNAL MEDICINE

## 2019-04-30 RX ORDER — AMLODIPINE BESYLATE 10 MG/1
10 TABLET ORAL DAILY
Qty: 30 TABLET | Refills: 11 | Status: SHIPPED | OUTPATIENT
Start: 2019-04-30 | End: 2021-09-29 | Stop reason: SDUPTHER

## 2019-04-30 RX ORDER — RANOLAZINE 500 MG/1
500 TABLET, EXTENDED RELEASE ORAL 2 TIMES DAILY
Qty: 90 TABLET | Refills: 3 | Status: SHIPPED | OUTPATIENT
Start: 2019-04-30 | End: 2019-12-23 | Stop reason: SDUPTHER

## 2019-04-30 RX ORDER — CARVEDILOL 3.12 MG/1
3.12 TABLET ORAL 2 TIMES DAILY
Qty: 180 TABLET | Refills: 3 | Status: SHIPPED | OUTPATIENT
Start: 2019-04-30 | End: 2019-12-23 | Stop reason: SDUPTHER

## 2019-04-30 NOTE — PROGRESS NOTES
"Fouzia To Missy Maldonado  [id number]  1950    Subjective     Ms Maldonado presented for her follow up appointment for iron deficiency anemia and blood loss anemia.   She is doing well.   No new health issues.   She is taking care of her  who has multiple health issues.   HG improved. She is feeling stronger.     History of Present Illness      This is a very pleasant 68-year-old female who was seen in consultation at the request Gian SHARP for evaluation of anemia.  Patient is here today with her daughter and lives in [place].  Patient unfortunately is a poor historian and most of the history was obtained by reviewing old medical records. She has past medical history of coronary artery disease, hypertension, hyperlipidemia, mitral bowel disease, depression with suicidal attempt, pancreatitis and anemia.  Patient tells me that she is been anemic \"all her life\".  However over a period of last couple of years her anemia has been more severe.  She tells me that she is received 2 units of blood transfusion about 1 year ago for suspected GI bleeding.  She tells me that she had an extensive GI evaluation performed in the past without any particular etiology.  However, upon review it appears that recently she was diagnosed with gastric ulcer and is possible that her bleeding is related to ulcer.  Her recent upper endoscopy showed healing of her ulcer.  In addition patient also had a capsule endoscopy performed in 2017 which showed small bowel ulcerations and AV malformation.  Patient denies any bright red blood per rectum or melena at present.  Denies any hematemesis or hemoptysis or hematuria.. She tells me that she has received IV iron infusions in the past many years ago and tells me that has tolerated it well in the past.  She tells me that she is unable to take oral iron because of the GI discomfort.  I been asked to evaluate her anemia further.      Active Ambulatory Problems     Diagnosis " Date Noted   • Pancreatitis    • Essential hypertension 01/10/2017   • Hyperlipidemia 01/10/2017   • Coronary artery disease involving native coronary artery of native heart without angina pectoris 01/10/2017   • Intercostal pain 01/10/2017   • Dyspnea 01/10/2017   • Non-rheumatic mitral regurgitation 03/20/2017   • Acute blood loss anemia 03/24/2017   • Pain of upper abdomen 03/24/2017   • Arthritis 03/24/2017   • Fever 03/25/2017   • Gastrointestinal hemorrhage associated with gastric ulcer 03/26/2017   • Iron deficiency anemia 11/02/2018     Resolved Ambulatory Problems     Diagnosis Date Noted   • No Resolved Ambulatory Problems     Past Medical History:   Diagnosis Date   • Anemia    • Arthritis    • Coronary atherosclerosis of native coronary artery    • Depression    • Hearing loss of both ears    • Hyperlipidemia    • Hypertension    • Meniere disease    • Nonrheumatic mitral valve disorder    • Pancreatitis    • Precordial pain    • Preprocedural cardiovascular examination    • Stomach ulcer    • Suicide attempt by drug ingestion (CMS/[facility]) 1998   • Transfusion history        Past Surgical History:   Procedure Laterality Date   • CARDIAC CATHETERIZATION     • ECHO - CONVERTED  03/02/2014    Normal LV systolic function with EF of 60%. Early diastolic dysfunction. Left atrium moderately dilated. Normal RV size and function. Trace mitral and mild tricuspid regurg. No intracardiac mass, pericardial effusion or cardiac thrombus seen   • ENDOSCOPY N/A 3/27/2017    Procedure: ESOPHAGOGASTRODUODENOSCOPY WITH CONTROL OF BLEED;  Surgeon: Eladio Esquivel DO;  Location: [facility] ENDOSCOPY;  Service:    • ENDOSCOPY N/A 7/24/2018    Procedure: ESOPHAGOGASTRODUODENOSCOPY;  Surgeon: Eladio Esquivel DO;  Location: [facility] ENDOSCOPY;  Service: Gastroenterology   • HYSTERECTOMY     • JOINT REPLACEMENT     • KNEE SURGERY Bilateral 2015    bilateral knee replacements   • TUBAL ABDOMINAL LIGATION  1973   • TUMOR REMOVAL Left  1970    fatty tumor removed from left breast   • TUMOR REMOVAL Right 1971    fatty tumor removal from the right shoulder       Social History     Socioeconomic History   • Marital status:      Spouse name: Not on file   • Number of children: Not on file   • Years of education: Not on file   • Highest education level: Not on file   Tobacco Use   • Smoking status: Former Smoker   • Smokeless tobacco: Never Used   Substance and Sexual Activity   • Alcohol use: No   • Drug use: No   • Sexual activity: Defer   Social History Narrative    Quit smoking 10 years ago    Lives with her     No drugs, no alcohol        Family History   Problem Relation Age of Onset   • Heart disease Mother    • Cancer Mother         colon   • Early death Mother 68        colon cancer   • Hypertension Mother    • Hyperlipidemia Mother    • Heart disease Father    • Alcohol abuse Father    • Early death Father 56        heart attack   • Hypertension Father    • Hyperlipidemia Father    • Cancer Maternal Grandmother         stomach   • Cancer Maternal Grandfather    • Cancer Paternal Grandfather         stomach         Review of Systems   CONSTITUTIONAL: fatigue +  Weakness + No weight loss, fever, chills.   HEENT: Eyes: hearing loss + No visual loss, blurred vision, double vision or yellow sclerae. Ears, Nose, Throat: No hearing loss, sneezing, congestion, runny nose or sore throat.  SKIN: No rash or itching.  CARDIOVASCULAR: No chest pain, chest pressure or chest discomfort. No palpitations or edema.  RESPIRATORY: No shortness of breath, cough or sputum.  GASTROINTESTINAL: No anorexia, nausea, vomiting or diarrhea. No abdominal pain or blood.  GENITOURINARY: Negative for urgency, frequency or dysuria.   NEUROLOGICAL: chronic dizziness and vertigo + No headache, syncope, paralysis, ataxia, numbness or tingling in the extremities. No change in bowel or bladder control.  MUSCULOSKELETAL: chronic arthritis pain +   HEMATOLOGIC: anemia  "+ No  bleeding or bruising.  LYMPHATICS: No enlarged nodes. No history of splenectomy.  PSYCHIATRIC: depression +   ENDOCRINOLOGIC: No reports of sweating, cold or heat intolerance. No polyuria or polydipsia.  ALLERGIES: No history of asthma, hives, eczema or rhinitis.          Medications:  The current medication list was reviewed in the EMR    ALLERGIES:    Allergies   Allergen Reactions   • Amoxicillin Rash   • Darvon [Propoxyphene] Rash       Objective      Vitals:    04/30/19 1351   BP: 176/75   Pulse: 67   Resp: 18   Temp: 97 °F (36.1 °C)   TempSrc: Temporal   SpO2: 97%   Weight: 73.8 kg (162 lb 12.8 oz)   Height: 162.6 cm (64.02\")   PainSc: 0-No pain     Current Status 4/30/2019   ECOG score 0       Physical Exam      General: Alert, awake, oriented.  Well dressed.  Not in apparent distress. Vitals as above.   HEAD: normocephalic, atraumatic.   EYES: PERRL, EOMI. Fundi normal, vision is grossly intact.  Neck: Supple, no adenopathy or thyromegaly.   Throat: normal oral cavity and pharynx. No inflammation, swelling, exudate, or lesions.  CARDIAC: Normal S1 and S2. No S3, S4 or murmurs. Rhythm is regular.Extremities are warm and well perfused.   LUNGS: Clear to auscultation and percussion without rales, rhonchi, wheezing or diminished breath sounds.  ABDOMEN: Positive bowel sounds. Soft, nondistended, nontender  . No guarding or rebound. No masses.  Back: . No bony tenderness.   EXTREMITIES: Diffuse arthritis involving multiple joints +  Peripheral pulses intact. No varicosities.  Skin: No rash or bruising.  Neurological: Grossly non-focal exam. No focal weakness. Gait: Normal.   Psych: Mood and affect normal. No hallucination or suicidal thoughts.   Lymphatics: No cervical, axillary adenopathy.           RECENT LABS:Independently reviewed and summarized.  Hematology WBC   Date Value Ref Range Status   04/30/2019 6.33 3.40 - 10.80 10*3/mm3 Final     RBC   Date Value Ref Range Status   04/30/2019 4.14 3.77 - 5.28 " 10*6/mm3 Final     Hemoglobin   Date Value Ref Range Status   04/30/2019 12.2 12.0 - 15.9 g/dL Final     Hematocrit   Date Value Ref Range Status   04/30/2019 35.2 34.0 - 46.6 % Final     Platelets   Date Value Ref Range Status   04/30/2019 219 140 - 450 10*3/mm3 Final          Upper endoscopy from 7/24/18 result reviewed. Showed large hiatal hernia. Prior ulcers have healed.   Small bowel capsule endoscopy result from 5/12/17 reviewed and showed small bowel ulcers with bleeding. AVM of small bowel also noted.     Diagnosis:   (1) Iron deficiency anemia   (2) GI bleeding   (3) History of small bowel AVM, ulcers  (4) Coronary artery disease             Assessment/Plan       (1) Iron deficiency anemia:   - Improved with IV iron.   - Hg and iron studies normal.   - We will continue to monitor her CBC and iron studies every 3 months and consider IV iron as needed. She is intolerance to oral iron and has developed severe GI distress secondary to oral iron in the past.       (2) GI bleeding , (3) History of small bowel AVM, ulcers: Patient with history of GI bleeding from gastric ulcer as well as small bowel AVM/ulcers. Recent upper endoscopy showed resolved/healed gastric ulcer. No further bleeding.      (4) Coronary artery disease: Stable. On aspirin. Follow up with cardiology.        Sudeep Jones MD               4/30/2019      CC:

## 2019-09-06 ENCOUNTER — OFFICE VISIT (OUTPATIENT)
Dept: CARDIOLOGY | Facility: CLINIC | Age: 69
End: 2019-09-06

## 2019-09-06 VITALS
SYSTOLIC BLOOD PRESSURE: 118 MMHG | OXYGEN SATURATION: 97 % | HEART RATE: 81 BPM | BODY MASS INDEX: 25.44 KG/M2 | DIASTOLIC BLOOD PRESSURE: 72 MMHG | WEIGHT: 149 LBS | HEIGHT: 64 IN

## 2019-09-06 DIAGNOSIS — I25.10 CORONARY ARTERY DISEASE INVOLVING NATIVE CORONARY ARTERY OF NATIVE HEART WITHOUT ANGINA PECTORIS: Primary | ICD-10-CM

## 2019-09-06 DIAGNOSIS — I10 ESSENTIAL HYPERTENSION: ICD-10-CM

## 2019-09-06 DIAGNOSIS — E78.5 HYPERLIPIDEMIA, UNSPECIFIED HYPERLIPIDEMIA TYPE: ICD-10-CM

## 2019-09-06 DIAGNOSIS — I34.0 NON-RHEUMATIC MITRAL REGURGITATION: ICD-10-CM

## 2019-09-06 PROCEDURE — 99213 OFFICE O/P EST LOW 20 MIN: CPT | Performed by: INTERNAL MEDICINE

## 2019-09-06 NOTE — PROGRESS NOTES
Fouzia To Missy Maldonado  69 y.o. female    09/06/2019  1. Coronary artery disease involving native coronary artery of native heart without angina pectoris    2. Hyperlipidemia, unspecified hyperlipidemia type    3. Essential hypertension    4. Non-rheumatic mitral regurgitation        History of Present Illness:    .Patient's Body mass index is 25.58 kg/m². BMI is within normal parameters. No follow-up required..      69 years old patient under a lot of social stress as her  suffered a second stroke and has to take care of and he recently passed away and she is recovering from grief him fluctuating blood pressure with history of hypertension hypertensive heart disease mild-to-moderate regurgitation , nonobstructive coronary to disease underwent  stress test reported normal left  systolic function no evidence of ischemia.  The patient had history of pancreatitis complicated with GI bleeding.  She continued sick in the stomach with symptom of nauseous / dyspeptic symptom and and discomfort in the epigastric area.  She ran out Ranexa and Cipro sample given to her.  Her blood pressure noted in the higher side she stopped taking Vasotec on her own.  We'll restart back on.   She denies any typical chest pain.  Denies any orthopnea PND or become short-winded with a moderate level of physical activity possibility of physical deconditioning cannot be excluded.     ECHO 1/2017  · Mild-to-moderate mitral valve regurgitation is present  · Left ventricular wall thickness is consistent with mild concentric hypertrophy.  · Mild pulmonic valve regurgitation is present.  · Estimated EF = 66%.  · Left atrial cavity size is mild-to-moderately dilated.        2017  · Myocardial perfusion imaging indicates a normal myocardial perfusion study with no evidence of ischemia.  · Impressions are consistent with a low risk study.  · Left ventricular ejection fraction is normal (Calculated EF = 61%  SUBJECTIVE:    Allergies    Allergen Reactions   • Amoxicillin Rash   • Darvon [Propoxyphene] Rash         Past Medical History:   Diagnosis Date   • Anemia    • Arthritis    • Coronary atherosclerosis of native coronary artery    • Depression    • Hearing loss of both ears     can hear nothing out of left ear and has right ear loss as well   • Hyperlipidemia    • Hypertension    • Meniere disease    • Nonrheumatic mitral valve disorder    • Pancreatitis    • Precordial pain    • Preprocedural cardiovascular examination    • Stomach ulcer    • Suicide attempt by drug ingestion (CMS/HCC) 1998   • Transfusion history          Past Surgical History:   Procedure Laterality Date   • CARDIAC CATHETERIZATION     • ECHO - CONVERTED  03/02/2014    Normal LV systolic function with EF of 60%. Early diastolic dysfunction. Left atrium moderately dilated. Normal RV size and function. Trace mitral and mild tricuspid regurg. No intracardiac mass, pericardial effusion or cardiac thrombus seen   • ENDOSCOPY N/A 3/27/2017    Procedure: ESOPHAGOGASTRODUODENOSCOPY WITH CONTROL OF BLEED;  Surgeon: Eladio Esquivel DO;  Location: [facility] ENDOSCOPY;  Service:    • ENDOSCOPY N/A 7/24/2018    Procedure: ESOPHAGOGASTRODUODENOSCOPY;  Surgeon: Eladio Esquivel DO;  Location: [facility] ENDOSCOPY;  Service: Gastroenterology   • HYSTERECTOMY     • JOINT REPLACEMENT     • KNEE SURGERY Bilateral 2015    bilateral knee replacements   • TUBAL ABDOMINAL LIGATION  1973   • TUMOR REMOVAL Left 1970    fatty tumor removed from left breast   • TUMOR REMOVAL Right 1971    fatty tumor removal from the right shoulder         Family History   Problem Relation Age of Onset   • Heart disease Mother    • Cancer Mother         colon   • Early death Mother 68        colon cancer   • Hypertension Mother    • Hyperlipidemia Mother    • Heart disease Father    • Alcohol abuse Father    • Early death Father 56        heart attack   • Hypertension Father    • Hyperlipidemia Father    • Cancer  Maternal Grandmother         stomach   • Cancer Maternal Grandfather    • Cancer Paternal Grandfather         stomach         Social History     Socioeconomic History   • Marital status:      Spouse name: Not on file   • Number of children: Not on file   • Years of education: Not on file   • Highest education level: Not on file   Tobacco Use   • Smoking status: Former Smoker   • Smokeless tobacco: Never Used   Substance and Sexual Activity   • Alcohol use: No   • Drug use: No   • Sexual activity: Defer   Social History Narrative    Quit smoking 10 years ago    Lives with her     No drugs, no alcohol          Current Outpatient Medications   Medication Sig Dispense Refill   • amLODIPine (NORVASC) 10 MG tablet Take 1 tablet by mouth Daily. 30 tablet 11   • amLODIPine (NORVASC) 5 MG tablet Take 5 mg by mouth Daily.     • aspirin 81 MG chewable tablet Chew 81 mg Daily.     • carvedilol (COREG) 3.125 MG tablet Take 1 tablet by mouth 2 (Two) Times a Day. 180 tablet 3   • enalapril (VASOTEC) 5 MG tablet TAKE 1 TABLET EVERY DAY 90 tablet 5   • FLUoxetine (PROzac) 20 MG capsule Take 20 mg by mouth Daily.     • gabapentin (NEURONTIN) 300 MG capsule Take 300 mg by mouth 2 (Two) Times a Day. 0.5 tab in am / 1 at night     • gemfibrozil (LOPID) 600 MG tablet Take 600 mg by mouth 2 (Two) Times a Day Before Meals.     • hydrochlorothiazide (MICROZIDE) 12.5 MG capsule Take 12.5 mg by mouth Daily.     • Magnesium Hydroxide (MAGNESIA PO) Take 250 mg by mouth Daily.     • meloxicam (MOBIC) 15 MG tablet TAKE 1 TABLET EVERY DAY WITH A MEAL 90 tablet 4   • pantoprazole (PROTONIX) 40 MG EC tablet Take 40 mg by mouth 2 (Two) Times a Day.     • Potassium 99 MG tablet Take 1 tablet by mouth Every Night.     • pravastatin (PRAVACHOL) 40 MG tablet Take 40 mg by mouth Daily.     • ranolazine (RANEXA) 500 MG 12 hr tablet Take 1 tablet by mouth 2 (Two) Times a Day. 90 tablet 3   • sucralfate (CARAFATE) 1 g tablet Take 1 g by mouth 4  "(Four) Times a Day.     • temazepam (RESTORIL) 15 MG capsule      • vitamin B-12 (CYANOCOBALAMIN) 1000 MCG tablet Take 1,000 mcg by mouth Daily.     • vitamin D (ERGOCALCIFEROL) 10112 UNITS capsule capsule Take 50,000 Units by mouth 1 (One) Time Per Week.       No current facility-administered medications for this visit.            Review of Systems:     Constitutional:  Denies recent weight loss, weight gain, fever or chills, no change in exercise tolerance.     HENT:  Denies any hearing loss, epistaxis, hoarseness, or difficulty speaking.     Eyes: Wears eyeglasses or contact lenses.    Respiratory:  Denies dyspnea with exertion,no cough, wheezing, or hemoptysis.     Cardiovascular: See H&P    Gastrointestinal:  Denies change in bowel habits, dyspepsia, ulcer disease, hematochezia, or melena.     Endocrine: Negative for cold intolerance, heat intolerance, polydipsia, polyphagia and polyuria. Denies any history of weight change, polydipsia, polyuria.     Genitourinary: Negative.      Musculoskeletal: Denies any history of arthritic symptoms or back problems.     Skin:  Denies any change in hair or nails, rashes, or skin lesions.     Allergic/Immunologic: Negative.  Negative for environmental allergies, food allergies and immunocompromised state.     Neurological:  Denies any history of recurrent headaches, strokes, TIA, or seizure disorder.     Hematological: Denies any food allergies, seasonal allergies, bleeding disorders, or lymphadenopathy.     Psychiatric/Behavioral: Denies any history of depression, substance abuse, or change in cognitive function.       OBJECTIVE:    /72   Pulse 81   Ht 162.6 cm (64\")   Wt 67.6 kg (149 lb)   SpO2 97%   BMI 25.58 kg/m²       Physical Exam:     Constitutional: Cooperative, alert and oriented, well-developed, well-nourished, in no acute distress.     HENT:   Head: Normocephalic, normal hair patterns, no masses or tenderness.  Ears, Nose, and Throat: No gross " abnormalities. No pallor or cyanosis. Dentition good.   Eyes: EOMS intact, PERRL, conjunctivae and lids unremarkable. Fundoscopic exam and visual fields not performed.   Neck: No palpable masses or adenopathy, no thyromegaly, no JVD, carotid pulses are full and equal bilaterally and without  Bruits.     Cardiovascular: Regular rhythm, S1 and S2 normal, no S3 or S4. Apical impulse not displaced. No murmurs, gallops, or rubs detected.     Pulmonary/Chest: Chest: normal symmetry, no tenderness to palpation, normal respiratory excursion, no intercostal retraction, no use of accessory muscles. Pulmonary: Normal breath sounds. No rales or rhonchi.    Abdominal: Abdomen soft, bowel sounds normoactive, no masses, no hepatosplenomegaly, non-tender, no bruits.     Musculoskeletal: No deformities, clubbing, cyanosis, erythema, or edema observed. There are no spinal abnormalities noted. Normal muscle strength and tone. Pulses full and equal in all extremities, no bruits auscultated.     Neurological: No gross motor or sensory deficits noted, affect appropriate, oriented to time, person, place.     Skin: Warm and dry to the touch, no apparent skin lesions or masses noted.     Psychiatric: She has a normal mood and affect. Her behavior is normal. Judgment and thought content normal.         Procedures      Lab Results   Component Value Date    WBC 6.33 04/30/2019    HGB 12.2 04/30/2019    HCT 35.2 04/30/2019    MCV 85.0 04/30/2019     04/30/2019     Lab Results   Component Value Date    GLUCOSE 97 03/28/2017    BUN 12 03/28/2017    CREATININE 0.92 03/28/2017    EGFRIFNONA 61 03/28/2017    BCR 13.0 03/28/2017    CO2 24.0 03/28/2017    CALCIUM 8.7 03/28/2017    ALBUMIN 3.70 03/28/2017    AST 20 03/28/2017    ALT 21 03/28/2017     No results found for: CHOL  No results found for: TRIG  No results found for: HDL  No components found for: LDLCALC  No results found for: LDL  No results found for: HDLLDLRATIO  No components found  for: CHOLHDL  No results found for: HGBA1C  Lab Results   Component Value Date    TSH 2.63 2014           ASSESSMENT AND PLAN:    #1 history of nonobstructive coronary to disease within normal recent stress test #2 hypertension with hypertensive heart disease #3 mild to moderate mitral regurgitation.  #4 dyspnea on exertion may be multifactorial needed in etiology.  #5 history of for dyspepsia/gastritis with previous history of pancreatitis complicated with GI bleed     Clinically there wasn't enough for any acute cardiac decompensation based on the clinical history physical finding.  The shortness breath and admitted maybe multifactorial the possibility of physical deconditioning cannot be excluded.   complaint in the sickness in the stomach/nauseous feeling and at present being treated with Carafate and Prilosec.  She is a resident of [facility] hypertension being managed with amlodipine, hydrochlorothiazide.    Increase the dose of amlodipine to 10 mg and will add Coreg starting a dose of 3.125 mg twice a day prescription refill sent to the pharmacy The patient is under a lot of stress as her  suffered a massive stroke affecting her quality of life.  Her  recently  and she is recovering from grief.  I spent more than 30 minutes with the patient.  Her blood pressure is much better and no typical chest pain reported.  We will see her back in 4 months undependable patient clinical conditions.  Fouzia was seen today for follow-up.    Diagnoses and all orders for this visit:    Coronary artery disease involving native coronary artery of native heart without angina pectoris    Hyperlipidemia, unspecified hyperlipidemia type    Essential hypertension    Non-rheumatic mitral regurgitation        Robbin Matthews MD  2019  10:17 AM

## 2019-09-16 ENCOUNTER — LAB (OUTPATIENT)
Dept: ONCOLOGY | Facility: HOSPITAL | Age: 69
End: 2019-09-16

## 2019-09-16 ENCOUNTER — OFFICE VISIT (OUTPATIENT)
Dept: ONCOLOGY | Facility: CLINIC | Age: 69
End: 2019-09-16

## 2019-09-16 VITALS
HEART RATE: 68 BPM | HEIGHT: 64 IN | BODY MASS INDEX: 26.29 KG/M2 | TEMPERATURE: 97 F | SYSTOLIC BLOOD PRESSURE: 119 MMHG | DIASTOLIC BLOOD PRESSURE: 66 MMHG | OXYGEN SATURATION: 97 % | WEIGHT: 154 LBS | RESPIRATION RATE: 18 BRPM

## 2019-09-16 DIAGNOSIS — D62 ACUTE BLOOD LOSS ANEMIA: ICD-10-CM

## 2019-09-16 DIAGNOSIS — D50.9 IRON DEFICIENCY ANEMIA, UNSPECIFIED IRON DEFICIENCY ANEMIA TYPE: Primary | ICD-10-CM

## 2019-09-16 DIAGNOSIS — I25.10 CORONARY ARTERY DISEASE INVOLVING NATIVE CORONARY ARTERY OF NATIVE HEART WITHOUT ANGINA PECTORIS: ICD-10-CM

## 2019-09-16 DIAGNOSIS — F43.21 GRIEF: ICD-10-CM

## 2019-09-16 DIAGNOSIS — D50.9 IRON DEFICIENCY ANEMIA, UNSPECIFIED IRON DEFICIENCY ANEMIA TYPE: ICD-10-CM

## 2019-09-16 DIAGNOSIS — K25.4 GASTROINTESTINAL HEMORRHAGE ASSOCIATED WITH GASTRIC ULCER: ICD-10-CM

## 2019-09-16 LAB
BASOPHILS # BLD AUTO: 0.03 10*3/MM3 (ref 0–0.2)
BASOPHILS NFR BLD AUTO: 0.3 % (ref 0–1.5)
DEPRECATED RDW RBC AUTO: 39.5 FL (ref 37–54)
EOSINOPHIL # BLD AUTO: 0.2 10*3/MM3 (ref 0–0.4)
EOSINOPHIL NFR BLD AUTO: 2.1 % (ref 0.3–6.2)
ERYTHROCYTE [DISTWIDTH] IN BLOOD BY AUTOMATED COUNT: 12.7 % (ref 12.3–15.4)
FERRITIN SERPL-MCNC: 108.4 NG/ML (ref 13–150)
HCT VFR BLD AUTO: 30.3 % (ref 34–46.6)
HGB BLD-MCNC: 10.2 G/DL (ref 12–15.9)
IMM GRANULOCYTES # BLD AUTO: 0.11 10*3/MM3 (ref 0–0.05)
IMM GRANULOCYTES NFR BLD AUTO: 1.1 % (ref 0–0.5)
IRON 24H UR-MRATE: 77 MCG/DL (ref 37–145)
IRON SATN MFR SERPL: 24 % (ref 20–50)
LYMPHOCYTES # BLD AUTO: 2 10*3/MM3 (ref 0.7–3.1)
LYMPHOCYTES NFR BLD AUTO: 20.6 % (ref 19.6–45.3)
MCH RBC QN AUTO: 29 PG (ref 26.6–33)
MCHC RBC AUTO-ENTMCNC: 33.7 G/DL (ref 31.5–35.7)
MCV RBC AUTO: 86.1 FL (ref 79–97)
MONOCYTES # BLD AUTO: 0.76 10*3/MM3 (ref 0.1–0.9)
MONOCYTES NFR BLD AUTO: 7.8 % (ref 5–12)
NEUTROPHILS # BLD AUTO: 6.6 10*3/MM3 (ref 1.7–7)
NEUTROPHILS NFR BLD AUTO: 68.1 % (ref 42.7–76)
NRBC BLD AUTO-RTO: 0 /100 WBC (ref 0–0.2)
PLATELET # BLD AUTO: 307 10*3/MM3 (ref 140–450)
PMV BLD AUTO: 10.2 FL (ref 6–12)
RBC # BLD AUTO: 3.52 10*6/MM3 (ref 3.77–5.28)
TIBC SERPL-MCNC: 323 MCG/DL (ref 298–536)
TRANSFERRIN SERPL-MCNC: 217 MG/DL (ref 200–360)
WBC NRBC COR # BLD: 9.7 10*3/MM3 (ref 3.4–10.8)

## 2019-09-16 PROCEDURE — 84466 ASSAY OF TRANSFERRIN: CPT | Performed by: INTERNAL MEDICINE

## 2019-09-16 PROCEDURE — 85025 COMPLETE CBC W/AUTO DIFF WBC: CPT | Performed by: INTERNAL MEDICINE

## 2019-09-16 PROCEDURE — 82728 ASSAY OF FERRITIN: CPT | Performed by: INTERNAL MEDICINE

## 2019-09-16 PROCEDURE — 83540 ASSAY OF IRON: CPT | Performed by: INTERNAL MEDICINE

## 2019-09-16 PROCEDURE — G0463 HOSPITAL OUTPT CLINIC VISIT: HCPCS | Performed by: INTERNAL MEDICINE

## 2019-09-16 PROCEDURE — 99213 OFFICE O/P EST LOW 20 MIN: CPT | Performed by: INTERNAL MEDICINE

## 2019-09-17 ENCOUNTER — TELEPHONE (OUTPATIENT)
Dept: ONCOLOGY | Facility: HOSPITAL | Age: 69
End: 2019-09-17

## 2019-09-17 NOTE — TELEPHONE ENCOUNTER
----- Message from Shukri Jones MD sent at 9/16/2019 11:55 PM CDT -----  Iron level is normal.   Re-check in 2 weeks.

## 2019-09-17 NOTE — PROGRESS NOTES
"Fouzia To Missy Maldonado  [id number]  1950    Subjective     Ms Maldonado presented for her follow up appointment for iron deficiency anemia and blood loss anemia.   Her  of 55 years passed away month ago.   She is dealing with grief of family member.   Labs reviewed. Her anemia is worse today   She denies any bleeding.   We reviewed the result of labs, which showed normal iron studies.   We check her labs in 2 weeks.   ROS as below.        History of Present Illness      This is a very pleasant 68-year-old female who was seen in consultation at the request Gian SHARP for evaluation of anemia.  Patient is here today with her daughter and lives in [place].  Patient unfortunately is a poor historian and most of the history was obtained by reviewing old medical records. She has past medical history of coronary artery disease, hypertension, hyperlipidemia, mitral bowel disease, depression with suicidal attempt, pancreatitis and anemia.  Patient tells me that she is been anemic \"all her life\".  However over a period of last couple of years her anemia has been more severe.  She tells me that she is received 2 units of blood transfusion about 1 year ago for suspected GI bleeding.  She tells me that she had an extensive GI evaluation performed in the past without any particular etiology.  However, upon review it appears that recently she was diagnosed with gastric ulcer and is possible that her bleeding is related to ulcer.  Her recent upper endoscopy showed healing of her ulcer.  In addition patient also had a capsule endoscopy performed in 2017 which showed small bowel ulcerations and AV malformation.  Patient denies any bright red blood per rectum or melena at present.  Denies any hematemesis or hemoptysis or hematuria.. She tells me that she has received IV iron infusions in the past many years ago and tells me that has tolerated it well in the past.  She tells me that she is unable to take oral " iron because of the GI discomfort.  I been asked to evaluate her anemia further.      Active Ambulatory Problems     Diagnosis Date Noted   • Pancreatitis    • Essential hypertension 01/10/2017   • Hyperlipidemia 01/10/2017   • Coronary artery disease involving native coronary artery of native heart without angina pectoris 01/10/2017   • Intercostal pain 01/10/2017   • Dyspnea 01/10/2017   • Non-rheumatic mitral regurgitation 03/20/2017   • Acute blood loss anemia 03/24/2017   • Pain of upper abdomen 03/24/2017   • Arthritis 03/24/2017   • Fever 03/25/2017   • Gastrointestinal hemorrhage associated with gastric ulcer 03/26/2017   • Iron deficiency anemia 11/02/2018     Resolved Ambulatory Problems     Diagnosis Date Noted   • No Resolved Ambulatory Problems     Past Medical History:   Diagnosis Date   • Anemia    • Arthritis    • Coronary atherosclerosis of native coronary artery    • Depression    • Hearing loss of both ears    • Hyperlipidemia    • Hypertension    • Meniere disease    • Nonrheumatic mitral valve disorder    • Pancreatitis    • Precordial pain    • Preprocedural cardiovascular examination    • Stomach ulcer    • Suicide attempt by drug ingestion (CMS/[facility]) 1998   • Transfusion history        Past Surgical History:   Procedure Laterality Date   • CARDIAC CATHETERIZATION     • ECHO - CONVERTED  03/02/2014    Normal LV systolic function with EF of 60%. Early diastolic dysfunction. Left atrium moderately dilated. Normal RV size and function. Trace mitral and mild tricuspid regurg. No intracardiac mass, pericardial effusion or cardiac thrombus seen   • ENDOSCOPY N/A 3/27/2017    Procedure: ESOPHAGOGASTRODUODENOSCOPY WITH CONTROL OF BLEED;  Surgeon: Eladio Esquivel DO;  Location: [facility] ENDOSCOPY;  Service:    • ENDOSCOPY N/A 7/24/2018    Procedure: ESOPHAGOGASTRODUODENOSCOPY;  Surgeon: Eladio Esquivel DO;  Location: [facility] ENDOSCOPY;  Service: Gastroenterology   • HYSTERECTOMY     • JOINT REPLACEMENT      • KNEE SURGERY Bilateral 2015    bilateral knee replacements   • TUBAL ABDOMINAL LIGATION  1973   • TUMOR REMOVAL Left 1970    fatty tumor removed from left breast   • TUMOR REMOVAL Right 1971    fatty tumor removal from the right shoulder       Social History     Socioeconomic History   • Marital status:      Spouse name: Not on file   • Number of children: Not on file   • Years of education: Not on file   • Highest education level: Not on file   Tobacco Use   • Smoking status: Former Smoker   • Smokeless tobacco: Never Used   Substance and Sexual Activity   • Alcohol use: No   • Drug use: No   • Sexual activity: Defer   Social History Narrative    Quit smoking 10 years ago    Lives with her     No drugs, no alcohol        Family History   Problem Relation Age of Onset   • Heart disease Mother    • Cancer Mother         colon   • Early death Mother 68        colon cancer   • Hypertension Mother    • Hyperlipidemia Mother    • Heart disease Father    • Alcohol abuse Father    • Early death Father 56        heart attack   • Hypertension Father    • Hyperlipidemia Father    • Cancer Maternal Grandmother         stomach   • Cancer Maternal Grandfather    • Cancer Paternal Grandfather         stomach         Review of Systems   CONSTITUTIONAL: fatigue +  Weakness + No weight loss, fever, chills.   HEENT: Eyes: hearing loss + No visual loss, blurred vision, double vision or yellow sclerae. Ears, Nose, Throat: No hearing loss, sneezing, congestion, runny nose or sore throat.  SKIN: No rash or itching.  CARDIOVASCULAR: No chest pain, chest pressure or chest discomfort. No palpitations or edema.  RESPIRATORY: No shortness of breath, cough or sputum.  GASTROINTESTINAL: No anorexia, nausea, vomiting or diarrhea. No abdominal pain or blood.  GENITOURINARY: Negative for urgency, frequency or dysuria.   NEUROLOGICAL: chronic dizziness and vertigo + No headache, syncope, paralysis, ataxia, numbness or tingling  "in the extremities. No change in bowel or bladder control.  MUSCULOSKELETAL: chronic arthritis pain +   HEMATOLOGIC: anemia + No  bleeding or bruising.  LYMPHATICS: No enlarged nodes. No history of splenectomy.  PSYCHIATRIC: depression +   ENDOCRINOLOGIC: No reports of sweating, cold or heat intolerance. No polyuria or polydipsia.  ALLERGIES: No history of asthma, hives, eczema or rhinitis.          Medications:  The current medication list was reviewed in the EMR    ALLERGIES:    Allergies   Allergen Reactions   • Amoxicillin Rash   • Darvon [Propoxyphene] Rash       Objective      Vitals:    09/16/19 1338   BP: 119/66   Pulse: 68   Resp: 18   Temp: 97 °F (36.1 °C)   TempSrc: Temporal   SpO2: 97%   Weight: 69.9 kg (154 lb)   Height: 162.6 cm (64.02\")   PainSc: 0-No pain     Current Status 9/16/2019   ECOG score 0       Physical Exam      General: Alert, awake, oriented.  Well dressed.  Not in apparent distress. Vitals as above.   HEAD: normocephalic, atraumatic.   EYES: PERRL, EOMI. Fundi normal, vision is grossly intact.  Neck: Supple, no adenopathy or thyromegaly.   Throat: normal oral cavity and pharynx. No inflammation, swelling, exudate, or lesions.  CARDIAC: Normal S1 and S2. No S3, S4 or murmurs. Rhythm is regular.Extremities are warm and well perfused.   LUNGS: Clear to auscultation and percussion without rales, rhonchi, wheezing or diminished breath sounds.  ABDOMEN: Positive bowel sounds. Soft, nondistended, nontender  . No guarding or rebound. No masses.  Back: . No bony tenderness.   EXTREMITIES: Diffuse arthritis involving multiple joints +  Peripheral pulses intact. No varicosities.  Skin: No rash or bruising.  Neurological: Grossly non-focal exam. No focal weakness. Gait: Normal.   Psych: Mood and affect normal. No hallucination or suicidal thoughts.   Lymphatics: No cervical, axillary adenopathy.           RECENT LABS:Independently reviewed and summarized.  Hematology WBC   Date Value Ref Range " Status   09/16/2019 9.70 3.40 - 10.80 10*3/mm3 Final     RBC   Date Value Ref Range Status   09/16/2019 3.52 (L) 3.77 - 5.28 10*6/mm3 Final     Hemoglobin   Date Value Ref Range Status   09/16/2019 10.2 (L) 12.0 - 15.9 g/dL Final     Hematocrit   Date Value Ref Range Status   09/16/2019 30.3 (L) 34.0 - 46.6 % Final     Platelets   Date Value Ref Range Status   09/16/2019 307 140 - 450 10*3/mm3 Final          Upper endoscopy from 7/24/18 result reviewed. Showed large hiatal hernia. Prior ulcers have healed.   Small bowel capsule endoscopy result from 5/12/17 reviewed and showed small bowel ulcers with bleeding. AVM of small bowel also noted.     Diagnosis:   (1) Iron deficiency anemia   (2) GI bleeding   (3) History of small bowel AVM, ulcers  (4) Coronary artery disease   (5) Grief     Assessment/Plan       (1) Iron deficiency anemia:   - Her anemia is worse today.   - Hg 10.2.   - She denies any bleeding.   - Iron level is normal .   - We will re-check her CBC and iron profile in 2 weeks.       (2) GI bleeding , (3) History of small bowel AVM, ulcers: Patient with history of GI bleeding from gastric ulcer as well as small bowel AVM/ulcers. Recent upper endoscopy showed resolved/healed gastric ulcer. No further bleeding.      (4) Coronary artery disease: Stable. On aspirin. Follow up with cardiology.    (5) Grief: Normal grief reaction to death of of . Emotional support provided.     Sudeep Jones MD               9/16/2019      CC:

## 2019-09-18 NOTE — TELEPHONE ENCOUNTER
Called patient and informed. She states understanding and says she will go to [facility] in [place] to have labs drawn. Orders faxed to [facility].

## 2019-10-25 ENCOUNTER — TELEPHONE (OUTPATIENT)
Dept: ONCOLOGY | Facility: HOSPITAL | Age: 69
End: 2019-10-25

## 2019-10-25 NOTE — TELEPHONE ENCOUNTER
----- Message from Shukri Jones MD sent at 10/24/2019  3:51 PM CDT -----  Let patient know I reviewed her labs and iron studies. They are in normal range.

## 2019-12-16 ENCOUNTER — TELEPHONE (OUTPATIENT)
Dept: ONCOLOGY | Facility: HOSPITAL | Age: 69
End: 2019-12-16

## 2019-12-16 ENCOUNTER — LAB (OUTPATIENT)
Dept: ONCOLOGY | Facility: HOSPITAL | Age: 69
End: 2019-12-16

## 2019-12-16 ENCOUNTER — OFFICE VISIT (OUTPATIENT)
Dept: ONCOLOGY | Facility: CLINIC | Age: 69
End: 2019-12-16

## 2019-12-16 VITALS
BODY MASS INDEX: 26.4 KG/M2 | DIASTOLIC BLOOD PRESSURE: 86 MMHG | HEIGHT: 64 IN | SYSTOLIC BLOOD PRESSURE: 180 MMHG | HEART RATE: 63 BPM | RESPIRATION RATE: 18 BRPM | WEIGHT: 154.6 LBS | TEMPERATURE: 97.8 F

## 2019-12-16 DIAGNOSIS — D50.9 IRON DEFICIENCY ANEMIA, UNSPECIFIED IRON DEFICIENCY ANEMIA TYPE: ICD-10-CM

## 2019-12-16 DIAGNOSIS — I25.10 CORONARY ARTERY DISEASE INVOLVING NATIVE CORONARY ARTERY OF NATIVE HEART WITHOUT ANGINA PECTORIS: ICD-10-CM

## 2019-12-16 DIAGNOSIS — D50.9 IRON DEFICIENCY ANEMIA, UNSPECIFIED IRON DEFICIENCY ANEMIA TYPE: Primary | ICD-10-CM

## 2019-12-16 DIAGNOSIS — D62 ACUTE BLOOD LOSS ANEMIA: ICD-10-CM

## 2019-12-16 DIAGNOSIS — K25.4 GASTROINTESTINAL HEMORRHAGE ASSOCIATED WITH GASTRIC ULCER: ICD-10-CM

## 2019-12-16 LAB
BASOPHILS # BLD AUTO: 0.02 10*3/MM3 (ref 0–0.2)
BASOPHILS NFR BLD AUTO: 0.3 % (ref 0–1.5)
DEPRECATED RDW RBC AUTO: 40.2 FL (ref 37–54)
EOSINOPHIL # BLD AUTO: 0.17 10*3/MM3 (ref 0–0.4)
EOSINOPHIL NFR BLD AUTO: 2.7 % (ref 0.3–6.2)
ERYTHROCYTE [DISTWIDTH] IN BLOOD BY AUTOMATED COUNT: 12.9 % (ref 12.3–15.4)
FERRITIN SERPL-MCNC: 32.66 NG/ML (ref 13–150)
HCT VFR BLD AUTO: 36.4 % (ref 34–46.6)
HGB BLD-MCNC: 12 G/DL (ref 12–15.9)
IMM GRANULOCYTES # BLD AUTO: 0.03 10*3/MM3 (ref 0–0.05)
IMM GRANULOCYTES NFR BLD AUTO: 0.5 % (ref 0–0.5)
IRON 24H UR-MRATE: 59 MCG/DL (ref 37–145)
IRON SATN MFR SERPL: 17 % (ref 20–50)
LYMPHOCYTES # BLD AUTO: 1.56 10*3/MM3 (ref 0.7–3.1)
LYMPHOCYTES NFR BLD AUTO: 24.7 % (ref 19.6–45.3)
MCH RBC QN AUTO: 28.5 PG (ref 26.6–33)
MCHC RBC AUTO-ENTMCNC: 33 G/DL (ref 31.5–35.7)
MCV RBC AUTO: 86.5 FL (ref 79–97)
MONOCYTES # BLD AUTO: 0.69 10*3/MM3 (ref 0.1–0.9)
MONOCYTES NFR BLD AUTO: 10.9 % (ref 5–12)
NEUTROPHILS # BLD AUTO: 3.85 10*3/MM3 (ref 1.7–7)
NEUTROPHILS NFR BLD AUTO: 60.9 % (ref 42.7–76)
NRBC BLD AUTO-RTO: 0 /100 WBC (ref 0–0.2)
PLATELET # BLD AUTO: 217 10*3/MM3 (ref 140–450)
PMV BLD AUTO: 10.5 FL (ref 6–12)
RBC # BLD AUTO: 4.21 10*6/MM3 (ref 3.77–5.28)
TIBC SERPL-MCNC: 349 MCG/DL (ref 298–536)
TRANSFERRIN SERPL-MCNC: 234 MG/DL (ref 200–360)
WBC NRBC COR # BLD: 6.32 10*3/MM3 (ref 3.4–10.8)

## 2019-12-16 PROCEDURE — G0463 HOSPITAL OUTPT CLINIC VISIT: HCPCS | Performed by: INTERNAL MEDICINE

## 2019-12-16 PROCEDURE — 99213 OFFICE O/P EST LOW 20 MIN: CPT | Performed by: INTERNAL MEDICINE

## 2019-12-16 PROCEDURE — 82728 ASSAY OF FERRITIN: CPT | Performed by: INTERNAL MEDICINE

## 2019-12-16 PROCEDURE — 84466 ASSAY OF TRANSFERRIN: CPT | Performed by: INTERNAL MEDICINE

## 2019-12-16 PROCEDURE — 83540 ASSAY OF IRON: CPT | Performed by: INTERNAL MEDICINE

## 2019-12-16 PROCEDURE — 85025 COMPLETE CBC W/AUTO DIFF WBC: CPT | Performed by: INTERNAL MEDICINE

## 2019-12-16 RX ORDER — SODIUM CHLORIDE 9 MG/ML
250 INJECTION, SOLUTION INTRAVENOUS ONCE
Status: CANCELLED | OUTPATIENT
Start: 2019-12-26

## 2019-12-16 RX ORDER — METHYLPREDNISOLONE SODIUM SUCCINATE 125 MG/2ML
125 INJECTION, POWDER, LYOPHILIZED, FOR SOLUTION INTRAMUSCULAR; INTRAVENOUS AS NEEDED
Status: CANCELLED | OUTPATIENT
Start: 2019-12-26

## 2019-12-16 RX ORDER — DIPHENHYDRAMINE HYDROCHLORIDE 50 MG/ML
50 INJECTION INTRAMUSCULAR; INTRAVENOUS AS NEEDED
Status: CANCELLED | OUTPATIENT
Start: 2019-12-26

## 2019-12-16 NOTE — PROGRESS NOTES
"Fouzia To Missy Maldonado  [id number]  1950    Subjective     Ms Maldonado presented for her follow up appointment for iron deficiency anemia and blood loss anemia.   She continues to deal with a lot of social stressors.  Her  passed away earlier this year.  In addition her daughter has moved in with her grandchildren with her.  She continues to struggle with arthritis and pain in her back.  Labs reviewed.  Her hemoglobin is stable however her ferritin and iron saturation is low.   She denies any bleeding.   We will arrange for single Injectafer infusion next week.  She is unable to tolerate oral iron due to GI side effects.    Discussed mammogram screening for breast cancer.  She has not had mammogram in 3 years.  She wants to defer this at moment as she is dealing with a lot of family and other health issues.  ROS as below.        History of Present Illness      This is a very pleasant 68-year-old female who was seen in consultation at the request Gian SHARP for evaluation of anemia.  Patient is here today with her daughter and lives in [place].  Patient unfortunately is a poor historian and most of the history was obtained by reviewing old medical records. She has past medical history of coronary artery disease, hypertension, hyperlipidemia, mitral bowel disease, depression with suicidal attempt, pancreatitis and anemia.  Patient tells me that she is been anemic \"all her life\".  However over a period of last couple of years her anemia has been more severe.  She tells me that she is received 2 units of blood transfusion about 1 year ago for suspected GI bleeding.  She tells me that she had an extensive GI evaluation performed in the past without any particular etiology.  However, upon review it appears that recently she was diagnosed with gastric ulcer and is possible that her bleeding is related to ulcer.  Her recent upper endoscopy showed healing of her ulcer.  In addition patient also " had a capsule endoscopy performed in 2017 which showed small bowel ulcerations and AV malformation.  Patient denies any bright red blood per rectum or melena at present.  Denies any hematemesis or hemoptysis or hematuria.. She tells me that she has received IV iron infusions in the past many years ago and tells me that has tolerated it well in the past.  She tells me that she is unable to take oral iron because of the GI discomfort.  I been asked to evaluate her anemia further.      Active Ambulatory Problems     Diagnosis Date Noted   • Pancreatitis    • Essential hypertension 01/10/2017   • Hyperlipidemia 01/10/2017   • Coronary artery disease involving native coronary artery of native heart without angina pectoris 01/10/2017   • Intercostal pain 01/10/2017   • Dyspnea 01/10/2017   • Non-rheumatic mitral regurgitation 03/20/2017   • Acute blood loss anemia 03/24/2017   • Pain of upper abdomen 03/24/2017   • Arthritis 03/24/2017   • Fever 03/25/2017   • Gastrointestinal hemorrhage associated with gastric ulcer 03/26/2017   • Iron deficiency anemia 11/02/2018   • Grief 09/16/2019     Resolved Ambulatory Problems     Diagnosis Date Noted   • No Resolved Ambulatory Problems     Past Medical History:   Diagnosis Date   • Anemia    • Coronary atherosclerosis of native coronary artery    • Depression    • Hearing loss of both ears    • Hypertension    • Meniere disease    • Nonrheumatic mitral valve disorder    • Precordial pain    • Preprocedural cardiovascular examination    • Stomach ulcer    • Suicide attempt by drug ingestion (CMS/HCC) 1998   • Transfusion history        Past Surgical History:   Procedure Laterality Date   • CARDIAC CATHETERIZATION     • ECHO - CONVERTED  03/02/2014    Normal LV systolic function with EF of 60%. Early diastolic dysfunction. Left atrium moderately dilated. Normal RV size and function. Trace mitral and mild tricuspid regurg. No intracardiac mass, pericardial effusion or cardiac  thrombus seen   • ENDOSCOPY N/A 3/27/2017    Procedure: ESOPHAGOGASTRODUODENOSCOPY WITH CONTROL OF BLEED;  Surgeon: Eladio Esquivel DO;  Location: [facility] ENDOSCOPY;  Service:    • ENDOSCOPY N/A 7/24/2018    Procedure: ESOPHAGOGASTRODUODENOSCOPY;  Surgeon: Eladio Esquivel DO;  Location: [facility] ENDOSCOPY;  Service: Gastroenterology   • HYSTERECTOMY     • JOINT REPLACEMENT     • KNEE SURGERY Bilateral 2015    bilateral knee replacements   • TUBAL ABDOMINAL LIGATION  1973   • TUMOR REMOVAL Left 1970    fatty tumor removed from left breast   • TUMOR REMOVAL Right 1971    fatty tumor removal from the right shoulder       Social History     Socioeconomic History   • Marital status:      Spouse name: Not on file   • Number of children: Not on file   • Years of education: Not on file   • Highest education level: Not on file   Tobacco Use   • Smoking status: Former Smoker   • Smokeless tobacco: Never Used   Substance and Sexual Activity   • Alcohol use: No   • Drug use: No   • Sexual activity: Defer   Social History Narrative    Quit smoking 10 years ago    Lives with her     No drugs, no alcohol        Family History   Problem Relation Age of Onset   • Heart disease Mother    • Cancer Mother         colon   • Early death Mother 68        colon cancer   • Hypertension Mother    • Hyperlipidemia Mother    • Heart disease Father    • Alcohol abuse Father    • Early death Father 56        heart attack   • Hypertension Father    • Hyperlipidemia Father    • Cancer Maternal Grandmother         stomach   • Cancer Maternal Grandfather    • Cancer Paternal Grandfather         stomach         Review of Systems   CONSTITUTIONAL: fatigue +  Weakness + No weight loss, fever, chills.   HEENT: Eyes: hearing loss + No visual loss, blurred vision, double vision or yellow sclerae. Ears, Nose, Throat: No hearing loss, sneezing, congestion, runny nose or sore throat.  SKIN: No rash or itching.  CARDIOVASCULAR: No chest pain,  "chest pressure or chest discomfort. No palpitations or edema.  RESPIRATORY: No shortness of breath, cough or sputum.  GASTROINTESTINAL: No anorexia, nausea, vomiting or diarrhea. No abdominal pain or blood.  GENITOURINARY: Negative for urgency, frequency or dysuria.   NEUROLOGICAL: chronic dizziness and vertigo + No headache, syncope, paralysis, ataxia, numbness or tingling in the extremities. No change in bowel or bladder control.  MUSCULOSKELETAL: chronic arthritis pain +   HEMATOLOGIC: anemia + No  bleeding or bruising.  LYMPHATICS: No enlarged nodes. No history of splenectomy.  PSYCHIATRIC: depression +   ENDOCRINOLOGIC: No reports of sweating, cold or heat intolerance. No polyuria or polydipsia.  ALLERGIES: No history of asthma, hives, eczema or rhinitis.          Medications:  The current medication list was reviewed in the EMR    ALLERGIES:    Allergies   Allergen Reactions   • Amoxicillin Rash   • Darvon [Propoxyphene] Rash       Objective      Vitals:    12/16/19 1317   BP: 180/86   Pulse: 63   Resp: 18   Temp: 97.8 °F (36.6 °C)   TempSrc: Temporal   Weight: 70.1 kg (154 lb 9.6 oz)   Height: 162.6 cm (64.02\")   PainSc:   8   PainLoc: Back     Current Status 12/16/2019   ECOG score 0       Physical Exam      General: Alert, awake, oriented.  Well dressed.  Not in apparent distress. Vitals as above.   HEAD: normocephalic, atraumatic.   EYES: PERRL, EOMI. Fundi normal, vision is grossly intact.  Neck: Supple, no adenopathy or thyromegaly.   Throat: normal oral cavity and pharynx. No inflammation, swelling, exudate, or lesions.  CARDIAC: Normal S1 and S2. No S3, S4 or murmurs. Rhythm is regular.Extremities are warm and well perfused.   LUNGS: Clear to auscultation and percussion without rales, rhonchi, wheezing or diminished breath sounds.  ABDOMEN: Positive bowel sounds. Soft, nondistended, nontender  . No guarding or rebound. No masses.  Back: . No bony tenderness.   EXTREMITIES: Diffuse arthritis involving " multiple joints +  Peripheral pulses intact. No varicosities.  Skin: No rash or bruising.  Neurological: Grossly non-focal exam. No focal weakness. Gait: Normal.   Psych: Mood and affect normal. No hallucination or suicidal thoughts.   Lymphatics: No cervical, axillary adenopathy.           RECENT LABS:Independently reviewed and summarized.  Hematology WBC   Date Value Ref Range Status   12/16/2019 6.32 3.40 - 10.80 10*3/mm3 Final     RBC   Date Value Ref Range Status   12/16/2019 4.21 3.77 - 5.28 10*6/mm3 Final     Hemoglobin   Date Value Ref Range Status   12/16/2019 12.0 12.0 - 15.9 g/dL Final     Hematocrit   Date Value Ref Range Status   12/16/2019 36.4 34.0 - 46.6 % Final     Platelets   Date Value Ref Range Status   12/16/2019 217 140 - 450 10*3/mm3 Final          Upper endoscopy from 7/24/18 result reviewed. Showed large hiatal hernia. Prior ulcers have healed.   Small bowel capsule endoscopy result from 5/12/17 reviewed and showed small bowel ulcers with bleeding. AVM of small bowel also noted.     Fouzia Maldonado reports a pain score of 8.  Given her pain assessment as noted, treatment options were discussed and the following options were decided upon as a follow-up plan to address the patient's pain: referral to Primary Care for assistance in pain treatment guidance.    PHQ-9 Total Score: 9  Referral to psych offered. She refused.         Diagnosis:   (1) Iron deficiency anemia   (2) GI bleeding   (3) History of small bowel AVM, ulcers  (4) Coronary artery disease       Assessment/Plan       (1) Iron deficiency anemia:     Her hemoglobin is stable however iron level is low as indicated by low saturation and low ferritin.  She is unable to tolerate oral iron due to GI side effects.  We will arrange for single IV Injectafer infusion.  Recheck CBC and iron studies in 3 months.      (2) GI bleeding , (3) History of small bowel AVM, ulcers: Patient with history of GI bleeding from gastric ulcer as  well as small bowel AVM/ulcers. Recent upper endoscopy showed resolved/healed gastric ulcer. No further bleeding.      (4) Coronary artery disease: Stable. On aspirin. Follow up with cardiology.    Discussed mammogram screening for breast cancer.  She has not had mammogram in 3 years.  She wants to defer this at moment as she is dealing with a lot of family and other health issues.      Sudeep Jones MD               12/16/2019      CC:

## 2019-12-16 NOTE — TELEPHONE ENCOUNTER
Informed patient of information. Gave date/time of appointment. She verbalized understanding.    97.2

## 2019-12-16 NOTE — ACP (ADVANCE CARE PLANNING)
Patient can make her own decision. She wants to be full code. Does not have living will. She is not interested in living will discussion at this time. She was encouraged to do this and resources were offered. We will re-evaluate on next visit.

## 2019-12-16 NOTE — TELEPHONE ENCOUNTER
----- Message from Shukri Jones MD sent at 12/16/2019  2:37 PM CST -----  Her iron level is low.   Please arrange for single injectofer infusion next week.

## 2019-12-23 RX ORDER — RANOLAZINE 500 MG/1
500 TABLET, EXTENDED RELEASE ORAL 2 TIMES DAILY
Qty: 180 TABLET | Refills: 3 | Status: SHIPPED | OUTPATIENT
Start: 2019-12-23 | End: 2021-03-25

## 2019-12-23 RX ORDER — CARVEDILOL 3.12 MG/1
3.12 TABLET ORAL 2 TIMES DAILY
Qty: 180 TABLET | Refills: 3 | Status: SHIPPED | OUTPATIENT
Start: 2019-12-23 | End: 2023-01-16 | Stop reason: SDUPTHER

## 2019-12-26 ENCOUNTER — INFUSION (OUTPATIENT)
Dept: ONCOLOGY | Facility: HOSPITAL | Age: 69
End: 2019-12-26

## 2019-12-26 VITALS
DIASTOLIC BLOOD PRESSURE: 60 MMHG | HEART RATE: 68 BPM | SYSTOLIC BLOOD PRESSURE: 130 MMHG | TEMPERATURE: 97.8 F | RESPIRATION RATE: 18 BRPM

## 2019-12-26 DIAGNOSIS — D50.9 IRON DEFICIENCY ANEMIA, UNSPECIFIED IRON DEFICIENCY ANEMIA TYPE: Primary | ICD-10-CM

## 2019-12-26 PROCEDURE — 96365 THER/PROPH/DIAG IV INF INIT: CPT | Performed by: INTERNAL MEDICINE

## 2019-12-26 PROCEDURE — 25010000002 FERRIC CARBOXYMALTOSE 750 MG/15ML SOLUTION 15 ML VIAL: Performed by: INTERNAL MEDICINE

## 2019-12-26 RX ORDER — SODIUM CHLORIDE 9 MG/ML
250 INJECTION, SOLUTION INTRAVENOUS ONCE
Status: COMPLETED | OUTPATIENT
Start: 2019-12-26 | End: 2019-12-26

## 2019-12-26 RX ORDER — METHYLPREDNISOLONE SODIUM SUCCINATE 125 MG/2ML
125 INJECTION, POWDER, LYOPHILIZED, FOR SOLUTION INTRAMUSCULAR; INTRAVENOUS AS NEEDED
Status: DISCONTINUED | OUTPATIENT
Start: 2019-12-26 | End: 2019-12-26 | Stop reason: HOSPADM

## 2019-12-26 RX ORDER — SODIUM CHLORIDE 9 MG/ML
250 INJECTION, SOLUTION INTRAVENOUS ONCE
Status: CANCELLED | OUTPATIENT
Start: 2020-01-02

## 2019-12-26 RX ORDER — DIPHENHYDRAMINE HYDROCHLORIDE 50 MG/ML
50 INJECTION INTRAMUSCULAR; INTRAVENOUS AS NEEDED
Status: CANCELLED | OUTPATIENT
Start: 2020-01-02

## 2019-12-26 RX ORDER — METHYLPREDNISOLONE SODIUM SUCCINATE 125 MG/2ML
125 INJECTION, POWDER, LYOPHILIZED, FOR SOLUTION INTRAMUSCULAR; INTRAVENOUS AS NEEDED
Status: CANCELLED | OUTPATIENT
Start: 2020-01-02

## 2019-12-26 RX ORDER — DIPHENHYDRAMINE HYDROCHLORIDE 50 MG/ML
50 INJECTION INTRAMUSCULAR; INTRAVENOUS AS NEEDED
Status: DISCONTINUED | OUTPATIENT
Start: 2019-12-26 | End: 2019-12-26 | Stop reason: HOSPADM

## 2019-12-26 RX ADMIN — SODIUM CHLORIDE 250 ML: 9 INJECTION, SOLUTION INTRAVENOUS at 13:55

## 2019-12-26 RX ADMIN — FERRIC CARBOXYMALTOSE INJECTION 750 MG: 50 INJECTION, SOLUTION INTRAVENOUS at 14:05

## 2020-01-02 ENCOUNTER — APPOINTMENT (OUTPATIENT)
Dept: ONCOLOGY | Facility: HOSPITAL | Age: 70
End: 2020-01-02

## 2020-01-07 ENCOUNTER — OFFICE VISIT (OUTPATIENT)
Dept: CARDIOLOGY | Facility: CLINIC | Age: 70
End: 2020-01-07

## 2020-01-07 VITALS
HEIGHT: 64 IN | SYSTOLIC BLOOD PRESSURE: 138 MMHG | OXYGEN SATURATION: 98 % | BODY MASS INDEX: 25.95 KG/M2 | DIASTOLIC BLOOD PRESSURE: 80 MMHG | WEIGHT: 152 LBS | HEART RATE: 62 BPM

## 2020-01-07 DIAGNOSIS — I10 ESSENTIAL HYPERTENSION: Primary | ICD-10-CM

## 2020-01-07 DIAGNOSIS — E78.5 HYPERLIPIDEMIA, UNSPECIFIED HYPERLIPIDEMIA TYPE: ICD-10-CM

## 2020-01-07 DIAGNOSIS — I25.10 CORONARY ARTERY DISEASE INVOLVING NATIVE CORONARY ARTERY OF NATIVE HEART WITHOUT ANGINA PECTORIS: ICD-10-CM

## 2020-01-07 PROCEDURE — 99214 OFFICE O/P EST MOD 30 MIN: CPT | Performed by: INTERNAL MEDICINE

## 2020-01-07 RX ORDER — GABAPENTIN 800 MG/1
800 TABLET ORAL 2 TIMES DAILY
COMMUNITY
Start: 2019-12-18 | End: 2022-09-13

## 2020-01-07 NOTE — PROGRESS NOTES
Fouzia Pryornne Missy Maldonado  69 y.o. female       1/7/2020  1. Essential hypertension    2. Hyperlipidemia, unspecified hyperlipidemia type    3. Coronary artery disease involving native coronary artery of native heart without angina pectoris        History of Present Illness:    .Patient's Body mass index is 26.07 kg/m². BMI is within normal parameters. No follow-up required..      69 years old patient under a lot of social stress as her  suffered a second stroke and has to take care of and passed away and she is recovering from grief him fluctuating blood pressure with history of hypertension hypertensive heart disease mild-to-moderate regurgitation , nonobstructive coronary to disease underwent  stress test reported normal left  systolic function no evidence of ischemia.  The patient had history of pancreatitis complicated with GI bleeding history of AV malformation.  She continued sick in the stomach with symptom of nauseous / dyspeptic symptom and and discomfort in the epigastric area.  On Ranexa no further recurrence of chest pain hr blood pressure noted in the higher side she stopped taking Vasotec on her own.  We'll restart back on.   She denies any typical chest pain.  Denies any orthopnea PND or become short-winded with a moderate level of physical activity possibility of physical deconditioning cannot be excluded.     ECHO 1/2017  · Mild-to-moderate mitral valve regurgitation is present  · Left ventricular wall thickness is consistent with mild concentric hypertrophy.  · Mild pulmonic valve regurgitation is present.  · Estimated EF = 66%.  · Left atrial cavity size is mild-to-moderately dilated.        2017  · Myocardial perfusion imaging indicates a normal myocardial perfusion study with no evidence of ischemia.  · Impressions are consistent with a low risk study.  · Left ventricular ejection fraction is normal (Calculated EF = 61%  SUBJECTIVE:    Allergies   Allergen Reactions   • Amoxicillin  Rash   • Darvon [Propoxyphene] Rash         Past Medical History:   Diagnosis Date   • Anemia    • Arthritis    • Coronary atherosclerosis of native coronary artery    • Depression    • Hearing loss of both ears     can hear nothing out of left ear and has right ear loss as well   • Hyperlipidemia    • Hypertension    • Meniere disease    • Nonrheumatic mitral valve disorder    • Pancreatitis    • Precordial pain    • Preprocedural cardiovascular examination    • Stomach ulcer    • Suicide attempt by drug ingestion (CMS/HCC) 1998   • Transfusion history          Past Surgical History:   Procedure Laterality Date   • CARDIAC CATHETERIZATION     • ECHO - CONVERTED  03/02/2014    Normal LV systolic function with EF of 60%. Early diastolic dysfunction. Left atrium moderately dilated. Normal RV size and function. Trace mitral and mild tricuspid regurg. No intracardiac mass, pericardial effusion or cardiac thrombus seen   • ENDOSCOPY N/A 3/27/2017    Procedure: ESOPHAGOGASTRODUODENOSCOPY WITH CONTROL OF BLEED;  Surgeon: Eladio Esquivel DO;  Location: [facility] ENDOSCOPY;  Service:    • ENDOSCOPY N/A 7/24/2018    Procedure: ESOPHAGOGASTRODUODENOSCOPY;  Surgeon: Eladio Esquivel DO;  Location: [facility] ENDOSCOPY;  Service: Gastroenterology   • HYSTERECTOMY     • JOINT REPLACEMENT     • KNEE SURGERY Bilateral 2015    bilateral knee replacements   • TUBAL ABDOMINAL LIGATION  1973   • TUMOR REMOVAL Left 1970    fatty tumor removed from left breast   • TUMOR REMOVAL Right 1971    fatty tumor removal from the right shoulder         Family History   Problem Relation Age of Onset   • Heart disease Mother    • Cancer Mother         colon   • Early death Mother 68        colon cancer   • Hypertension Mother    • Hyperlipidemia Mother    • Heart disease Father    • Alcohol abuse Father    • Early death Father 56        heart attack   • Hypertension Father    • Hyperlipidemia Father    • Cancer Maternal Grandmother         stomach   •  Cancer Maternal Grandfather    • Cancer Paternal Grandfather         stomach         Social History     Socioeconomic History   • Marital status:      Spouse name: Not on file   • Number of children: Not on file   • Years of education: Not on file   • Highest education level: Not on file   Tobacco Use   • Smoking status: Former Smoker   • Smokeless tobacco: Never Used   Substance and Sexual Activity   • Alcohol use: No   • Drug use: No   • Sexual activity: Defer   Social History Narrative    Quit smoking 10 years ago    Lives with her     No drugs, no alcohol          Current Outpatient Medications   Medication Sig Dispense Refill   • amLODIPine (NORVASC) 10 MG tablet Take 1 tablet by mouth Daily. 30 tablet 11   • aspirin 81 MG chewable tablet Chew 81 mg Daily.     • carvedilol (COREG) 3.125 MG tablet Take 1 tablet by mouth 2 (Two) Times a Day. 180 tablet 3   • enalapril (VASOTEC) 5 MG tablet TAKE 1 TABLET EVERY DAY 90 tablet 5   • FLUoxetine (PROzac) 20 MG capsule Take 20 mg by mouth Daily.     • gabapentin (NEURONTIN) 800 MG tablet Take 800 mg by mouth 2 (Two) Times a Day. Patient takes .5 tab in the morning and 1 tablet at night     • gemfibrozil (LOPID) 600 MG tablet Take 600 mg by mouth 2 (Two) Times a Day Before Meals.     • hydrochlorothiazide (MICROZIDE) 12.5 MG capsule Take 12.5 mg by mouth Daily.     • Magnesium Hydroxide (MAGNESIA PO) Take 250 mg by mouth Daily.     • meloxicam (MOBIC) 15 MG tablet TAKE 1 TABLET EVERY DAY WITH A MEAL 90 tablet 4   • pantoprazole (PROTONIX) 40 MG EC tablet Take 40 mg by mouth 2 (Two) Times a Day.     • Potassium 99 MG tablet Take 1 tablet by mouth Every Night.     • pravastatin (PRAVACHOL) 40 MG tablet Take 40 mg by mouth Daily.     • ranolazine (RANEXA) 500 MG 12 hr tablet Take 1 tablet by mouth 2 (Two) Times a Day. 180 tablet 3   • sucralfate (CARAFATE) 1 g tablet Take 1 g by mouth 4 (Four) Times a Day.     • temazepam (RESTORIL) 15 MG capsule      •  "vitamin B-12 (CYANOCOBALAMIN) 1000 MCG tablet Take 1,000 mcg by mouth Daily.     • vitamin D (ERGOCALCIFEROL) 69096 UNITS capsule capsule Take 50,000 Units by mouth 1 (One) Time Per Week.       No current facility-administered medications for this visit.            Review of Systems:     Constitutional:  Denies recent weight loss, weight gain, fever or chills, no change in exercise tolerance.     HENT:  Denies any hearing loss, epistaxis, hoarseness, or difficulty speaking.     Eyes: Wears eyeglasses or contact lenses.    Respiratory:  Denies dyspnea with exertion,no cough, wheezing, or hemoptysis.     Cardiovascular: See H&P    Gastrointestinal:  Denies change in bowel habits, dyspepsia, ulcer disease, hematochezia, or melena.     Endocrine: Negative for cold intolerance, heat intolerance, polydipsia, polyphagia and polyuria. Denies any history of weight change, polydipsia, polyuria.     Genitourinary: Negative.      Musculoskeletal: Denies any history of arthritic symptoms or back problems.     Skin:  Denies any change in hair or nails, rashes, or skin lesions.     Allergic/Immunologic: Negative.  Negative for environmental allergies, food allergies and immunocompromised state.     Neurological:  Denies any history of recurrent headaches, strokes, TIA, or seizure disorder.     Hematological: Denies any food allergies, seasonal allergies, bleeding disorders, or lymphadenopathy.     Psychiatric/Behavioral: Denies any history of depression, substance abuse, or change in cognitive function.       OBJECTIVE:    /80   Pulse 62   Ht 162.6 cm (64.02\")   Wt 68.9 kg (152 lb)   SpO2 98%   BMI 26.07 kg/m²       Physical Exam:     Constitutional: Cooperative, alert and oriented, well-developed, well-nourished, in no acute distress.     HENT:   Head: Normocephalic, normal hair patterns, no masses or tenderness.  Ears, Nose, and Throat: No gross abnormalities. No pallor or cyanosis. Dentition good.   Eyes: EOMS " intact, PERRL, conjunctivae and lids unremarkable. Fundoscopic exam and visual fields not performed.   Neck: No palpable masses or adenopathy, no thyromegaly, no JVD, carotid pulses are full and equal bilaterally and without  Bruits.     Cardiovascular: Regular rhythm, S1 and S2 normal, no S3 or S4. Apical impulse not displaced. No murmurs, gallops, or rubs detected.     Pulmonary/Chest: Chest: normal symmetry, no tenderness to palpation, normal respiratory excursion, no intercostal retraction, no use of accessory muscles. Pulmonary: Normal breath sounds. No rales or rhonchi.    Abdominal: Abdomen soft, bowel sounds normoactive, no masses, no hepatosplenomegaly, non-tender, no bruits.     Musculoskeletal: No deformities, clubbing, cyanosis, erythema, or edema observed. There are no spinal abnormalities noted. Normal muscle strength and tone. Pulses full and equal in all extremities, no bruits auscultated.     Neurological: No gross motor or sensory deficits noted, affect appropriate, oriented to time, person, place.     Skin: Warm and dry to the touch, no apparent skin lesions or masses noted.     Psychiatric: She has a normal mood and affect. Her behavior is normal. Judgment and thought content normal.         Procedures      Lab Results   Component Value Date    WBC 6.32 12/16/2019    HGB 12.0 12/16/2019    HCT 36.4 12/16/2019    MCV 86.5 12/16/2019     12/16/2019     Lab Results   Component Value Date    GLUCOSE 97 03/28/2017    BUN 12 03/28/2017    CREATININE 0.92 03/28/2017    EGFRIFNONA 61 03/28/2017    BCR 13.0 03/28/2017    CO2 24.0 03/28/2017    CALCIUM 8.7 03/28/2017    ALBUMIN 3.70 03/28/2017    AST 20 03/28/2017    ALT 21 03/28/2017     No results found for: CHOL  No results found for: TRIG  No results found for: HDL  No components found for: LDLCALC  No results found for: LDL  No results found for: HDLLDLRATIO  No components found for: CHOLHDL  No results found for: HGBA1C  Lab Results    Component Value Date    TSH 2.63 03/03/2014           ASSESSMENT AND PLAN:    #1 history of nonobstructive coronary to disease within normal recent stress test #2 hypertension with hypertensive heart disease #3 mild to moderate mitral regurgitation.  #4 dyspnea on exertion may be multifactorial needed in etiology.  #5 history of for dyspepsia/gastritis with previous history of pancreatitis complicated with GI bleed     Clinically there wasn't enough for any acute cardiac decompensation based on the clinical history physical finding.  The shortness breath and admitted maybe multifactorial the possibility of physical deconditioning cannot be excluded.   complaint in the sickness in the stomach/nauseous feeling and at present being treated with Carafate and Prilosec.  She is a resident of [facility] hypertension being managed with amlodipine, hydrochlorothiazide.      Had good blood pressure control and recovering from her spouse loss.  She responded well to Ranexa and low-dose nitrate.  Will check the lipid profile given history of hyperlipidemia and CAD.  Patient not interested in further risk stratification given the significant improvement in quality of life and medicine is working very well.   Her blood pressure is much better and no typical chest pain reported.  We will see her back in 6 months undependable patient clinical conditions.  Fouzia was seen today for follow-up.    Diagnoses and all orders for this visit:    Essential hypertension    Hyperlipidemia, unspecified hyperlipidemia type    Coronary artery disease involving native coronary artery of native heart without angina pectoris        Robbin Matthews MD  1/7/2020  10:43 AM

## 2020-03-16 ENCOUNTER — TELEPHONE (OUTPATIENT)
Dept: ONCOLOGY | Facility: HOSPITAL | Age: 70
End: 2020-03-16

## 2020-03-16 ENCOUNTER — OFFICE VISIT (OUTPATIENT)
Dept: ONCOLOGY | Facility: CLINIC | Age: 70
End: 2020-03-16

## 2020-03-16 ENCOUNTER — LAB (OUTPATIENT)
Dept: ONCOLOGY | Facility: HOSPITAL | Age: 70
End: 2020-03-16

## 2020-03-16 VITALS
TEMPERATURE: 98.3 F | DIASTOLIC BLOOD PRESSURE: 71 MMHG | HEIGHT: 64 IN | HEART RATE: 66 BPM | BODY MASS INDEX: 25.32 KG/M2 | RESPIRATION RATE: 18 BRPM | WEIGHT: 148.3 LBS | SYSTOLIC BLOOD PRESSURE: 179 MMHG

## 2020-03-16 DIAGNOSIS — D50.9 IRON DEFICIENCY ANEMIA, UNSPECIFIED IRON DEFICIENCY ANEMIA TYPE: Primary | ICD-10-CM

## 2020-03-16 DIAGNOSIS — I25.10 CORONARY ARTERY DISEASE INVOLVING NATIVE CORONARY ARTERY OF NATIVE HEART WITHOUT ANGINA PECTORIS: ICD-10-CM

## 2020-03-16 DIAGNOSIS — D62 ACUTE BLOOD LOSS ANEMIA: ICD-10-CM

## 2020-03-16 DIAGNOSIS — D50.9 IRON DEFICIENCY ANEMIA, UNSPECIFIED IRON DEFICIENCY ANEMIA TYPE: ICD-10-CM

## 2020-03-16 LAB
BASOPHILS # BLD AUTO: 0.03 10*3/MM3 (ref 0–0.2)
BASOPHILS NFR BLD AUTO: 0.4 % (ref 0–1.5)
DEPRECATED RDW RBC AUTO: 42.8 FL (ref 37–54)
EOSINOPHIL # BLD AUTO: 0.13 10*3/MM3 (ref 0–0.4)
EOSINOPHIL NFR BLD AUTO: 1.6 % (ref 0.3–6.2)
ERYTHROCYTE [DISTWIDTH] IN BLOOD BY AUTOMATED COUNT: 13.5 % (ref 12.3–15.4)
FERRITIN SERPL-MCNC: 136.6 NG/ML (ref 13–150)
HCT VFR BLD AUTO: 41.7 % (ref 34–46.6)
HGB BLD-MCNC: 13.9 G/DL (ref 12–15.9)
IMM GRANULOCYTES # BLD AUTO: 0.02 10*3/MM3 (ref 0–0.05)
IMM GRANULOCYTES NFR BLD AUTO: 0.3 % (ref 0–0.5)
IRON 24H UR-MRATE: 88 MCG/DL (ref 37–145)
IRON SATN MFR SERPL: 30 % (ref 20–50)
LYMPHOCYTES # BLD AUTO: 1.59 10*3/MM3 (ref 0.7–3.1)
LYMPHOCYTES NFR BLD AUTO: 20 % (ref 19.6–45.3)
MCH RBC QN AUTO: 29 PG (ref 26.6–33)
MCHC RBC AUTO-ENTMCNC: 33.3 G/DL (ref 31.5–35.7)
MCV RBC AUTO: 86.9 FL (ref 79–97)
MONOCYTES # BLD AUTO: 0.68 10*3/MM3 (ref 0.1–0.9)
MONOCYTES NFR BLD AUTO: 8.5 % (ref 5–12)
NEUTROPHILS # BLD AUTO: 5.51 10*3/MM3 (ref 1.7–7)
NEUTROPHILS NFR BLD AUTO: 69.2 % (ref 42.7–76)
NRBC BLD AUTO-RTO: 0 /100 WBC (ref 0–0.2)
PLATELET # BLD AUTO: 209 10*3/MM3 (ref 140–450)
PMV BLD AUTO: 10.1 FL (ref 6–12)
RBC # BLD AUTO: 4.8 10*6/MM3 (ref 3.77–5.28)
TIBC SERPL-MCNC: 292 MCG/DL (ref 298–536)
TRANSFERRIN SERPL-MCNC: 196 MG/DL (ref 200–360)
WBC NRBC COR # BLD: 7.96 10*3/MM3 (ref 3.4–10.8)

## 2020-03-16 PROCEDURE — 85025 COMPLETE CBC W/AUTO DIFF WBC: CPT | Performed by: INTERNAL MEDICINE

## 2020-03-16 PROCEDURE — G0463 HOSPITAL OUTPT CLINIC VISIT: HCPCS | Performed by: INTERNAL MEDICINE

## 2020-03-16 PROCEDURE — 99213 OFFICE O/P EST LOW 20 MIN: CPT | Performed by: INTERNAL MEDICINE

## 2020-03-16 PROCEDURE — 84466 ASSAY OF TRANSFERRIN: CPT | Performed by: INTERNAL MEDICINE

## 2020-03-16 PROCEDURE — 82728 ASSAY OF FERRITIN: CPT | Performed by: INTERNAL MEDICINE

## 2020-03-16 PROCEDURE — 83540 ASSAY OF IRON: CPT | Performed by: INTERNAL MEDICINE

## 2020-03-16 NOTE — TELEPHONE ENCOUNTER
----- Message from Shukri Jones MD sent at 3/16/2020  2:21 PM CDT -----  Iron studies are normal. Let patient know

## 2020-03-16 NOTE — PROGRESS NOTES
"Fouzia To Missy Maldonado  [id number]  1950    Subjective     Ms Maldonado presented for her follow up appointment for iron deficiency anemia and blood loss anemia.   Hg and iron studies have improved.   She is unable to tolerate oral iron due to GI side effects.    Discussed mammogram screening for breast cancer.  She has not had mammogram in 3 years.  She wants to defer this at moment as she is dealing with a lot of family and other health issues.  ROS as below.        History of Present Illness      This is a very pleasant 68-year-old female who was seen in consultation at the request Gian SHARP for evaluation of anemia.  Patient is here today with her daughter and lives in [place].  Patient unfortunately is a poor historian and most of the history was obtained by reviewing old medical records. She has past medical history of coronary artery disease, hypertension, hyperlipidemia, mitral bowel disease, depression with suicidal attempt, pancreatitis and anemia.  Patient tells me that she is been anemic \"all her life\".  However over a period of last couple of years her anemia has been more severe.  She tells me that she is received 2 units of blood transfusion about 1 year ago for suspected GI bleeding.  She tells me that she had an extensive GI evaluation performed in the past without any particular etiology.  However, upon review it appears that recently she was diagnosed with gastric ulcer and is possible that her bleeding is related to ulcer.  Her recent upper endoscopy showed healing of her ulcer.  In addition patient also had a capsule endoscopy performed in 2017 which showed small bowel ulcerations and AV malformation.  Patient denies any bright red blood per rectum or melena at present.  Denies any hematemesis or hemoptysis or hematuria.. She tells me that she has received IV iron infusions in the past many years ago and tells me that has tolerated it well in the past.  She tells me that she " is unable to take oral iron because of the GI discomfort.  I been asked to evaluate her anemia further.      Active Ambulatory Problems     Diagnosis Date Noted   • Pancreatitis    • Essential hypertension 01/10/2017   • Hyperlipidemia 01/10/2017   • Coronary artery disease involving native coronary artery of native heart without angina pectoris 01/10/2017   • Intercostal pain 01/10/2017   • Dyspnea 01/10/2017   • Non-rheumatic mitral regurgitation 03/20/2017   • Acute blood loss anemia 03/24/2017   • Pain of upper abdomen 03/24/2017   • Arthritis 03/24/2017   • Fever 03/25/2017   • Gastrointestinal hemorrhage associated with gastric ulcer 03/26/2017   • Iron deficiency anemia 11/02/2018   • Grief 09/16/2019     Resolved Ambulatory Problems     Diagnosis Date Noted   • No Resolved Ambulatory Problems     Past Medical History:   Diagnosis Date   • Anemia    • Coronary atherosclerosis of native coronary artery    • Depression    • Hearing loss of both ears    • Hypertension    • Meniere disease    • Nonrheumatic mitral valve disorder    • Precordial pain    • Preprocedural cardiovascular examination    • Stomach ulcer    • Suicide attempt by drug ingestion (CMS/[facility]) 1998   • Transfusion history        Past Surgical History:   Procedure Laterality Date   • CARDIAC CATHETERIZATION     • ECHO - CONVERTED  03/02/2014    Normal LV systolic function with EF of 60%. Early diastolic dysfunction. Left atrium moderately dilated. Normal RV size and function. Trace mitral and mild tricuspid regurg. No intracardiac mass, pericardial effusion or cardiac thrombus seen   • ENDOSCOPY N/A 3/27/2017    Procedure: ESOPHAGOGASTRODUODENOSCOPY WITH CONTROL OF BLEED;  Surgeon: Eladio Esquivel DO;  Location: [facility] ENDOSCOPY;  Service:    • ENDOSCOPY N/A 7/24/2018    Procedure: ESOPHAGOGASTRODUODENOSCOPY;  Surgeon: Eladio Esquivel DO;  Location: [facility] ENDOSCOPY;  Service: Gastroenterology   • HYSTERECTOMY     • JOINT REPLACEMENT     •  KNEE SURGERY Bilateral 2015    bilateral knee replacements   • TUBAL ABDOMINAL LIGATION  1973   • TUMOR REMOVAL Left 1970    fatty tumor removed from left breast   • TUMOR REMOVAL Right 1971    fatty tumor removal from the right shoulder       Social History     Socioeconomic History   • Marital status:      Spouse name: Not on file   • Number of children: Not on file   • Years of education: Not on file   • Highest education level: Not on file   Tobacco Use   • Smoking status: Former Smoker   • Smokeless tobacco: Never Used   Substance and Sexual Activity   • Alcohol use: No   • Drug use: No   • Sexual activity: Defer   Social History Narrative    Quit smoking 10 years ago    Lives with her     No drugs, no alcohol        Family History   Problem Relation Age of Onset   • Heart disease Mother    • Cancer Mother         colon   • Early death Mother 68        colon cancer   • Hypertension Mother    • Hyperlipidemia Mother    • Heart disease Father    • Alcohol abuse Father    • Early death Father 56        heart attack   • Hypertension Father    • Hyperlipidemia Father    • Cancer Maternal Grandmother         stomach   • Cancer Maternal Grandfather    • Cancer Paternal Grandfather         stomach         Review of Systems   CONSTITUTIONAL: fatigue +  Weakness + No weight loss, fever, chills.   HEENT: Eyes: hearing loss + No visual loss, blurred vision, double vision or yellow sclerae. Ears, Nose, Throat: No hearing loss, sneezing, congestion, runny nose or sore throat.  SKIN: No rash or itching.  CARDIOVASCULAR: No chest pain, chest pressure or chest discomfort. No palpitations or edema.  RESPIRATORY: No shortness of breath, cough or sputum.  GASTROINTESTINAL: No anorexia, nausea, vomiting or diarrhea. No abdominal pain or blood.  GENITOURINARY: Negative for urgency, frequency or dysuria.   NEUROLOGICAL: chronic dizziness and vertigo + No headache, syncope, paralysis, ataxia, numbness or tingling in the  "extremities. No change in bowel or bladder control.  MUSCULOSKELETAL: chronic arthritis pain +   HEMATOLOGIC: anemia + No  bleeding or bruising.  LYMPHATICS: No enlarged nodes. No history of splenectomy.  PSYCHIATRIC: depression +   ENDOCRINOLOGIC: No reports of sweating, cold or heat intolerance. No polyuria or polydipsia.  ALLERGIES: No history of asthma, hives, eczema or rhinitis.          Medications:  The current medication list was reviewed in the EMR    ALLERGIES:    Allergies   Allergen Reactions   • Amoxicillin Rash   • Darvon [Propoxyphene] Rash       Objective      Vitals:    03/16/20 1348   BP: 179/71   Pulse: 66   Resp: 18   Temp: 98.3 °F (36.8 °C)   TempSrc: Temporal   Weight: 67.3 kg (148 lb 4.8 oz)   Height: 162.6 cm (64.02\")   PainSc: 0-No pain     Current Status 12/26/2019   ECOG score 0       Physical Exam      General: Alert, awake, oriented.  Well dressed.  Not in apparent distress. Vitals as above.   HEAD: normocephalic, atraumatic.   EYES: PERRL, EOMI. Fundi normal, vision is grossly intact.  Neck: Supple, no adenopathy or thyromegaly.   Throat: normal oral cavity and pharynx. No inflammation, swelling, exudate, or lesions.  CARDIAC: Normal S1 and S2. No S3, S4 or murmurs. Rhythm is regular.Extremities are warm and well perfused.   LUNGS: Clear to auscultation and percussion without rales, rhonchi, wheezing or diminished breath sounds.  ABDOMEN: Positive bowel sounds. Soft, nondistended, nontender  . No guarding or rebound. No masses.  Back: . No bony tenderness.   EXTREMITIES: Diffuse arthritis involving multiple joints +  Peripheral pulses intact. No varicosities.  Skin: No rash or bruising.  Neurological: Grossly non-focal exam. No focal weakness. Gait: Normal.   Psych: Mood and affect normal. No hallucination or suicidal thoughts.   Lymphatics: No cervical, axillary adenopathy.           RECENT LABS:Independently reviewed and summarized.  Hematology WBC   Date Value Ref Range Status "   03/16/2020 7.96 3.40 - 10.80 10*3/mm3 Final     RBC   Date Value Ref Range Status   03/16/2020 4.80 3.77 - 5.28 10*6/mm3 Final     Hemoglobin   Date Value Ref Range Status   03/16/2020 13.9 12.0 - 15.9 g/dL Final     Hematocrit   Date Value Ref Range Status   03/16/2020 41.7 34.0 - 46.6 % Final     Platelets   Date Value Ref Range Status   03/16/2020 209 140 - 450 10*3/mm3 Final          Upper endoscopy from 7/24/18 result reviewed. Showed large hiatal hernia. Prior ulcers have healed.   Small bowel capsule endoscopy result from 5/12/17 reviewed and showed small bowel ulcers with bleeding. AVM of small bowel also noted.     Fouzia Maldonado reports a pain score of 0.  Given her pain assessment as noted, treatment options were discussed and the following options were decided upon as a follow-up plan to address the patient's pain: referral to Primary Care for assistance in pain treatment guidance.    Patient screened positive for depression based on a PHQ-9 score of 9 on 12/16/2019. Follow-up recommendations include: PCP managing depression.    Advance Care Planning   ACP discussion was declined by the patient. Patient does not have an advance directive, declines further assistance.      Diagnosis:   (1) Iron deficiency anemia   (2) GI bleeding   (3) History of small bowel AVM, ulcers  (4) Coronary artery disease       Assessment/Plan       (1) Iron deficiency anemia:     Hg and iron studies have improved.   She is unable to tolerate oral iron due to GI side effects.  Recheck CBC and iron studies in 3 months.      (2) GI bleeding , (3) History of small bowel AVM, ulcers: Patient with history of GI bleeding from gastric ulcer as well as small bowel AVM/ulcers. Recent upper endoscopy showed resolved/healed gastric ulcer. No further bleeding.      (4) Coronary artery disease: Stable. On aspirin. Follow up with cardiology.    Discussed mammogram screening for breast cancer.  She has not had mammogram in 3  years.  She wants to defer this at moment as she is dealing with a lot of family and other health issues.      Sudeep Jones MD               3/16/2020      CC:

## 2020-07-06 DIAGNOSIS — I25.10 CORONARY ARTERY DISEASE INVOLVING NATIVE CORONARY ARTERY OF NATIVE HEART WITHOUT ANGINA PECTORIS: Primary | ICD-10-CM

## 2020-07-13 ENCOUNTER — APPOINTMENT (OUTPATIENT)
Dept: ONCOLOGY | Facility: HOSPITAL | Age: 70
End: 2020-07-13

## 2020-07-13 ENCOUNTER — APPOINTMENT (OUTPATIENT)
Dept: ONCOLOGY | Facility: CLINIC | Age: 70
End: 2020-07-13

## 2020-07-13 ENCOUNTER — TELEPHONE (OUTPATIENT)
Dept: ONCOLOGY | Facility: CLINIC | Age: 70
End: 2020-07-13

## 2021-01-20 NOTE — PROGRESS NOTES
Fouzia To Missy Maldonado  69 y.o. female    04/30/2019  1. Essential hypertension    2. Hyperlipidemia, unspecified hyperlipidemia type    3. Coronary artery disease involving native coronary artery of native heart without angina pectoris    4. Non-rheumatic mitral regurgitation    5. Dyspnea, unspecified type        History of Present Illness:    69 years old patient under a lot of social stress as her  suffered a second stroke and has to take care of him fluctuating blood pressure with history of hypertension hypertensive heart disease mild-to-moderate regurgitation , nonobstructive coronary to disease underwent  stress test reported normal left  systolic function no evidence of ischemia.  The patient had history of pancreatitis complicated with GI bleeding.  She continued sick in the stomach with symptom of nauseous / dyspeptic symptom and and discomfort in the epigastric area.  She ran out Ranexa and Cipro sample given to her.  Her blood pressure noted in the higher side she stopped taking Vasotec on her own.  We'll restart back on.   She denies any typical chest pain.  Denies any orthopnea PND or become short-winded with a moderate level of physical activity possibility of physical deconditioning cannot be excluded.     ECHO 1/2017  · Mild-to-moderate mitral valve regurgitation is present  · Left ventricular wall thickness is consistent with mild concentric hypertrophy.  · Mild pulmonic valve regurgitation is present.  · Estimated EF = 66%.  · Left atrial cavity size is mild-to-moderately dilated.        2017  · Myocardial perfusion imaging indicates a normal myocardial perfusion study with no evidence of ischemia.  · Impressions are consistent with a low risk study.  · Left ventricular ejection fraction is normal (Calculated EF = 61%          SUBJECTIVE:    Allergies   Allergen Reactions   • Amoxicillin Rash   • Darvon [Propoxyphene] Rash         Past Medical History:   Diagnosis Date   • Anemia     • Arthritis    • Coronary atherosclerosis of native coronary artery    • Depression    • Hearing loss of both ears     can hear nothing out of left ear and has right ear loss as well   • Hyperlipidemia    • Hypertension    • Meniere disease    • Nonrheumatic mitral valve disorder    • Pancreatitis    • Precordial pain    • Preprocedural cardiovascular examination    • Stomach ulcer    • Suicide attempt by drug ingestion (CMS/[facility]) 1998   • Transfusion history          Past Surgical History:   Procedure Laterality Date   • CARDIAC CATHETERIZATION     • ECHO - CONVERTED  03/02/2014    Normal LV systolic function with EF of 60%. Early diastolic dysfunction. Left atrium moderately dilated. Normal RV size and function. Trace mitral and mild tricuspid regurg. No intracardiac mass, pericardial effusion or cardiac thrombus seen   • ENDOSCOPY N/A 3/27/2017    Procedure: ESOPHAGOGASTRODUODENOSCOPY WITH CONTROL OF BLEED;  Surgeon: Eladio Esquivel DO;  Location: [facility] ENDOSCOPY;  Service:    • ENDOSCOPY N/A 7/24/2018    Procedure: ESOPHAGOGASTRODUODENOSCOPY;  Surgeon: Eladio Esquivel DO;  Location: [facility] ENDOSCOPY;  Service: Gastroenterology   • HYSTERECTOMY     • JOINT REPLACEMENT     • KNEE SURGERY Bilateral 2015    bilateral knee replacements   • TUBAL ABDOMINAL LIGATION  1973   • TUMOR REMOVAL Left 1970    fatty tumor removed from left breast   • TUMOR REMOVAL Right 1971    fatty tumor removal from the right shoulder         Family History   Problem Relation Age of Onset   • Heart disease Mother    • Cancer Mother         colon   • Early death Mother 68        colon cancer   • Hypertension Mother    • Hyperlipidemia Mother    • Heart disease Father    • Alcohol abuse Father    • Early death Father 56        heart attack   • Hypertension Father    • Hyperlipidemia Father    • Cancer Maternal Grandmother         stomach   • Cancer Maternal Grandfather    • Cancer Paternal Grandfather         stomach         Social  History     Socioeconomic History   • Marital status:      Spouse name: Not on file   • Number of children: Not on file   • Years of education: Not on file   • Highest education level: Not on file   Tobacco Use   • Smoking status: Former Smoker   • Smokeless tobacco: Never Used   Substance and Sexual Activity   • Alcohol use: No   • Drug use: No   • Sexual activity: Defer   Social History Narrative    Quit smoking 10 years ago    Lives with her     No drugs, no alcohol          Current Outpatient Medications   Medication Sig Dispense Refill   • amLODIPine (NORVASC) 5 MG tablet Take 5 mg by mouth Daily.     • aspirin 81 MG chewable tablet Chew 81 mg Daily.     • enalapril (VASOTEC) 5 MG tablet TAKE 1 TABLET EVERY DAY 90 tablet 5   • FLUoxetine (PROzac) 20 MG capsule Take 20 mg by mouth Daily.     • gabapentin (NEURONTIN) 300 MG capsule Take 300 mg by mouth 2 (Two) Times a Day. 0.5 tab in am / 1 at night     • gemfibrozil (LOPID) 600 MG tablet Take 600 mg by mouth 2 (Two) Times a Day Before Meals.     • hydrochlorothiazide (MICROZIDE) 12.5 MG capsule Take 12.5 mg by mouth Daily.     • Magnesium Hydroxide (MAGNESIA PO) Take 250 mg by mouth Daily.     • meloxicam (MOBIC) 15 MG tablet TAKE 1 TABLET EVERY DAY WITH A MEAL 90 tablet 4   • pantoprazole (PROTONIX) 40 MG EC tablet Take 40 mg by mouth 2 (Two) Times a Day.     • Potassium 99 MG tablet Take 1 tablet by mouth Every Night.     • pravastatin (PRAVACHOL) 40 MG tablet Take 40 mg by mouth Daily.     • ranolazine (RANEXA) 500 MG 12 hr tablet Take 500 mg by mouth 2 (Two) Times a Day.     • sucralfate (CARAFATE) 1 g tablet Take 1 g by mouth 4 (Four) Times a Day.     • temazepam (RESTORIL) 15 MG capsule      • vitamin B-12 (CYANOCOBALAMIN) 1000 MCG tablet Take 1,000 mcg by mouth Daily.     • vitamin D (ERGOCALCIFEROL) 18483 UNITS capsule capsule Take 50,000 Units by mouth 1 (One) Time Per Week.       No current facility-administered medications for this  "visit.            Review of Systems:     Constitutional:  Denies recent weight loss, weight gain, fever or chills, no change in exercise tolerance.     HENT:  Denies any hearing loss, epistaxis, hoarseness, or difficulty speaking.     Eyes: Wears eyeglasses or contact lenses.    Respiratory:  Denies dyspnea with exertion,no cough, wheezing, or hemoptysis.     Cardiovascular: Negative for palpations, chest pain, orthopnea, PND, peripheral edema, syncope, or claudication.     Gastrointestinal:  Denies change in bowel habits, dyspepsia, ulcer disease, hematochezia, or melena.     Endocrine: Negative for cold intolerance, heat intolerance, polydipsia, polyphagia and polyuria. Denies any history of weight change, polydipsia, polyuria.     Genitourinary: Negative.      Musculoskeletal: Denies any history of arthritic symptoms or back problems.     Skin:  Denies any change in hair or nails, rashes, or skin lesions.     Allergic/Immunologic: Negative.  Negative for environmental allergies, food allergies and immunocompromised state.     Neurological:  Denies any history of recurrent headaches, strokes, TIA, or seizure disorder.     Hematological: Denies any food allergies, seasonal allergies, bleeding disorders, or lymphadenopathy.     Psychiatric/Behavioral: Denies any history of depression, substance abuse, or change in cognitive function.       OBJECTIVE:    /82   Pulse 70   Ht 162.6 cm (64\")   Wt 73.5 kg (162 lb)   SpO2 98%   BMI 27.81 kg/m²       Physical Exam:     Constitutional: Cooperative, alert and oriented, well-developed, well-nourished, in no acute distress.     HENT:   Head: Normocephalic, normal hair patterns, no masses or tenderness.  Ears, Nose, and Throat: No gross abnormalities. No pallor or cyanosis. Dentition good.   Eyes: EOMS intact, PERRL, conjunctivae and lids unremarkable. Fundoscopic exam and visual fields not performed.   Neck: No palpable masses or adenopathy, no thyromegaly, no JVD, " carotid pulses are full and equal bilaterally and without  Bruits.     Cardiovascular: Regular rhythm, S1 and S2 normal, no S3 or S4. Apical impulse not displaced. No murmurs, gallops, or rubs detected.     Pulmonary/Chest: Chest: normal symmetry, no tenderness to palpation, normal respiratory excursion, no intercostal retraction, no use of accessory muscles. Pulmonary: Normal breath sounds. No rales or rhonchi.    Abdominal: Abdomen soft, bowel sounds normoactive, no masses, no hepatosplenomegaly, non-tender, no bruits.     Musculoskeletal: No deformities, clubbing, cyanosis, erythema, or edema observed. There are no spinal abnormalities noted. Normal muscle strength and tone. Pulses full and equal in all extremities, no bruits auscultated.     Neurological: No gross motor or sensory deficits noted, affect appropriate, oriented to time, person, place.     Skin: Warm and dry to the touch, no apparent skin lesions or masses noted.     Psychiatric: She has a normal mood and affect. Her behavior is normal. Judgment and thought content normal.         Procedures      Lab Results   Component Value Date    WBC 6.33 04/30/2019    HGB 12.2 04/30/2019    HCT 35.2 04/30/2019    MCV 85.0 04/30/2019     04/30/2019     Lab Results   Component Value Date    GLUCOSE 97 03/28/2017    BUN 12 03/28/2017    CREATININE 0.92 03/28/2017    EGFRIFNONA 61 03/28/2017    BCR 13.0 03/28/2017    CO2 24.0 03/28/2017    CALCIUM 8.7 03/28/2017    ALBUMIN 3.70 03/28/2017    AST 20 03/28/2017    ALT 21 03/28/2017     No results found for: CHOL  No results found for: TRIG  No results found for: HDL  No components found for: LDLCALC  No results found for: LDL  No results found for: HDLLDLRATIO  No components found for: CHOLHDL  No results found for: HGBA1C  Lab Results   Component Value Date    TSH 2.63 03/03/2014           ASSESSMENT AND PLAN:  #1 history of nonobstructive coronary to disease within normal recent stress test #2 hypertension  with hypertensive heart disease #3 mild to moderate mitral regurgitation.  #4 dyspnea on exertion may be multifactorial needed in etiology.  #5 history of for dyspepsia/gastritis with previous history of pancreatitis complicated with GI bleed     Clinically there wasn't enough for any acute cardiac decompensation based on the clinical history physical finding.  The shortness breath and admitted maybe multifactorial the possibility of physical deconditioning cannot be excluded.   complaint in the sickness in the stomach/nauseous feeling and at present being treated with Carafate and Prilosec.  She is a resident of [facility] hypertension being managed with amlodipine, hydrochlorothiazide.    Increase the dose of amlodipine to 10 mg and will add Coreg starting a dose of 3.125 mg twice a day prescription refill sent to the pharmacy The patient is under a lot of stress as her  suffered a massive stroke affecting her quality of life.  Given the chest discomfort and previous history of nonobstructive CAD , previously offered the patient  further risk stratification with a stress test or CT coronary angiogram.    She wanted to back on Ranexa 500 mg p.o. twice daily.  Pravastatin for management of hyperlipidemia   Ranexa 500 mg twice a day and her stomach medication as before.  We'll lack to see her back in 6 month R depends on patient clinical conditions.        Fouzia was seen today for follow-up.    Diagnoses and all orders for this visit:    Essential hypertension    Hyperlipidemia, unspecified hyperlipidemia type    Coronary artery disease involving native coronary artery of native heart without angina pectoris    Non-rheumatic mitral regurgitation    Dyspnea, unspecified type        Robbin Matthews MD  4/30/2019  2:54 PM   Complex Repair And Flap Additional Text (Will Appearing After The Standard Complex Repair Text): The complex repair was not sufficient to completely close the primary defect. The remaining additional defect was repaired with the flap mentioned below.

## 2021-03-10 ENCOUNTER — HOSPITAL ENCOUNTER (OUTPATIENT)
Dept: NUCLEAR MEDICINE | Facility: HOSPITAL | Age: 71
Discharge: HOME | End: 2021-03-10

## 2021-03-10 DIAGNOSIS — R10.84 ABDOMINAL PAIN, GENERALIZED: ICD-10-CM

## 2021-03-10 PROCEDURE — 78226 HEPATOBILIARY SYSTEM IMAGING: CPT

## 2021-03-10 PROCEDURE — A9500 TC99M SESTAMIBI: HCPCS | Performed by: INTERNAL MEDICINE

## 2021-03-10 PROCEDURE — 0 TECHNETIUM SESTAMIBI: Performed by: INTERNAL MEDICINE

## 2021-03-10 PROCEDURE — A9537 TC99M MEBROFENIN: HCPCS | Performed by: INTERNAL MEDICINE

## 2021-03-10 PROCEDURE — 0 TECHNETIUM TC 99M MEBROFENIN KIT: Performed by: INTERNAL MEDICINE

## 2021-03-10 RX ORDER — KIT FOR THE PREPARATION OF TECHNETIUM TC 99M MEBROFENIN 45 MG/10ML
1 INJECTION, POWDER, LYOPHILIZED, FOR SOLUTION INTRAVENOUS
Status: COMPLETED | OUTPATIENT
Start: 2021-03-10 | End: 2021-03-10

## 2021-03-10 RX ADMIN — MEBROFENIN 1 DOSE: 45 INJECTION, POWDER, LYOPHILIZED, FOR SOLUTION INTRAVENOUS at 07:20

## 2021-03-15 ENCOUNTER — LAB (OUTPATIENT)
Dept: LAB | Facility: HOSPITAL | Age: 71
End: 2021-03-15

## 2021-03-15 DIAGNOSIS — Z01.818 PREOP TESTING: Primary | ICD-10-CM

## 2021-03-15 LAB — SARS-COV-2 ORF1AB RESP QL NAA+PROBE: NOT DETECTED

## 2021-03-15 PROCEDURE — C9803 HOPD COVID-19 SPEC COLLECT: HCPCS

## 2021-03-15 PROCEDURE — U0004 COV-19 TEST NON-CDC HGH THRU: HCPCS

## 2021-03-18 ENCOUNTER — ANESTHESIA (OUTPATIENT)
Dept: GASTROENTEROLOGY | Facility: HOSPITAL | Age: 71
End: 2021-03-18

## 2021-03-18 ENCOUNTER — ANESTHESIA EVENT (OUTPATIENT)
Dept: GASTROENTEROLOGY | Facility: HOSPITAL | Age: 71
End: 2021-03-18

## 2021-03-18 ENCOUNTER — HOSPITAL ENCOUNTER (OUTPATIENT)
Facility: HOSPITAL | Age: 71
Setting detail: HOSPITAL OUTPATIENT SURGERY
Discharge: HOME | End: 2021-03-18
Attending: INTERNAL MEDICINE | Admitting: INTERNAL MEDICINE

## 2021-03-18 VITALS
HEIGHT: 64 IN | OXYGEN SATURATION: 98 % | HEART RATE: 52 BPM | SYSTOLIC BLOOD PRESSURE: 107 MMHG | DIASTOLIC BLOOD PRESSURE: 54 MMHG | BODY MASS INDEX: 24.55 KG/M2 | RESPIRATION RATE: 16 BRPM | WEIGHT: 143.8 LBS | TEMPERATURE: 97 F

## 2021-03-18 DIAGNOSIS — R10.84 GENERALIZED ABDOMINAL PAIN: ICD-10-CM

## 2021-03-18 PROCEDURE — 88305 TISSUE EXAM BY PATHOLOGIST: CPT

## 2021-03-18 PROCEDURE — 87071 CULTURE AEROBIC QUANT OTHER: CPT | Performed by: INTERNAL MEDICINE

## 2021-03-18 PROCEDURE — 25010000002 PROPOFOL 10 MG/ML EMULSION: Performed by: NURSE ANESTHETIST, CERTIFIED REGISTERED

## 2021-03-18 RX ORDER — DEXTROSE AND SODIUM CHLORIDE 5; .45 G/100ML; G/100ML
30 INJECTION, SOLUTION INTRAVENOUS CONTINUOUS PRN
Status: DISCONTINUED | OUTPATIENT
Start: 2021-03-18 | End: 2021-03-18 | Stop reason: HOSPADM

## 2021-03-18 RX ORDER — LIDOCAINE HYDROCHLORIDE 20 MG/ML
INJECTION, SOLUTION EPIDURAL; INFILTRATION; INTRACAUDAL; PERINEURAL AS NEEDED
Status: DISCONTINUED | OUTPATIENT
Start: 2021-03-18 | End: 2021-03-18 | Stop reason: SURG

## 2021-03-18 RX ORDER — PROPOFOL 10 MG/ML
VIAL (ML) INTRAVENOUS AS NEEDED
Status: DISCONTINUED | OUTPATIENT
Start: 2021-03-18 | End: 2021-03-18 | Stop reason: SURG

## 2021-03-18 RX ADMIN — PROPOFOL 30 MG: 10 INJECTION, EMULSION INTRAVENOUS at 11:25

## 2021-03-18 RX ADMIN — LIDOCAINE HYDROCHLORIDE 50 MG: 20 INJECTION, SOLUTION EPIDURAL; INFILTRATION; INTRACAUDAL; PERINEURAL at 11:22

## 2021-03-18 RX ADMIN — DEXTROSE AND SODIUM CHLORIDE 30 ML/HR: 5; 450 INJECTION, SOLUTION INTRAVENOUS at 09:50

## 2021-03-18 RX ADMIN — PROPOFOL 70 MG: 10 INJECTION, EMULSION INTRAVENOUS at 11:22

## 2021-03-18 NOTE — H&P
Armen Bridges DO,[facility]  Gastroenterology  Hepatology  Endoscopy  Board Certified in Internal Medicine and gastroenterology  [address], [address]  [place]. [address]  - [phone]   F - [phone]     GASTROENTEROLOGY HISTORY AND PHYSICAL  NOTE   ARMEN BRIDGES DO.         SUBJECTIVE:   3/18/2021    Name: Fouzia Maldonado  DOD: 1950        Chief Complaint:       Subjective : Dyspepsia    Patient is 70 y.o. female presents with desire for elective EGD with biopsy and culture.      ROS/HISTORY/ CURRENT MEDICATIONS/OBJECTIVE/VS/PE:   Review of Systems:  All systems unremarkable unless specified below.  Constitutional   HENT  Eyes   Respiratory    Cardiovascular  Gastrointestinal   Endocrine  Genitourinary    Musculoskeletal   Skin  Allergic/Immunologic    Neurological    Hematological  Psychiatric/Behavioral    History:     Past Medical History:   Diagnosis Date   • Anemia    • Arthritis    • Coronary atherosclerosis of native coronary artery    • Depression    • Hearing loss of both ears     can hear nothing out of left ear and has right ear loss as well   • Hyperlipidemia    • Hypertension    • Meniere disease    • Nonrheumatic mitral valve disorder    • Pancreatitis    • PONV (postoperative nausea and vomiting)    • Precordial pain    • Preprocedural cardiovascular examination    • Sleep apnea    • Stomach ulcer    • Suicide attempt by drug ingestion (CMS/HCC) 1998   • Transfusion history      Past Surgical History:   Procedure Laterality Date   • CARDIAC CATHETERIZATION     • COLONOSCOPY     • ECHO - CONVERTED  03/02/2014    Normal LV systolic function with EF of 60%. Early diastolic dysfunction. Left atrium moderately dilated. Normal RV size and function. Trace mitral and mild tricuspid regurg. No intracardiac mass, pericardial effusion or cardiac thrombus seen   • ENDOSCOPY N/A 3/27/2017    Procedure: ESOPHAGOGASTRODUODENOSCOPY WITH CONTROL OF BLEED;  Surgeon: Armen Bridges,  ;  Location: [facility] ENDOSCOPY;  Service:    • ENDOSCOPY N/A 7/24/2018    Procedure: ESOPHAGOGASTRODUODENOSCOPY;  Surgeon: Eladio Esquivel DO;  Location: [facility] ENDOSCOPY;  Service: Gastroenterology   • HYSTERECTOMY     • JOINT REPLACEMENT     • KNEE SURGERY Bilateral 2015    bilateral knee replacements   • TUBAL ABDOMINAL LIGATION  1973   • TUMOR REMOVAL Left 1970    fatty tumor removed from left breast   • TUMOR REMOVAL Right 1971    fatty tumor removal from the right shoulder     Family History   Problem Relation Age of Onset   • Heart disease Mother    • Cancer Mother         colon   • Early death Mother 68        colon cancer   • Hypertension Mother    • Hyperlipidemia Mother    • Heart disease Father    • Alcohol abuse Father    • Early death Father 56        heart attack   • Hypertension Father    • Hyperlipidemia Father    • Cancer Maternal Grandmother         stomach   • Cancer Maternal Grandfather    • Cancer Paternal Grandfather         stomach     Social History     Tobacco Use   • Smoking status: Former Smoker     Quit date: 2006     Years since quitting: 15.2   • Smokeless tobacco: Never Used   Vaping Use   • Vaping Use: Never used   Substance Use Topics   • Alcohol use: No   • Drug use: No     Prior to Admission medications    Medication Sig Start Date End Date Taking? Authorizing Provider   amLODIPine (NORVASC) 10 MG tablet Take 1 tablet by mouth Daily. 4/30/19  Yes Robbin Matthews MD   carvedilol (COREG) 3.125 MG tablet Take 1 tablet by mouth 2 (Two) Times a Day. 12/23/19  Yes Robbin Matthews MD   enalapril (VASOTEC) 5 MG tablet TAKE 1 TABLET EVERY DAY 1/2/19  Yes Rangel Mayorga MD   FLUoxetine (PROzac) 20 MG capsule Take 20 mg by mouth Daily.   Yes ProviderRoxanne MD   gabapentin (NEURONTIN) 800 MG tablet Take 800 mg by mouth 2 (Two) Times a Day. Patient takes .5 tab in the morning and 1 tablet at night 12/18/19  Yes Roxanne Stern MD   gemfibrozil (LOPID) 600 MG tablet  Take 600 mg by mouth 2 (Two) Times a Day Before Meals.   Yes Roxanne Stern MD   hydrochlorothiazide (MICROZIDE) 12.5 MG capsule Take 12.5 mg by mouth Daily.   Yes Roxanne Stern MD   Magnesium Hydroxide (MAGNESIA PO) Take 250 mg by mouth Daily.   Yes Roxanne Stern MD   meloxicam (MOBIC) 15 MG tablet TAKE 1 TABLET EVERY DAY WITH A MEAL 4/22/19  Yes Wil Suazo MD   pantoprazole (PROTONIX) 40 MG EC tablet Take 40 mg by mouth 2 (Two) Times a Day.   Yes Roxanne Stern MD   Potassium 99 MG tablet Take 1 tablet by mouth Every Night.   Yes Roxanne Stern MD   pravastatin (PRAVACHOL) 40 MG tablet Take 40 mg by mouth Daily.   Yes Roxanne Stern MD   ranolazine (RANEXA) 500 MG 12 hr tablet Take 1 tablet by mouth 2 (Two) Times a Day. 12/23/19  Yes Robbin Matthews MD   sucralfate (CARAFATE) 1 g tablet Take 1 g by mouth 4 (Four) Times a Day.   Yes Roxanne Stern MD   temazepam (RESTORIL) 15 MG capsule Take 15 mg by mouth Every Night. 10/10/18  Yes Roxanne Stern MD   vitamin B-12 (CYANOCOBALAMIN) 1000 MCG tablet Take 1,000 mcg by mouth Daily.   Yes Roxanne Stern MD   vitamin D (ERGOCALCIFEROL) 96333 UNITS capsule capsule Take 50,000 Units by mouth 1 (One) Time Per Week.   Yes Roaxnne Stern MD   aspirin 81 MG chewable tablet Chew 81 mg Daily.    Roxanne Stern MD     Allergies:  Amoxicillin and Darvon [propoxyphene]    I have reviewed the patients medical history, surgical history and family history in the available medical record system.     Current Medications:     Current Facility-Administered Medications   Medication Dose Route Frequency Provider Last Rate Last Admin   • dextrose 5 % and sodium chloride 0.45 % infusion  30 mL/hr Intravenous Continuous PRN Eladio Esquivel DO 30 mL/hr at 03/18/21 0950 30 mL/hr at 03/18/21 0950       Objective     Physical Exam:   Temp:  [97.6 °F (36.4 °C)] 97.6 °F (36.4 °C)  Heart Rate:  [68]  68  Resp:  [16] 16  BP: (131)/(78) 131/78    Physical Exam:  General Appearance:    Alert, cooperative, in no acute distress   Head:    Normocephalic, without obvious abnormality, atraumatic   Eyes:            Lids and lashes normal, conjunctivae and sclerae normal, no icterus, no pallor, corneas clear, PERRLA   Ears:    Ears appear intact with no abnormalities noted   Throat:   No oral lesions, no thrush, oral mucosa moist   Neck:   No adenopathy, supple, trachea midline, no thyromegaly, no  carotid bruit, no JVD   Back:     No kyphosis present, no scoliosis present, no skin lesions,   erythema or scars, no tenderness to percussion or                 palpation,  range of motion normal   Lungs:     Clear to auscultation,respirations regular, even and         unlabored    Heart:    Regular rhythm and normal rate, normal S1 and S2, no  murmur, no gallop, no rub, no click   Breast Exam:    Deferred   Abdomen:     Normal bowel sounds, no masses, no organomegaly, soft  nontender, nondistended, no guarding, no rebound                 tenderness   Genitalia:    Deferred   Extremities:   Moves all extremities well, no edema, no cyanosis, no          redness   Pulses:   Pulses palpable and equal bilaterally   Skin:   No bleeding, bruising or rash   Lymph nodes:   No palpable adenopathy   Neurologic:   Cranial nerves 2 - 12 grossly intact, sensation intact, DTR     present and equal bilaterally      Results Review:     Lab Results   Component Value Date    WBC 7.96 03/16/2020    WBC 6.32 12/16/2019    WBC 9.70 09/16/2019    HGB 13.9 03/16/2020    HGB 12.0 12/16/2019    HGB 10.2 (L) 09/16/2019    HCT 41.7 03/16/2020    HCT 36.4 12/16/2019    HCT 30.3 (L) 09/16/2019     03/16/2020     12/16/2019     09/16/2019             No results found for: LIPASE  Lab Results   Component Value Date    INR 0.98 03/24/2017    INR 1.0 06/26/2016    INR 2.4 07/17/2015     No results found for: CULTURE    Radiology  Review:  Imaging Results (Last 72 Hours)     ** No results found for the last 72 hours. **           I reviewed the patient's new clinical results.  I reviewed the patient's new imaging results and agree with the interpretation.     ASSESSMENT/PLAN:   ASSESSMENT:  1.  Dyspepsia  2.  Hiatal hernia    PLAN:  1.  Esophagogastroduodenoscopy with biopsy and culture    Risk and benefits associated with the procedure are reviewed with the patient.  The patient wished to proceed     Eladio Esquivel DO  03/18/21  10:48 CDT

## 2021-03-18 NOTE — ANESTHESIA PREPROCEDURE EVALUATION
Anesthesia Evaluation     Patient summary reviewed and Nursing notes reviewed   history of anesthetic complications: PONV  NPO Solid Status: > 8 hours  NPO Liquid Status: > 2 hours           Airway   Mallampati: II  TM distance: >3 FB  Neck ROM: full  Possible difficult intubation  Dental    (+) upper dentures and lower dentures    Pulmonary    (+) shortness of breath, decreased breath sounds,   Cardiovascular - normal exam    (+) hypertension well controlled less than 2 medications, valvular problems/murmurs MR, CAD, hyperlipidemia,     ROS comment: DAHIANA 2-1-17:    · Mild-to-moderate mitral valve regurgitation is present  · Left ventricular wall thickness is consistent with mild concentric hypertrophy.  · Mild pulmonic valve regurgitation is present.  · Estimated EF = 66%.  · Left atrial cavity size is mild-to-moderately dilated.    Neuro/Psych  (+) psychiatric history Depression,     GI/Hepatic/Renal/Endo    (+)  PUD, GI bleeding ,     Musculoskeletal     Abdominal   (+) obese,    Substance History - negative use     OB/GYN negative ob/gyn ROS         Other   arthritis, blood dyscrasia,                       Anesthesia Plan    ASA 3     MAC       Anesthetic plan, all risks, benefits, and alternatives have been provided, discussed and informed consent has been obtained with: patient.

## 2021-03-20 LAB
BACTERIA SPEC AEROBE CULT: ABNORMAL
BACTERIA SPEC AEROBE CULT: ABNORMAL

## 2021-03-22 ENCOUNTER — OFFICE VISIT (OUTPATIENT)
Dept: SURGERY | Facility: CLINIC | Age: 71
End: 2021-03-22

## 2021-03-22 VITALS
TEMPERATURE: 97.2 F | SYSTOLIC BLOOD PRESSURE: 118 MMHG | BODY MASS INDEX: 25.27 KG/M2 | WEIGHT: 148 LBS | HEIGHT: 64 IN | HEART RATE: 61 BPM | DIASTOLIC BLOOD PRESSURE: 66 MMHG

## 2021-03-22 DIAGNOSIS — K80.20 GALLSTONES: Primary | ICD-10-CM

## 2021-03-22 LAB
LAB AP CASE REPORT: NORMAL
PATH REPORT.FINAL DX SPEC: NORMAL

## 2021-03-22 PROCEDURE — 99203 OFFICE O/P NEW LOW 30 MIN: CPT | Performed by: SURGERY

## 2021-03-22 RX ORDER — SODIUM CHLORIDE 0.9 % (FLUSH) 0.9 %
3 SYRINGE (ML) INJECTION EVERY 12 HOURS SCHEDULED
Status: CANCELLED | OUTPATIENT
Start: 2021-03-30

## 2021-03-22 RX ORDER — SODIUM CHLORIDE 0.9 % (FLUSH) 0.9 %
10 SYRINGE (ML) INJECTION AS NEEDED
Status: CANCELLED | OUTPATIENT
Start: 2021-03-30

## 2021-03-22 RX ORDER — ACETAMINOPHEN 325 MG/1
650 TABLET ORAL ONCE
Status: CANCELLED | OUTPATIENT
Start: 2021-03-30 | End: 2021-03-22

## 2021-03-22 RX ORDER — BUPIVACAINE HCL/0.9 % NACL/PF 0.1 %
2 PLASTIC BAG, INJECTION (ML) EPIDURAL ONCE
Status: CANCELLED | OUTPATIENT
Start: 2021-03-30 | End: 2021-03-22

## 2021-03-22 RX ORDER — ENALAPRIL MALEATE 10 MG/1
1 TABLET ORAL DAILY PRN
COMMUNITY
Start: 2021-02-24 | End: 2022-03-04

## 2021-03-22 RX ORDER — UBIDECARENONE 200 MG
1 CAPSULE ORAL DAILY
COMMUNITY
Start: 2021-02-24

## 2021-03-22 RX ORDER — CHLORAL HYDRATE 500 MG
1000 CAPSULE ORAL DAILY
COMMUNITY
Start: 2021-02-24

## 2021-03-22 RX ORDER — TEMAZEPAM 30 MG/1
30 CAPSULE ORAL NIGHTLY
COMMUNITY
Start: 2021-02-17

## 2021-03-22 RX ORDER — SODIUM CHLORIDE, SODIUM LACTATE, POTASSIUM CHLORIDE, CALCIUM CHLORIDE 600; 310; 30; 20 MG/100ML; MG/100ML; MG/100ML; MG/100ML
100 INJECTION, SOLUTION INTRAVENOUS CONTINUOUS
Status: CANCELLED | OUTPATIENT
Start: 2021-03-30

## 2021-03-22 NOTE — PROGRESS NOTES
Chief Complaint   Patient presents with   • Abdominal Pain     Possible  Gallbladder problems        HPI  70-year-old woman with intermittent epigastric and right upper quadrant abdominal pain and history of pancreatitis.  Although she has a negative HIDA scan, she has a positive ultrasound that demonstrates the presence of a gallstone.  She is therefore considered for laparoscopic cholecystectomy.  Her primary symptom is pain and it is present in the right upper quadrant.  It is not necessarily related to eating or activity.  Past Medical History:   Diagnosis Date   • Anemia    • Arthritis    • Coronary atherosclerosis of native coronary artery    • Depression    • Hearing loss of both ears     can hear nothing out of left ear and has right ear loss as well   • Hyperlipidemia    • Hypertension    • Meniere disease    • Nonrheumatic mitral valve disorder    • Pancreatitis    • PONV (postoperative nausea and vomiting)    • Precordial pain    • Preprocedural cardiovascular examination    • Sleep apnea    • Stomach ulcer    • Suicide attempt by drug ingestion (CMS/[facility]) 1998   • Transfusion history        Past Surgical History:   Procedure Laterality Date   • CARDIAC CATHETERIZATION     • COLONOSCOPY     • ECHO - CONVERTED  03/02/2014    Normal LV systolic function with EF of 60%. Early diastolic dysfunction. Left atrium moderately dilated. Normal RV size and function. Trace mitral and mild tricuspid regurg. No intracardiac mass, pericardial effusion or cardiac thrombus seen   • ENDOSCOPY N/A 3/27/2017    Procedure: ESOPHAGOGASTRODUODENOSCOPY WITH CONTROL OF BLEED;  Surgeon: Eladio Esquivel DO;  Location: [facility] ENDOSCOPY;  Service:    • ENDOSCOPY N/A 7/24/2018    Procedure: ESOPHAGOGASTRODUODENOSCOPY;  Surgeon: Eladio Esquivel DO;  Location: [facility] ENDOSCOPY;  Service: Gastroenterology   • ENDOSCOPY N/A 3/18/2021    Procedure: ESOPHAGOGASTRODUODENOSCOPY;  Surgeon: Eladio Esquivel DO;  Location: [facility] ENDOSCOPY;   Service: Gastroenterology;  Laterality: N/A;   • HYSTERECTOMY     • JOINT REPLACEMENT     • KNEE SURGERY Bilateral 2015    bilateral knee replacements   • TUBAL ABDOMINAL LIGATION  1973   • TUMOR REMOVAL Left 1970    fatty tumor removed from left breast   • TUMOR REMOVAL Right 1971    fatty tumor removal from the right shoulder         Current Outpatient Medications:   •  amLODIPine (NORVASC) 10 MG tablet, Take 1 tablet by mouth Daily., Disp: 30 tablet, Rfl: 11  •  ascorbic acid (VITAMIN C) 1000 MG tablet, Take 1 capsule by mouth Daily., Disp: , Rfl:   •  aspirin 81 MG chewable tablet, Chew 81 mg Daily., Disp: , Rfl:   •  carvedilol (COREG) 3.125 MG tablet, Take 1 tablet by mouth 2 (Two) Times a Day., Disp: 180 tablet, Rfl: 3  •  Coenzyme Q10 200 MG capsule, Take 1 tablet by mouth Daily., Disp: , Rfl:   •  enalapril (VASOTEC) 10 MG tablet, Take 1 tablet by mouth Daily., Disp: , Rfl:   •  FLUoxetine (PROzac) 20 MG capsule, Take 20 mg by mouth Daily., Disp: , Rfl:   •  gabapentin (NEURONTIN) 800 MG tablet, Take 800 mg by mouth 2 (Two) Times a Day. Patient takes .5 tab in the morning and 1 tablet at night, Disp: , Rfl:   •  gemfibrozil (LOPID) 600 MG tablet, Take 600 mg by mouth 2 (Two) Times a Day Before Meals., Disp: , Rfl:   •  hydrochlorothiazide (MICROZIDE) 12.5 MG capsule, Take 12.5 mg by mouth Daily., Disp: , Rfl:   •  Magnesium Hydroxide (MAGNESIA PO), Take 250 mg by mouth Daily., Disp: , Rfl:   •  meloxicam (MOBIC) 15 MG tablet, TAKE 1 TABLET EVERY DAY WITH A MEAL, Disp: 90 tablet, Rfl: 4  •  Omega-3 Fatty Acids (fish oil) 1000 MG capsule capsule, Daily., Disp: , Rfl:   •  pantoprazole (PROTONIX) 40 MG EC tablet, Take 40 mg by mouth 2 (Two) Times a Day., Disp: , Rfl:   •  Potassium 99 MG tablet, Take 1 tablet by mouth Every Night., Disp: , Rfl:   •  pravastatin (PRAVACHOL) 40 MG tablet, Take 40 mg by mouth Daily., Disp: , Rfl:   •  sucralfate (CARAFATE) 1 g tablet, Take 1 g by mouth 4 (Four) Times a Day.,  Disp: , Rfl:   •  temazepam (RESTORIL) 30 MG capsule, Every Night., Disp: , Rfl:   •  vitamin B-12 (CYANOCOBALAMIN) 1000 MCG tablet, Take 1,000 mcg by mouth Daily., Disp: , Rfl:   •  vitamin D (ERGOCALCIFEROL) 50161 UNITS capsule capsule, Take 50,000 Units by mouth 1 (One) Time Per Week., Disp: , Rfl:   •  enalapril (VASOTEC) 5 MG tablet, TAKE 1 TABLET EVERY DAY, Disp: 90 tablet, Rfl: 5  •  ranolazine (RANEXA) 500 MG 12 hr tablet, Take 1 tablet by mouth 2 (Two) Times a Day., Disp: 180 tablet, Rfl: 3  •  temazepam (RESTORIL) 15 MG capsule, Take 15 mg by mouth Every Night., Disp: , Rfl:     Allergies   Allergen Reactions   • Amoxicillin Rash   • Darvon [Propoxyphene] Rash       Family History   Problem Relation Age of Onset   • Heart disease Mother    • Cancer Mother         colon   • Early death Mother 68        colon cancer   • Hypertension Mother    • Hyperlipidemia Mother    • Heart disease Father    • Alcohol abuse Father    • Early death Father 56        heart attack   • Hypertension Father    • Hyperlipidemia Father    • Cancer Maternal Grandmother         stomach   • Cancer Maternal Grandfather    • Cancer Paternal Grandfather         stomach       Social History     Socioeconomic History   • Marital status:      Spouse name: Not on file   • Number of children: Not on file   • Years of education: Not on file   • Highest education level: Not on file   Tobacco Use   • Smoking status: Former Smoker     Quit date: 2006     Years since quitting: 15.2   • Smokeless tobacco: Never Used   Vaping Use   • Vaping Use: Never used   Substance and Sexual Activity   • Alcohol use: No   • Drug use: No   • Sexual activity: Defer       Review of Systems   Constitutional: Positive for appetite change.   HENT: Negative.    Eyes: Negative.    Respiratory: Positive for shortness of breath.    Cardiovascular: Negative.    Gastrointestinal: Positive for diarrhea and nausea.   Endocrine: Negative.    Genitourinary: Negative.     Musculoskeletal: Positive for back pain.        Leg pain, Arthritis   Skin: Negative.    Allergic/Immunologic: Negative.    Neurological: Negative.    Hematological: Bruises/bleeds easily.   Psychiatric/Behavioral: Negative.        Physical Exam  Vitals reviewed.   Constitutional:       General: She is not in acute distress.     Appearance: She is well-developed. She is not diaphoretic.   HENT:      Head: Normocephalic and atraumatic.      Nose: Nose normal.      Mouth/Throat:      Pharynx: No oropharyngeal exudate.   Eyes:      General: No scleral icterus.     Pupils: Pupils are equal, round, and reactive to light.   Neck:      Thyroid: No thyromegaly.      Vascular: No JVD.      Trachea: No tracheal deviation.   Cardiovascular:      Rate and Rhythm: Normal rate and regular rhythm.   Pulmonary:      Effort: Pulmonary effort is normal. No respiratory distress.      Breath sounds: No stridor. No wheezing or rales.   Chest:      Chest wall: No tenderness.      Breasts: Breasts are symmetrical.         Right: No inverted nipple, mass, nipple discharge, skin change or tenderness.         Left: No inverted nipple, mass, nipple discharge, skin change or tenderness.   Abdominal:      General: Bowel sounds are normal. There is no distension.      Palpations: Abdomen is soft. There is no mass.      Tenderness: There is abdominal tenderness ( Right upper quadrant abdominal pain). There is no guarding or rebound.      Hernia: No hernia is present.   Musculoskeletal:         General: No tenderness or deformity. Normal range of motion.      Cervical back: Normal range of motion and neck supple.   Lymphadenopathy:      Cervical: No cervical adenopathy.   Skin:     General: Skin is warm and dry.      Coloration: Skin is not pale.      Findings: No erythema or rash.   Neurological:      General: No focal deficit present.      Mental Status: She is alert and oriented to person, place, and time.   Psychiatric:         Mood and  Affect: Mood normal.         Behavior: Behavior normal.         Thought Content: Thought content normal.         Judgment: Judgment normal.           ASSESSMENT    Diagnoses and all orders for this visit:    1. Gallstones (Primary)  -     Comprehensive Metabolic Panel; Future  -     ECG 12 Lead; Future  -     lactated ringers infusion  -     ceFAZolin (ANCEF) 2 g in sodium chloride 0.9 % 100 mL IVPB  -     acetaminophen (TYLENOL) tablet 650 mg  -     sodium chloride 0.9 % flush 3 mL  -     sodium chloride 0.9 % flush 10 mL  -     Case Request; Standing  -     Case Request    Other orders  -     Follow anesthesia standing orders.  -     Provide NPO Instructions to Patient; Future  -     Chlorhexidine Skin Prep; Future  -     Follow anesthesia standing orders.; Standing  -     Insert Peripheral IV; Standing  -     Saline Lock & Maintain IV Access; Standing  -     Verify NPO Status; Standing  -     Obtain informed consent; Standing  -     SCD (sequential compression device)- to be placed on patient in Pre-op; Standing  -     Verify / Perform Chlorhexidine Skin Prep; Standing        PLAN    1.  Laparoscopic possible open cholecystectomy with cholangiogram is planned.    The following were discussed with the patient/family:    What are the indications that have led your doctor to the opinion that an operation is necessary?    * Symptoms and studies indicate that there is gallbladder disease causing pain the abdomen.    What, if any, alternative treatments are available for your condition?    * Watching and waiting with no surgery  * Removing the gallbladder through one large incision made in the abdomen.    What will be the likely result if you don't have the operation?    * Pain, infection, inflammation, and/or stones may continue or get worse    What are the basic procedures involved in the operation?    * The surgery may be done laparoscopically. Laparoscopic surgery is done using a scope and hollow tube(s) called  ports. These are inserted through small cuts in the abdomen. A scope is a thin, lighted instrument with a camera attached. Tools are passed through the ports. Carbon dioxide gas is pumped into the abdomen to create workspace.   If the surgery cannot be performed with a scope, it may be done through a larger cut. This occurs 2% of the time.  The gall bladder will be removed after it is  from the liver, bile duct, and surrounding arteries. An x-ray of the bile ducts is usually performed with contrast dye injected into the ducts.  If stones are found, another procedure may be required to remove them.  A drain may rarely be inserted to keep fluid from building up in the treatment area.     What are the risks?    * Exposure to radiation. Pregnant women and women of childbearing age should talk with their doctor about this.  * Pain, numbness, swelling, weakness or scarring where tissue is cut.  * The gas used in laparoscopic procedures to inflate the abdomen can become trapped in tissues. Gas in the bloodstream can dangerously affect blood flow and  heart function.  * The procedure may not cure or relieve your condition or symptoms. They may come back and even worsen.  * You may need additional tests or treatment.  * Bleeding. You may need blood transfusions or other treatments. This may be discovered during the procedure, or later.  * Embolism. An embolism is an object that moves through your body in your bloodstream. It can be an air bubble, a blood clot, a piece of fat or other material. It can block a blood vessel. This can lead to stroke, pulmonary embolism (blockage of the main artery of the lung), or injury to organs or extremities.  * Reactions to dye used for imaging. These may include hives, swelling of the face and/or throat, difficulty breathing, and kidney failure.  * Retained stones in bile ducts.  * Wound infection, poor healing or reopening. Blood or clear fluid can also collect at the wound  site(s).  * Damage to the bile ducts or nearby structures. This may be discovered during the procedure, or later.  * Incisional hernia. Weak scar tissue may allow tissue to bulge through the cut.  * The instrument(s) placed in your abdomen can cause injuries to nearby structures.  * Death.    How is the operation expected to improve your health or quality of life?    * Operation is expected to decrease pain and nausea/vomiting associated with gallstones.  * This procedure may relieve or prevent infection, inflammation, and/or pain from stones or blockage of bile ducts.    Is hospitalization necessary and, if so, how long can you expect to be hospitalized?    * Most often, the operation is performed on an outpatient basis. Occasionally hospitalization is necessary for pain or nausea control    What can you expect during your recovery period?    * The gas used in laparoscopic procedures to inflate the abdomen can become trapped    in tissues. Shoulder pain may occur for a few days after surgery.   * Nausea and pain control are obtained with oral medication.  * If you are on metformin, it will need to be held for 48 hours after surgery    When can you expect to resume normal activities?    * Normal activity can be resumed in 10-14 days if the procedure is performed laparoscopically. Open operations require no lifting or straining for 6 weeks.     Are there likely to be residual effects from the operation?    * Diarrhea often occurs, mostly temporary. Occasionally there is still minor food intolerance.    All questions were answered. The patient agrees to operation.                    This document has been electronically signed by John Stratton MD on March 23, 2021 13:08 CDT

## 2021-03-22 NOTE — PATIENT INSTRUCTIONS
"BMI for Adults  What is BMI?  Body mass index (BMI) is a number that is calculated from a person's weight and height. BMI can help estimate how much of a person's weight is composed of fat. BMI does not measure body fat directly. Rather, it is an alternative to procedures that directly measure body fat, which can be difficult and expensive.  BMI can help identify people who may be at higher risk for certain medical problems.  What are BMI measurements used for?  BMI is used as a screening tool to identify possible weight problems. It helps determine whether a person is obese, overweight, a healthy weight, or underweight.  BMI is useful for:  · Identifying a weight problem that may be related to a medical condition or may increase the risk for medical problems.  · Promoting changes, such as changes in diet and exercise, to help reach a healthy weight. BMI screening can be repeated to see if these changes are working.  How is BMI calculated?  BMI involves measuring your weight in relation to your height. Both height and weight are measured, and the BMI is calculated from those numbers. This can be done either in English (U[place] or metric measurements. Note that charts and online BMI calculators are available to help you find your BMI quickly and easily without having to do these calculations yourself.  To calculate your BMI in English ([place] measurements:    1. Measure your weight in pounds (lb).  2. Multiply the number of pounds by 703.  ? For example, for a person who weighs 180 lb, multiply that number by 703, which equals 126,540.  3. Measure your height in inches. Then multiply that number by itself to get a measurement called \"inches squared.\"  ? For example, for a person who is 70 inches tall, the \"inches squared\" measurement is 70 inches x 70 inches, which equals 4,900 inches squared.  4. Divide the total from step 2 (number of lb x 703) by the total from step 3 (inches squared): 126,540 ÷ 4,900 = 25.8. This is " "your BMI.  To calculate your BMI in metric measurements:  1. Measure your weight in kilograms (kg).  2. Measure your height in meters (m). Then multiply that number by itself to get a measurement called \"meters squared.\"  ? For example, for a person who is 1.75 m tall, the \"meters squared\" measurement is 1.75 m x 1.75 m, which is equal to 3.1 meters squared.  3. Divide the number of kilograms (your weight) by the meters squared number. In this example: 70 ÷ 3.1 = 22.6. This is your BMI.  What do the results mean?  BMI charts are used to identify whether you are underweight, normal weight, overweight, or obese. The following guidelines will be used:  · Underweight: BMI less than 18.5.  · Normal weight: BMI between 18.5 and 24.9.  · Overweight: BMI between 25 and 29.9.  · Obese: BMI of 30 or above.  Keep these notes in mind:  · Weight includes both fat and muscle, so someone with a muscular build, such as an athlete, may have a BMI that is higher than 24.9. In cases like these, BMI is not an accurate measure of body fat.  · To determine if excess body fat is the cause of a BMI of 25 or higher, further assessments may need to be done by a health care provider.  · BMI is usually interpreted in the same way for men and women.  Where to find more information  For more information about BMI, including tools to quickly calculate your BMI, go to these websites:  · Centers for Disease Control and Prevention: www.cdc.gov  · American Heart Association: www.heart.org  · National Heart, Lung, and Blood [place]: www.nhlbi.nih.gov  Summary  · Body mass index (BMI) is a number that is calculated from a person's weight and height.  · BMI may help estimate how much of a person's weight is composed of fat. BMI can help identify those who may be at higher risk for certain medical problems.  · BMI can be measured using English measurements or metric measurements.  · BMI charts are used to identify whether you are underweight, normal " weight, overweight, or obese.  This information is not intended to replace advice given to you by your health care provider. Make sure you discuss any questions you have with your health care provider.  Document Revised: 09/09/2020 Document Reviewed: 07/17/2020  Elsevier Patient Education © 2021 [employer]

## 2021-03-25 ENCOUNTER — APPOINTMENT (OUTPATIENT)
Dept: PREADMISSION TESTING | Facility: HOSPITAL | Age: 71
End: 2021-03-25

## 2021-03-25 VITALS
HEIGHT: 64 IN | WEIGHT: 148 LBS | HEART RATE: 60 BPM | RESPIRATION RATE: 18 BRPM | BODY MASS INDEX: 25.27 KG/M2 | OXYGEN SATURATION: 95 % | DIASTOLIC BLOOD PRESSURE: 82 MMHG | SYSTOLIC BLOOD PRESSURE: 148 MMHG

## 2021-03-25 DIAGNOSIS — K80.20 GALLSTONES: ICD-10-CM

## 2021-03-25 LAB
ALBUMIN SERPL-MCNC: 4.2 G/DL (ref 3.5–5.2)
ALBUMIN/GLOB SERPL: 2 G/DL
ALP SERPL-CCNC: 62 U/L (ref 39–117)
ALT SERPL W P-5'-P-CCNC: 12 U/L (ref 1–33)
ANION GAP SERPL CALCULATED.3IONS-SCNC: 7 MMOL/L (ref 5–15)
AST SERPL-CCNC: 16 U/L (ref 1–32)
BILIRUB SERPL-MCNC: 0.4 MG/DL (ref 0–1.2)
BUN SERPL-MCNC: 8 MG/DL (ref 8–23)
BUN/CREAT SERPL: 8.7 (ref 7–25)
CALCIUM SPEC-SCNC: 9.6 MG/DL (ref 8.6–10.5)
CHLORIDE SERPL-SCNC: 102 MMOL/L (ref 98–107)
CO2 SERPL-SCNC: 26 MMOL/L (ref 22–29)
CREAT SERPL-MCNC: 0.92 MG/DL (ref 0.57–1)
GFR SERPL CREATININE-BSD FRML MDRD: 60 ML/MIN/1.73
GLOBULIN UR ELPH-MCNC: 2.1 GM/DL
GLUCOSE SERPL-MCNC: 67 MG/DL (ref 65–99)
POTASSIUM SERPL-SCNC: 4.4 MMOL/L (ref 3.5–5.2)
PROT SERPL-MCNC: 6.3 G/DL (ref 6–8.5)
SODIUM SERPL-SCNC: 135 MMOL/L (ref 136–145)

## 2021-03-25 PROCEDURE — 36415 COLL VENOUS BLD VENIPUNCTURE: CPT

## 2021-03-25 PROCEDURE — 93005 ELECTROCARDIOGRAM TRACING: CPT

## 2021-03-25 PROCEDURE — 80053 COMPREHEN METABOLIC PANEL: CPT

## 2021-03-25 PROCEDURE — 93010 ELECTROCARDIOGRAM REPORT: CPT | Performed by: INTERNAL MEDICINE

## 2021-03-25 RX ORDER — ENALAPRIL MALEATE 5 MG/1
5 TABLET ORAL DAILY
COMMUNITY
End: 2021-06-15

## 2021-03-25 RX ORDER — MELOXICAM 15 MG/1
15 TABLET ORAL DAILY
COMMUNITY

## 2021-03-25 NOTE — DISCHARGE INSTRUCTIONS
[facility]  Pre-op Information and Guidelines    You will be called after 2 p.m. the day before your surgery (Friday for Monday surgery) and notified of your time for arrival and approximate surgery time.  If you have not received a call by 4P.M., please contact Same Day Surgery at [phone] of if outside [facility] call [phone].    Please Follow these Important Safety Guidelines:    • The morning of your procedure, take only the medications listed below with   A sip of water:_____________________________________________       ______________________________________________    • DO NOT eat or drink anything after 12:00 midnight the night before surgery  Specific instructions concerning drinking clear liquids will be discussed during  the pre-surgery instruction call the day before your surgery.    • If you take a blood thinner (ex. Plavix, Coumadin, aspirin), ask your doctor when to stop it before surgery  STOP DATE: _________________    • Only 2 visitors are allowed in patient rooms at a time  Your visitors will be asked to wait in the lobby until the admission process is complete with the exception of a parent with a child and patients in need of special assistance.    • YOU CANNOT DRIVE YOURSELF HOME  You must be accompanied by someone who will be responsible for driving you home after surgery and for your care at home.    • DO NOT chew gum, use breath mints, hard candy, or smoke the day of surgery  • DO NOT drink alcohol for at least 24 hours before your surgery  • DO NOT wear any jewelry and remove all body piercing before coming to the hospital  • DO NOT wear make-up to the hospital  • If you are having surgery on an extremity (arm/leg/foot) remove nail polish/artificial nails on the surgical side  • Clothing, glasses, contacts, dentures, and hairpieces must be removed before surgery  • Bathe the night before or the morning of your surgery and do not use powders/lotions on  skin.

## 2021-03-26 LAB
QT INTERVAL: 454 MS
QTC INTERVAL: 445 MS

## 2021-03-27 ENCOUNTER — LAB (OUTPATIENT)
Dept: LAB | Facility: HOSPITAL | Age: 71
End: 2021-03-27

## 2021-03-27 DIAGNOSIS — Z01.818 PREOP TESTING: ICD-10-CM

## 2021-03-27 LAB — SARS-COV-2 N GENE RESP QL NAA+PROBE: NOT DETECTED

## 2021-03-27 PROCEDURE — 87635 SARS-COV-2 COVID-19 AMP PRB: CPT

## 2021-03-27 PROCEDURE — C9803 HOPD COVID-19 SPEC COLLECT: HCPCS

## 2021-03-29 ENCOUNTER — ANESTHESIA EVENT (OUTPATIENT)
Dept: PERIOP | Facility: HOSPITAL | Age: 71
End: 2021-03-29

## 2021-03-30 ENCOUNTER — HOSPITAL ENCOUNTER (OUTPATIENT)
Facility: HOSPITAL | Age: 71
Setting detail: HOSPITAL OUTPATIENT SURGERY
Discharge: HOME | End: 2021-03-30
Attending: SURGERY | Admitting: SURGERY

## 2021-03-30 ENCOUNTER — APPOINTMENT (OUTPATIENT)
Dept: GENERAL RADIOLOGY | Facility: HOSPITAL | Age: 71
End: 2021-03-30

## 2021-03-30 ENCOUNTER — ANESTHESIA (OUTPATIENT)
Dept: PERIOP | Facility: HOSPITAL | Age: 71
End: 2021-03-30

## 2021-03-30 VITALS
DIASTOLIC BLOOD PRESSURE: 56 MMHG | HEIGHT: 64 IN | TEMPERATURE: 96.8 F | SYSTOLIC BLOOD PRESSURE: 114 MMHG | WEIGHT: 145.5 LBS | OXYGEN SATURATION: 99 % | HEART RATE: 59 BPM | BODY MASS INDEX: 24.84 KG/M2 | RESPIRATION RATE: 18 BRPM

## 2021-03-30 DIAGNOSIS — K80.20 GALLSTONES: Primary | ICD-10-CM

## 2021-03-30 PROCEDURE — 25010000002 DEXAMETHASONE PER 1 MG: Performed by: NURSE ANESTHETIST, CERTIFIED REGISTERED

## 2021-03-30 PROCEDURE — 88304 TISSUE EXAM BY PATHOLOGIST: CPT

## 2021-03-30 PROCEDURE — 25010000002 CEFAZOLIN PER 500 MG: Performed by: SURGERY

## 2021-03-30 PROCEDURE — 25010000002 ONDANSETRON PER 1 MG: Performed by: NURSE ANESTHETIST, CERTIFIED REGISTERED

## 2021-03-30 PROCEDURE — 25010000002 PHENYLEPHRINE 10 MG/ML SOLUTION: Performed by: NURSE ANESTHETIST, CERTIFIED REGISTERED

## 2021-03-30 PROCEDURE — 25010000002 NEOSTIGMINE 10 MG/10ML SOLUTION: Performed by: NURSE ANESTHETIST, CERTIFIED REGISTERED

## 2021-03-30 PROCEDURE — 25010000002 PROPOFOL 10 MG/ML EMULSION: Performed by: NURSE ANESTHETIST, CERTIFIED REGISTERED

## 2021-03-30 PROCEDURE — 25010000002 IOPAMIDOL 61 % SOLUTION: Performed by: SURGERY

## 2021-03-30 PROCEDURE — 47563 LAPARO CHOLECYSTECTOMY/GRAPH: CPT | Performed by: SURGERY

## 2021-03-30 PROCEDURE — 76000 FLUOROSCOPY <1 HR PHYS/QHP: CPT

## 2021-03-30 PROCEDURE — 25010000002 FENTANYL CITRATE (PF) 100 MCG/2ML SOLUTION: Performed by: NURSE ANESTHETIST, CERTIFIED REGISTERED

## 2021-03-30 PROCEDURE — 25010000002 MIDAZOLAM PER 1 MG: Performed by: NURSE ANESTHETIST, CERTIFIED REGISTERED

## 2021-03-30 DEVICE — LIGAMAX 5 MM ENDOSCOPIC MULTIPLE CLIP APPLIER
Type: IMPLANTABLE DEVICE | Site: ABDOMEN | Status: FUNCTIONAL
Brand: LIGAMAX

## 2021-03-30 RX ORDER — HYDROCODONE BITARTRATE AND ACETAMINOPHEN 5; 325 MG/1; MG/1
1 TABLET ORAL EVERY 4 HOURS PRN
Qty: 18 TABLET | Refills: 0 | Status: SHIPPED | OUTPATIENT
Start: 2021-03-30 | End: 2022-09-13

## 2021-03-30 RX ORDER — NALOXONE HCL 0.4 MG/ML
0.4 VIAL (ML) INJECTION AS NEEDED
Status: DISCONTINUED | OUTPATIENT
Start: 2021-03-30 | End: 2021-03-30 | Stop reason: HOSPADM

## 2021-03-30 RX ORDER — BUPIVACAINE HCL/0.9 % NACL/PF 0.1 %
2 PLASTIC BAG, INJECTION (ML) EPIDURAL ONCE
Status: COMPLETED | OUTPATIENT
Start: 2021-03-30 | End: 2021-03-30

## 2021-03-30 RX ORDER — NEOSTIGMINE METHYLSULFATE 1 MG/ML
INJECTION, SOLUTION INTRAVENOUS AS NEEDED
Status: DISCONTINUED | OUTPATIENT
Start: 2021-03-30 | End: 2021-03-30 | Stop reason: SURG

## 2021-03-30 RX ORDER — 0.9 % SODIUM CHLORIDE 0.9 %
VIAL (ML) INJECTION AS NEEDED
Status: DISCONTINUED | OUTPATIENT
Start: 2021-03-30 | End: 2021-03-30 | Stop reason: HOSPADM

## 2021-03-30 RX ORDER — ONDANSETRON 2 MG/ML
INJECTION INTRAMUSCULAR; INTRAVENOUS AS NEEDED
Status: DISCONTINUED | OUTPATIENT
Start: 2021-03-30 | End: 2021-03-30 | Stop reason: SURG

## 2021-03-30 RX ORDER — SODIUM CHLORIDE 0.9 % (FLUSH) 0.9 %
3 SYRINGE (ML) INJECTION EVERY 12 HOURS SCHEDULED
Status: DISCONTINUED | OUTPATIENT
Start: 2021-03-30 | End: 2021-03-30 | Stop reason: HOSPADM

## 2021-03-30 RX ORDER — BUPIVACAINE HYDROCHLORIDE 5 MG/ML
INJECTION, SOLUTION EPIDURAL; INTRACAUDAL AS NEEDED
Status: DISCONTINUED | OUTPATIENT
Start: 2021-03-30 | End: 2021-03-30 | Stop reason: HOSPADM

## 2021-03-30 RX ORDER — PROMETHAZINE HYDROCHLORIDE 25 MG/1
25 SUPPOSITORY RECTAL ONCE AS NEEDED
Status: DISCONTINUED | OUTPATIENT
Start: 2021-03-30 | End: 2021-03-30 | Stop reason: HOSPADM

## 2021-03-30 RX ORDER — ONDANSETRON 2 MG/ML
4 INJECTION INTRAMUSCULAR; INTRAVENOUS ONCE AS NEEDED
Status: DISCONTINUED | OUTPATIENT
Start: 2021-03-30 | End: 2021-03-30 | Stop reason: HOSPADM

## 2021-03-30 RX ORDER — PROPOFOL 10 MG/ML
VIAL (ML) INTRAVENOUS AS NEEDED
Status: DISCONTINUED | OUTPATIENT
Start: 2021-03-30 | End: 2021-03-30 | Stop reason: SURG

## 2021-03-30 RX ORDER — DEXAMETHASONE SODIUM PHOSPHATE 4 MG/ML
INJECTION, SOLUTION INTRA-ARTICULAR; INTRALESIONAL; INTRAMUSCULAR; INTRAVENOUS; SOFT TISSUE AS NEEDED
Status: DISCONTINUED | OUTPATIENT
Start: 2021-03-30 | End: 2021-03-30 | Stop reason: SURG

## 2021-03-30 RX ORDER — ACETAMINOPHEN 650 MG/1
650 SUPPOSITORY RECTAL ONCE AS NEEDED
Status: DISCONTINUED | OUTPATIENT
Start: 2021-03-30 | End: 2021-03-30 | Stop reason: HOSPADM

## 2021-03-30 RX ORDER — SODIUM CHLORIDE, SODIUM LACTATE, POTASSIUM CHLORIDE, CALCIUM CHLORIDE 600; 310; 30; 20 MG/100ML; MG/100ML; MG/100ML; MG/100ML
100 INJECTION, SOLUTION INTRAVENOUS CONTINUOUS
Status: DISCONTINUED | OUTPATIENT
Start: 2021-03-30 | End: 2021-03-30 | Stop reason: HOSPADM

## 2021-03-30 RX ORDER — LIDOCAINE HYDROCHLORIDE 20 MG/ML
INJECTION, SOLUTION INFILTRATION; PERINEURAL AS NEEDED
Status: DISCONTINUED | OUTPATIENT
Start: 2021-03-30 | End: 2021-03-30 | Stop reason: SURG

## 2021-03-30 RX ORDER — ACETAMINOPHEN 325 MG/1
650 TABLET ORAL ONCE AS NEEDED
Status: DISCONTINUED | OUTPATIENT
Start: 2021-03-30 | End: 2021-03-30 | Stop reason: HOSPADM

## 2021-03-30 RX ORDER — FENTANYL CITRATE 50 UG/ML
INJECTION, SOLUTION INTRAMUSCULAR; INTRAVENOUS AS NEEDED
Status: DISCONTINUED | OUTPATIENT
Start: 2021-03-30 | End: 2021-03-30 | Stop reason: SURG

## 2021-03-30 RX ORDER — VECURONIUM BROMIDE 1 MG/ML
INJECTION, POWDER, LYOPHILIZED, FOR SOLUTION INTRAVENOUS AS NEEDED
Status: DISCONTINUED | OUTPATIENT
Start: 2021-03-30 | End: 2021-03-30 | Stop reason: SURG

## 2021-03-30 RX ORDER — SODIUM CHLORIDE 0.9 % (FLUSH) 0.9 %
10 SYRINGE (ML) INJECTION AS NEEDED
Status: DISCONTINUED | OUTPATIENT
Start: 2021-03-30 | End: 2021-03-30 | Stop reason: HOSPADM

## 2021-03-30 RX ORDER — FLUMAZENIL 0.1 MG/ML
0.2 INJECTION INTRAVENOUS AS NEEDED
Status: DISCONTINUED | OUTPATIENT
Start: 2021-03-30 | End: 2021-03-30 | Stop reason: HOSPADM

## 2021-03-30 RX ORDER — EPHEDRINE SULFATE 50 MG/ML
5 INJECTION, SOLUTION INTRAVENOUS ONCE AS NEEDED
Status: DISCONTINUED | OUTPATIENT
Start: 2021-03-30 | End: 2021-03-30 | Stop reason: HOSPADM

## 2021-03-30 RX ORDER — SODIUM CHLORIDE, SODIUM GLUCONATE, SODIUM ACETATE, POTASSIUM CHLORIDE AND MAGNESIUM CHLORIDE 526; 502; 368; 37; 30 MG/100ML; MG/100ML; MG/100ML; MG/100ML; MG/100ML
INJECTION, SOLUTION INTRAVENOUS CONTINUOUS PRN
Status: DISCONTINUED | OUTPATIENT
Start: 2021-03-30 | End: 2021-03-30 | Stop reason: SURG

## 2021-03-30 RX ORDER — PHENYLEPHRINE HYDROCHLORIDE 10 MG/ML
INJECTION INTRAVENOUS AS NEEDED
Status: DISCONTINUED | OUTPATIENT
Start: 2021-03-30 | End: 2021-03-30 | Stop reason: SURG

## 2021-03-30 RX ORDER — PROMETHAZINE HYDROCHLORIDE 25 MG/1
25 TABLET ORAL ONCE AS NEEDED
Status: DISCONTINUED | OUTPATIENT
Start: 2021-03-30 | End: 2021-03-30 | Stop reason: HOSPADM

## 2021-03-30 RX ORDER — HYDROCODONE BITARTRATE AND ACETAMINOPHEN 5; 325 MG/1; MG/1
1 TABLET ORAL EVERY 6 HOURS PRN
Status: DISCONTINUED | OUTPATIENT
Start: 2021-03-30 | End: 2021-03-30 | Stop reason: HOSPADM

## 2021-03-30 RX ORDER — ACETAMINOPHEN 325 MG/1
650 TABLET ORAL ONCE
Status: COMPLETED | OUTPATIENT
Start: 2021-03-30 | End: 2021-03-30

## 2021-03-30 RX ORDER — SCOLOPAMINE TRANSDERMAL SYSTEM 1 MG/1
1 PATCH, EXTENDED RELEASE TRANSDERMAL CONTINUOUS
Status: DISCONTINUED | OUTPATIENT
Start: 2021-03-30 | End: 2021-03-30 | Stop reason: HOSPADM

## 2021-03-30 RX ORDER — MIDAZOLAM HYDROCHLORIDE 1 MG/ML
INJECTION INTRAMUSCULAR; INTRAVENOUS AS NEEDED
Status: DISCONTINUED | OUTPATIENT
Start: 2021-03-30 | End: 2021-03-30 | Stop reason: SURG

## 2021-03-30 RX ORDER — DIPHENHYDRAMINE HYDROCHLORIDE 50 MG/ML
12.5 INJECTION INTRAMUSCULAR; INTRAVENOUS
Status: DISCONTINUED | OUTPATIENT
Start: 2021-03-30 | End: 2021-03-30 | Stop reason: HOSPADM

## 2021-03-30 RX ORDER — EPHEDRINE SULFATE 50 MG/ML
INJECTION, SOLUTION INTRAVENOUS AS NEEDED
Status: DISCONTINUED | OUTPATIENT
Start: 2021-03-30 | End: 2021-03-30 | Stop reason: SURG

## 2021-03-30 RX ADMIN — EPHEDRINE SULFATE 10 MG: 50 INJECTION INTRAVENOUS at 07:30

## 2021-03-30 RX ADMIN — VECURONIUM BROMIDE 0.5 MG: 1 INJECTION, POWDER, LYOPHILIZED, FOR SOLUTION INTRAVENOUS at 07:20

## 2021-03-30 RX ADMIN — NEOSTIGMINE METHYLSULFATE 2 MG: 1 INJECTION, SOLUTION INTRAVENOUS at 08:26

## 2021-03-30 RX ADMIN — ONDANSETRON 4 MG: 2 INJECTION INTRAMUSCULAR; INTRAVENOUS at 08:14

## 2021-03-30 RX ADMIN — PHENYLEPHRINE HYDROCHLORIDE 100 MCG: 10 INJECTION INTRAVENOUS at 08:03

## 2021-03-30 RX ADMIN — EPHEDRINE SULFATE 10 MG: 50 INJECTION INTRAVENOUS at 07:42

## 2021-03-30 RX ADMIN — VECURONIUM BROMIDE 3.5 MG: 1 INJECTION, POWDER, LYOPHILIZED, FOR SOLUTION INTRAVENOUS at 07:35

## 2021-03-30 RX ADMIN — EPHEDRINE SULFATE 10 MG: 50 INJECTION INTRAVENOUS at 07:45

## 2021-03-30 RX ADMIN — LIDOCAINE HYDROCHLORIDE 20 MG: 20 INJECTION, SOLUTION INFILTRATION; PERINEURAL at 08:34

## 2021-03-30 RX ADMIN — EPHEDRINE SULFATE 10 MG: 50 INJECTION INTRAVENOUS at 07:25

## 2021-03-30 RX ADMIN — EPHEDRINE SULFATE 10 MG: 50 INJECTION INTRAVENOUS at 07:38

## 2021-03-30 RX ADMIN — SODIUM CHLORIDE, SODIUM GLUCONATE, SODIUM ACETATE, POTASSIUM CHLORIDE AND MAGNESIUM CHLORIDE: 526; 502; 368; 37; 30 INJECTION, SOLUTION INTRAVENOUS at 08:19

## 2021-03-30 RX ADMIN — PHENYLEPHRINE HYDROCHLORIDE 100 MCG: 10 INJECTION INTRAVENOUS at 08:14

## 2021-03-30 RX ADMIN — Medication 2 G: at 07:26

## 2021-03-30 RX ADMIN — VECURONIUM BROMIDE 1 MG: 1 INJECTION, POWDER, LYOPHILIZED, FOR SOLUTION INTRAVENOUS at 07:54

## 2021-03-30 RX ADMIN — DEXAMETHASONE SODIUM PHOSPHATE 4 MG: 4 INJECTION, SOLUTION INTRAMUSCULAR; INTRAVENOUS at 07:20

## 2021-03-30 RX ADMIN — GLYCOPYRROLATE 0.2 MG: 0.2 INJECTION, SOLUTION INTRAMUSCULAR; INTRAVITREAL at 07:25

## 2021-03-30 RX ADMIN — PHENYLEPHRINE HYDROCHLORIDE 100 MCG: 10 INJECTION INTRAVENOUS at 07:45

## 2021-03-30 RX ADMIN — FENTANYL CITRATE 25 MCG: 50 INJECTION INTRAMUSCULAR; INTRAVENOUS at 07:20

## 2021-03-30 RX ADMIN — GLYCOPYRROLATE 0.4 MG: 0.2 INJECTION, SOLUTION INTRAMUSCULAR; INTRAVITREAL at 08:26

## 2021-03-30 RX ADMIN — LIDOCAINE HYDROCHLORIDE 50 MG: 20 INJECTION, SOLUTION INFILTRATION; PERINEURAL at 07:20

## 2021-03-30 RX ADMIN — ACETAMINOPHEN 650 MG: 325 TABLET ORAL at 06:19

## 2021-03-30 RX ADMIN — EPHEDRINE SULFATE 10 MG: 50 INJECTION INTRAVENOUS at 08:14

## 2021-03-30 RX ADMIN — HYDROCODONE BITARTRATE AND ACETAMINOPHEN 1 TABLET: 5; 325 TABLET ORAL at 09:36

## 2021-03-30 RX ADMIN — MIDAZOLAM HYDROCHLORIDE 1 MG: 2 INJECTION, SOLUTION INTRAMUSCULAR; INTRAVENOUS at 07:07

## 2021-03-30 RX ADMIN — SCOPALAMINE 1 PATCH: 1 PATCH, EXTENDED RELEASE TRANSDERMAL at 06:45

## 2021-03-30 RX ADMIN — SODIUM CHLORIDE, POTASSIUM CHLORIDE, SODIUM LACTATE AND CALCIUM CHLORIDE 100 ML/HR: 600; 310; 30; 20 INJECTION, SOLUTION INTRAVENOUS at 06:19

## 2021-03-30 RX ADMIN — PROPOFOL 30 MG: 10 INJECTION, EMULSION INTRAVENOUS at 07:30

## 2021-03-30 RX ADMIN — PROPOFOL 120 MG: 10 INJECTION, EMULSION INTRAVENOUS at 07:20

## 2021-03-30 NOTE — ANESTHESIA PROCEDURE NOTES
Airway  Urgency: elective    Date/Time: 3/30/2021 7:22 AM  Airway not difficult    General Information and Staff    Patient location during procedure: OR  CRNA: Maxwell Read CRNA    Indications and Patient Condition  Indications for airway management: airway protection    Preoxygenated: yes  MILS maintained throughout  Mask difficulty assessment: 1 - vent by mask    Final Airway Details  Final airway type: endotracheal airway      Successful airway: ETT  Cuffed: yes   Successful intubation technique: direct laryngoscopy  Endotracheal tube insertion site: oral  Blade: Tinajero  Blade size: 2  ETT size (mm): 7.0  Cormack-Lehane Classification: grade I - full view of glottis  Placement verified by: chest auscultation   Measured from: gums  ETT/EBT to gums (cm): 20  Number of attempts at approach: 1  Assessment: lips, teeth, and gum same as pre-op and atraumatic intubation

## 2021-03-30 NOTE — ANESTHESIA PREPROCEDURE EVALUATION
Anesthesia Evaluation     Patient summary reviewed and Nursing notes reviewed   history of anesthetic complications: PONV  NPO Solid Status: > 8 hours  NPO Liquid Status: > 8 hours           Airway   Mallampati: II  TM distance: >3 FB  Neck ROM: full  Possible difficult intubation  Dental    (+) lower dentures and upper dentures    Pulmonary - normal exam    breath sounds clear to auscultation  (+) a smoker Former,   (-) rhonchi, decreased breath sounds, wheezes  Cardiovascular   Exercise tolerance: good (4-7 METS)    ECG reviewed  Patient on routine beta blocker and Beta blocker given within 24 hours of surgery  Rhythm: regular  Rate: normal    (+) hypertension well controlled 2 medications or greater, valvular problems/murmurs MR, CAD, dysrhythmias Bradycardia, hyperlipidemia,   (-) pacemaker, past MI, murmur, cardiac stents, CABG, DVT    ROS comment: TTE 2017:  · Mild-to-moderate mitral valve regurgitation is present  · Left ventricular wall thickness is consistent with mild concentric hypertrophy.  · Mild pulmonic valve regurgitation is present.  · Estimated EF = 66%.  · Left atrial cavity size is mild-to-moderately dilated.    Neuro/Psych  (+) psychiatric history Depression,     (-) seizures, TIA, CVA  GI/Hepatic/Renal/Endo    (+)  GERD well controlled,    (-)  obesity    Musculoskeletal     Abdominal  - normal exam  (-) obese   Substance History - negative use     OB/GYN negative ob/gyn ROS         Other   arthritis,      ROS/Med Hx Other: GERD controlled on daily protonix                  Anesthesia Plan    ASA 3     general     intravenous induction     Anesthetic plan, all risks, benefits, and alternatives have been provided, discussed and informed consent has been obtained with: patient and child.

## 2021-03-30 NOTE — ANESTHESIA POSTPROCEDURE EVALUATION
Patient: Fouzia Maldonado    Procedure Summary     Date: 03/30/21 Room / Location: [facility] OR 03 / [facility] OR    Anesthesia Start: 0713 Anesthesia Stop: 0849    Procedure: LAPAROSCOPIC  CHOLECYSTECTOMY WITH INTRAOPERATIVE CHOLANGIOGRAM (N/A Abdomen) Diagnosis:       Gallstones      (Gallstones [K80.20])    Surgeons: John Stratton MD Provider: Lulú Luke DO    Anesthesia Type: general ASA Status: 3          Anesthesia Type: general    Vitals  No vitals data found for the desired time range.          Post Anesthesia Care and Evaluation    Patient location during evaluation: PACU  Patient participation: complete - patient participated  Level of consciousness: awake and alert  Pain score: 0  Pain management: adequate  Airway patency: patent  Anesthetic complications: No anesthetic complications  PONV Status: none  Cardiovascular status: acceptable and hemodynamically stable  Respiratory status: acceptable, face mask and spontaneous ventilation  Hydration status: acceptable    Comments: -------------------------              03/30/21                    0610        -------------------------   BP:         145/68        Pulse:        54          Resp:         18          Temp:   96.8 °F (36 °C)   SpO2:        100%        -------------------------

## 2021-04-01 LAB
LAB AP CASE REPORT: NORMAL
PATH REPORT.FINAL DX SPEC: NORMAL

## 2021-04-06 ENCOUNTER — OFFICE VISIT (OUTPATIENT)
Dept: SURGERY | Facility: CLINIC | Age: 71
End: 2021-04-06

## 2021-04-06 VITALS
BODY MASS INDEX: 24.14 KG/M2 | DIASTOLIC BLOOD PRESSURE: 72 MMHG | WEIGHT: 141.4 LBS | TEMPERATURE: 97 F | SYSTOLIC BLOOD PRESSURE: 110 MMHG | HEIGHT: 64 IN | HEART RATE: 56 BPM

## 2021-04-06 DIAGNOSIS — Z90.49 S/P CHOLECYSTECTOMY: Primary | ICD-10-CM

## 2021-04-06 PROCEDURE — 99024 POSTOP FOLLOW-UP VISIT: CPT | Performed by: NURSE PRACTITIONER

## 2021-04-06 RX ORDER — OMEPRAZOLE 40 MG/1
CAPSULE, DELAYED RELEASE ORAL AS NEEDED
COMMUNITY
Start: 2021-03-30

## 2021-04-06 RX ORDER — ONDANSETRON 4 MG/1
4 TABLET, FILM COATED ORAL AS NEEDED
COMMUNITY
Start: 2021-03-23

## 2021-04-06 NOTE — PATIENT INSTRUCTIONS
"BMI for Adults  What is BMI?  Body mass index (BMI) is a number that is calculated from a person's weight and height. BMI can help estimate how much of a person's weight is composed of fat. BMI does not measure body fat directly. Rather, it is an alternative to procedures that directly measure body fat, which can be difficult and expensive.  BMI can help identify people who may be at higher risk for certain medical problems.  What are BMI measurements used for?  BMI is used as a screening tool to identify possible weight problems. It helps determine whether a person is obese, overweight, a healthy weight, or underweight.  BMI is useful for:  · Identifying a weight problem that may be related to a medical condition or may increase the risk for medical problems.  · Promoting changes, such as changes in diet and exercise, to help reach a healthy weight. BMI screening can be repeated to see if these changes are working.  How is BMI calculated?  BMI involves measuring your weight in relation to your height. Both height and weight are measured, and the BMI is calculated from those numbers. This can be done either in English (U.S.) or metric measurements. Note that charts and online BMI calculators are available to help you find your BMI quickly and easily without having to do these calculations yourself.  To calculate your BMI in English ([place].S.) measurements:    1. Measure your weight in pounds (lb).  2. Multiply the number of pounds by 703.  ? For example, for a person who weighs 180 lb, multiply that number by 703, which equals 126,540.  3. Measure your height in inches. Then multiply that number by itself to get a measurement called \"inches squared.\"  ? For example, for a person who is 70 inches tall, the \"inches squared\" measurement is 70 inches x 70 inches, which equals 4,900 inches squared.  4. Divide the total from step 2 (number of lb x 703) by the total from step 3 (inches squared): 126,540 ÷ 4,900 = 25.8. This is " "your BMI.  To calculate your BMI in metric measurements:  1. Measure your weight in kilograms (kg).  2. Measure your height in meters (m). Then multiply that number by itself to get a measurement called \"meters squared.\"  ? For example, for a person who is 1.75 m tall, the \"meters squared\" measurement is 1.75 m x 1.75 m, which is equal to 3.1 meters squared.  3. Divide the number of kilograms (your weight) by the meters squared number. In this example: 70 ÷ 3.1 = 22.6. This is your BMI.  What do the results mean?  BMI charts are used to identify whether you are underweight, normal weight, overweight, or obese. The following guidelines will be used:  · Underweight: BMI less than 18.5.  · Normal weight: BMI between 18.5 and 24.9.  · Overweight: BMI between 25 and 29.9.  · Obese: BMI of 30 or above.  Keep these notes in mind:  · Weight includes both fat and muscle, so someone with a muscular build, such as an athlete, may have a BMI that is higher than 24.9. In cases like these, BMI is not an accurate measure of body fat.  · To determine if excess body fat is the cause of a BMI of 25 or higher, further assessments may need to be done by a health care provider.  · BMI is usually interpreted in the same way for men and women.  Where to find more information  For more information about BMI, including tools to quickly calculate your BMI, go to these websites:  · Centers for Disease Control and Prevention: www.cdc.gov  · American Heart Association: www.heart.org  · National Heart, Lung, and Blood [place]: www.nhlbi.nih.gov  Summary  · Body mass index (BMI) is a number that is calculated from a person's weight and height.  · BMI may help estimate how much of a person's weight is composed of fat. BMI can help identify those who may be at higher risk for certain medical problems.  · BMI can be measured using English measurements or metric measurements.  · BMI charts are used to identify whether you are underweight, normal " weight, overweight, or obese.  This information is not intended to replace advice given to you by your health care provider. Make sure you discuss any questions you have with your health care provider.  Document Revised: 09/09/2020 Document Reviewed: 07/17/2020  Elsevier Patient Education © 2021 [employer].

## 2021-04-06 NOTE — PROGRESS NOTES
CHIEF COMPLAINT:   Chief Complaint   Patient presents with   • Post-op Follow-up     Post operative Laparoscopic Cholecystectomy 3-30-21       HPI: This patient presents for a post-operative visit after undergoing a laparocopic  Cholecystectomy with normal operative cholangiogram with Dr. Stratton.      Patient reports no problems. Eating better without any significant nausea. Having good bowel function. No problems with constipation or diarrhea. No urinary complaints. Denies fever. Ambulating well and slowly returning to normal activities.    PATHOLOGY:         PHYSICAL EXAM:    ABD: Incisions are healing well without any erythema or signs of infection.    ASSESSMENT:    Diagnoses and all orders for this visit:    1. S/P cholecystectomy (Primary)        PLAN:    1.The patient will follow-up on a prn basis unless there are any problems.  2. May shower.   3. May return to normal activity without restrictions.                      This document has been electronically signed by LILA Roberson on April 6, 2021 10:46 CDT

## 2021-06-15 ENCOUNTER — OFFICE VISIT (OUTPATIENT)
Dept: CARDIOLOGY | Facility: CLINIC | Age: 71
End: 2021-06-15

## 2021-06-15 ENCOUNTER — TELEPHONE (OUTPATIENT)
Dept: CARDIOLOGY | Facility: CLINIC | Age: 71
End: 2021-06-15

## 2021-06-15 ENCOUNTER — LAB (OUTPATIENT)
Dept: LAB | Facility: HOSPITAL | Age: 71
End: 2021-06-15

## 2021-06-15 VITALS
OXYGEN SATURATION: 98 % | HEIGHT: 64 IN | DIASTOLIC BLOOD PRESSURE: 78 MMHG | BODY MASS INDEX: 24.92 KG/M2 | HEART RATE: 72 BPM | WEIGHT: 146 LBS | SYSTOLIC BLOOD PRESSURE: 150 MMHG | RESPIRATION RATE: 18 BRPM

## 2021-06-15 DIAGNOSIS — I10 ESSENTIAL HYPERTENSION: ICD-10-CM

## 2021-06-15 DIAGNOSIS — R07.2 PRECORDIAL CHEST PAIN: ICD-10-CM

## 2021-06-15 DIAGNOSIS — I34.0 NON-RHEUMATIC MITRAL REGURGITATION: ICD-10-CM

## 2021-06-15 DIAGNOSIS — E78.2 MIXED HYPERLIPIDEMIA: ICD-10-CM

## 2021-06-15 DIAGNOSIS — I10 ESSENTIAL HYPERTENSION: Primary | ICD-10-CM

## 2021-06-15 DIAGNOSIS — I25.10 CORONARY ARTERY DISEASE INVOLVING NATIVE CORONARY ARTERY OF NATIVE HEART WITHOUT ANGINA PECTORIS: ICD-10-CM

## 2021-06-15 LAB
ALBUMIN SERPL-MCNC: 4.4 G/DL (ref 3.5–5.2)
ALBUMIN/GLOB SERPL: 1.4 G/DL
ALP SERPL-CCNC: 83 U/L (ref 39–117)
ALT SERPL W P-5'-P-CCNC: 8 U/L (ref 1–33)
ANION GAP SERPL CALCULATED.3IONS-SCNC: 10 MMOL/L (ref 5–15)
AST SERPL-CCNC: 20 U/L (ref 1–32)
BILIRUB SERPL-MCNC: 0.3 MG/DL (ref 0–1.2)
BUN SERPL-MCNC: 8 MG/DL (ref 8–23)
BUN/CREAT SERPL: 8.2 (ref 7–25)
CALCIUM SPEC-SCNC: 9.6 MG/DL (ref 8.6–10.5)
CHLORIDE SERPL-SCNC: 111 MMOL/L (ref 98–107)
CHOLEST SERPL-MCNC: 144 MG/DL (ref 0–200)
CO2 SERPL-SCNC: 27 MMOL/L (ref 22–29)
CREAT SERPL-MCNC: 0.97 MG/DL (ref 0.57–1)
GFR SERPL CREATININE-BSD FRML MDRD: 56 ML/MIN/1.73
GLOBULIN UR ELPH-MCNC: 3.2 GM/DL
GLUCOSE SERPL-MCNC: 70 MG/DL (ref 65–99)
HDLC SERPL-MCNC: 55 MG/DL (ref 40–60)
LDLC SERPL CALC-MCNC: 70 MG/DL (ref 0–100)
LDLC/HDLC SERPL: 1.24 {RATIO}
POTASSIUM SERPL-SCNC: 4.3 MMOL/L (ref 3.5–5.2)
PROT SERPL-MCNC: 7.6 G/DL (ref 6–8.5)
SODIUM SERPL-SCNC: 148 MMOL/L (ref 136–145)
TRIGL SERPL-MCNC: 104 MG/DL (ref 0–150)
VLDLC SERPL-MCNC: 19 MG/DL (ref 5–40)

## 2021-06-15 PROCEDURE — 99214 OFFICE O/P EST MOD 30 MIN: CPT | Performed by: INTERNAL MEDICINE

## 2021-06-15 PROCEDURE — 80053 COMPREHEN METABOLIC PANEL: CPT

## 2021-06-15 PROCEDURE — 36415 COLL VENOUS BLD VENIPUNCTURE: CPT | Performed by: INTERNAL MEDICINE

## 2021-06-15 PROCEDURE — 80061 LIPID PANEL: CPT | Performed by: INTERNAL MEDICINE

## 2021-06-15 NOTE — PROGRESS NOTES
Fouzia Maldonado  71 y.o. female       6/15/2021     1. Essential hypertension    2. Mixed hyperlipidemia    3. Coronary artery disease involving native coronary artery of native heart without angina pectoris    4. Non-rheumatic mitral regurgitation        History of Present Illness:    .Patient's Body mass index is 25.06 kg/m². BMI is within normal parameters. No follow-up required..    71 years old patient presented almost after year and a half for evaluation of chest pain described as pressure-like feeling off-and-on for more than few month 2-3 on a scale of 10.  She is a pain-free at the time of evaluation EKG normal sinus rhythm no acute ST-T wave changes.  The patient with background history of fluctuating blood pressure, history of nonobstructive CAD on cardiac catheterization several years ago, history of GI bleed with AV malformation and previous normal stress test in 2017.  The patient was on Ranexa previously responded very well but lately having increasing chest discomfort.  No syncope or near syncopal episode reported.  She had a history of retinal detachment and is scheduled to have eye evaluations.  No orthopnea PND or syncopal episode reported.  No intermittent claudication dysuria or hematuria reported.  Previously have normal stress test with mild to moderate dilated left atrium.  No recent lipid profile    ECHO 1/2017  · Mild-to-moderate mitral valve regurgitation is present  · Left ventricular wall thickness is consistent with mild concentric hypertrophy.  · Mild pulmonic valve regurgitation is present.  · Estimated EF = 66%.  · Left atrial cavity size is mild-to-moderately dilated.        2017  · Myocardial perfusion imaging indicates a normal myocardial perfusion study with no evidence of ischemia.  · Impressions are consistent with a low risk study.  · Left ventricular ejection fraction is normal (Calculated EF = 61%  SUBJECTIVE:    Allergies   Allergen Reactions   • Amoxicillin Rash   •  Darvon [Propoxyphene] Rash         Past Medical History:   Diagnosis Date   • Anemia    • Arthritis    • Coronary atherosclerosis of native coronary artery    • Depression    • GERD (gastroesophageal reflux disease)    • Hearing loss of both ears     can hear nothing out of left ear and has right ear loss as well   • Hyperlipidemia    • Hypertension    • Meniere disease    • Nonrheumatic mitral valve disorder    • Pancreatitis    • PONV (postoperative nausea and vomiting)    • Precordial pain    • Preprocedural cardiovascular examination    • Stomach ulcer    • Suicide attempt by drug ingestion (CMS/HCC) 1998   • Transfusion history          Past Surgical History:   Procedure Laterality Date   • CARDIAC CATHETERIZATION     • CHOLECYSTECTOMY WITH INTRAOPERATIVE CHOLANGIOGRAM N/A 3/30/2021    Procedure: LAPAROSCOPIC  CHOLECYSTECTOMY WITH INTRAOPERATIVE CHOLANGIOGRAM;  Surgeon: John Stratton MD;  Location: [facility] OR;  Service: General;  Laterality: N/A;   • COLONOSCOPY     • ECHO - CONVERTED  03/02/2014    Normal LV systolic function with EF of 60%. Early diastolic dysfunction. Left atrium moderately dilated. Normal RV size and function. Trace mitral and mild tricuspid regurg. No intracardiac mass, pericardial effusion or cardiac thrombus seen   • ENDOSCOPY N/A 3/27/2017    Procedure: ESOPHAGOGASTRODUODENOSCOPY WITH CONTROL OF BLEED;  Surgeon: Eladio Esquivel DO;  Location: [facility] ENDOSCOPY;  Service:    • ENDOSCOPY N/A 7/24/2018    Procedure: ESOPHAGOGASTRODUODENOSCOPY;  Surgeon: Eladio Esquivel DO;  Location: [facility] ENDOSCOPY;  Service: Gastroenterology   • ENDOSCOPY N/A 3/18/2021    Procedure: ESOPHAGOGASTRODUODENOSCOPY;  Surgeon: Eladio Esquivel DO;  Location: [facility] ENDOSCOPY;  Service: Gastroenterology;  Laterality: N/A;   • HYSTERECTOMY     • JOINT REPLACEMENT     • TOTAL KNEE ARTHROPLASTY Bilateral    • TUBAL ABDOMINAL LIGATION  1973   • TUMOR REMOVAL Left 1970    fatty tumor removed from left breast   •  TUMOR REMOVAL Right 1971    fatty tumor removal from the right shoulder         Family History   Problem Relation Age of Onset   • Heart disease Mother    • Cancer Mother         colon   • Early death Mother 68        colon cancer   • Hypertension Mother    • Hyperlipidemia Mother    • Heart disease Father    • Alcohol abuse Father    • Early death Father 56        heart attack   • Hypertension Father    • Hyperlipidemia Father    • Cancer Maternal Grandmother         stomach   • Cancer Maternal Grandfather    • Cancer Paternal Grandfather         stomach         Social History     Socioeconomic History   • Marital status:      Spouse name: Not on file   • Number of children: Not on file   • Years of education: Not on file   • Highest education level: Not on file   Tobacco Use   • Smoking status: Former Smoker     Quit date: 2006     Years since quitting: 15.4   • Smokeless tobacco: Never Used   Vaping Use   • Vaping Use: Never used   Substance and Sexual Activity   • Alcohol use: No   • Drug use: No   • Sexual activity: Defer         Current Outpatient Medications   Medication Sig Dispense Refill   • amLODIPine (NORVASC) 10 MG tablet Take 1 tablet by mouth Daily. 30 tablet 11   • ascorbic acid (VITAMIN C) 1000 MG tablet Take 1 capsule by mouth Daily.     • aspirin 81 MG chewable tablet Chew 81 mg Daily.     • carvedilol (COREG) 3.125 MG tablet Take 1 tablet by mouth 2 (Two) Times a Day. 180 tablet 3   • Coenzyme Q10 200 MG capsule Take 1 tablet by mouth Daily.     • enalapril (VASOTEC) 10 MG tablet Take 1 tablet by mouth Daily As Needed (for SBP >160).     • enalapril (VASOTEC) 5 MG tablet Take 5 mg by mouth Daily.     • FLUoxetine (PROzac) 20 MG capsule Take 20 mg by mouth Daily.     • gabapentin (NEURONTIN) 800 MG tablet Take 800 mg by mouth 2 (Two) Times a Day. Patient takes .5 tab in the morning and 1 tablet at night     • gemfibrozil (Lopid) 600 MG tablet Take 600 mg by mouth 2 (Two) Times a Day Before  Meals.     • hydrochlorothiazide (MICROZIDE) 12.5 MG capsule Take 12.5 mg by mouth Daily.     • HYDROcodone-acetaminophen (NORCO) 5-325 MG per tablet Take 1 tablet by mouth Every 4 (Four) Hours As Needed for Pain. 18 tablet 0   • Magnesium Hydroxide (MAGNESIA PO) Take 250 mg by mouth Daily.     • meloxicam (MOBIC) 15 MG tablet Take 15 mg by mouth Daily.     • Omega-3 Fatty Acids (fish oil) 1000 MG capsule capsule Take 1,000 mg by mouth Daily.     • omeprazole (priLOSEC) 40 MG capsule As Needed.     • ondansetron (ZOFRAN) 4 MG tablet Take 4 mg by mouth As Needed.     • pantoprazole (PROTONIX) 40 MG EC tablet Take 40 mg by mouth 2 (Two) Times a Day.     • Potassium 99 MG tablet Take 1 tablet by mouth Every Night.     • pravastatin (PRAVACHOL) 40 MG tablet Take 40 mg by mouth Daily.     • Probiotic Product (PROBIOTIC DAILY PO) Daily.     • sucralfate (CARAFATE) 1 g tablet Take 1 g by mouth 4 (Four) Times a Day.     • temazepam (RESTORIL) 30 MG capsule Take 30 mg by mouth Every Night.     • vitamin B-12 (CYANOCOBALAMIN) 1000 MCG tablet Take 1,000 mcg by mouth Daily.     • vitamin D (ERGOCALCIFEROL) 03961 UNITS capsule capsule Take 50,000 Units by mouth 1 (One) Time Per Week.       No current facility-administered medications for this visit.           Review of Systems:     Constitutional:  Denies recent weight loss, weight gain,, no change in exercise tolerance.     HENT:  Denies any hearing loss, epistaxis, hoarseness,   Eyes: Wears eyeglasses or contact lenses.    Respiratory:  Denies dyspnea with exertion,no cough    Cardiovascular: See H&P    Gastrointestinal: History of AV malformation    Endocrine: Negative for cold intolerance, heat intolerance, polydipsia, polyphagia and polyuria. .     Genitourinary: Negative.      Musculoskeletal: Osteoarthritis    Skin:  Denies any change in hair or nails, rashes, or skin lesions.     Allergic/Immunologic: Negative.  Negative for environmental allergies,     Neurological:   "Denies any history of recurrent headaches, strokes, TIA, or seizure disorder.     Hematological: Denies any food allergies, seasonal allergies, bleeding disorders, or lymphadenopathy.     Psychiatric/Behavioral: Denies any history of depression,      OBJECTIVE:    /78 (BP Location: Left arm, Patient Position: Sitting, Cuff Size: Adult)   Pulse 72   Resp 18   Ht 162.6 cm (64\")   Wt 66.2 kg (146 lb)   SpO2 98%   BMI 25.06 kg/m²       Physical Exam:     Constitutional: Cooperative, alert and oriented, well-developed, well-nourished, in no acute distress.     HENT:   Head: Normocephalic, conjunctive is pink thyroid is nonpalpable no jugular is distention    Cardiovascular: Regular rhythm, S1 and S2 normal, no S3 or S4. Apical impulse not displaced. No murmurs, gallops, or rubs detected.     Pulmonary/Chest: Chest: Clear to auscultation no rales no wheezing    Abdominal: Abdomen soft, bowel sounds normoactive, no masses    Musculoskeletal: No deformities, straight peripheral pulses no edema.     Neurological: No gross motor or sensory deficits noted, affect appropriate, oriented to time, person, place.     Skin: Warm and dry to the touch, no apparent skin lesions or masses noted.     Psychiatric: She has a normal mood and affect. Her behavior is normal.        Procedures      Lab Results   Component Value Date    WBC 7.96 03/16/2020    HGB 13.9 03/16/2020    HCT 41.7 03/16/2020    MCV 86.9 03/16/2020     03/16/2020     Lab Results   Component Value Date    GLUCOSE 67 03/25/2021    BUN 8 03/25/2021    CREATININE 0.92 03/25/2021    EGFRIFNONA 60 (L) 03/25/2021    BCR 8.7 03/25/2021    CO2 26.0 03/25/2021    CALCIUM 9.6 03/25/2021    ALBUMIN 4.20 03/25/2021    AST 16 03/25/2021    ALT 12 03/25/2021     No results found for: CHOL  No results found for: TRIG  No results found for: HDL  No components found for: LDLCALC  No results found for: LDL  No results found for: HDLLDLRATIO  No components found for: " CHOLHDL  No results found for: HGBA1C  Lab Results   Component Value Date    TSH 2.63 03/03/2014           ASSESSMENT AND PLAN:    #1 precordial chest pain with history of nonobstructive CAD    71 years old patient with history of nonobstructive CAD and previous normal stress test who presented for evaluation of chest pain off and on she does have history previously but responded well to Ranexa.  She is on antiplatelet agent and has a fluctuating blood pressure.  Given the previous history of nonobstructive CAD, chest discomfort and multiple risk factors and seemed appropriate to risk stratify with a CT coronary angiogram.  We will arrange an echocardiogram to reassess the biventricular functions.  Procedure risk regarding CT cholangiogram discussed with the patient.  Understand willing to proceed forward.  Continue antiplatelet, continue Ranexa    #2 hypertension with hypertensive heart disease    Fluctuating blood pressure today is 150/78.  She is on amlodipine 10 mg, carvedilol 3.125 mg and take Vasotec 5 mg daily.  Of asked the patient increase the dose to 10 mg and importance of water intake also discussed.  Patient is on hydrochlorothiazide.    Hyperlipidemia on gemfibrozil no recent blood work will arrange to see if any statin need to be added dose need to be adjusted      #1 history of nonobstructive coronary to disease within normal recent stress test #2 hypertension with hypertensive heart disease #3 mild to moderate mitral regurgitation.  #4 dyspnea on exertion may be multifactorial needed in etiology.  #5 history of for dyspepsia/gastritis with previous history of pancreatitis complicated with GI bleed      Diagnoses and all orders for this visit:    1. Essential hypertension (Primary)    2. Mixed hyperlipidemia    3. Coronary artery disease involving native coronary artery of native heart without angina pectoris    4. Non-rheumatic mitral regurgitation        Robbin Matthews MD  6/15/2021  09:25 CDT

## 2021-06-15 NOTE — TELEPHONE ENCOUNTER
Called patient to let her know that   PER DR. LAI.   Her lipid is normal   But her sodium, was a little high,  would like her to drink more water.     She was understanding

## 2021-06-22 ENCOUNTER — HOSPITAL ENCOUNTER (OUTPATIENT)
Dept: CT IMAGING | Facility: HOSPITAL | Age: 71
Discharge: HOME | End: 2021-06-22
Admitting: INTERNAL MEDICINE

## 2021-06-22 DIAGNOSIS — I25.10 CORONARY ARTERY DISEASE INVOLVING NATIVE CORONARY ARTERY OF NATIVE HEART WITHOUT ANGINA PECTORIS: ICD-10-CM

## 2021-06-22 DIAGNOSIS — R07.2 PRECORDIAL CHEST PAIN: ICD-10-CM

## 2021-06-22 PROCEDURE — 0 IOPAMIDOL PER 1 ML: Performed by: INTERNAL MEDICINE

## 2021-06-22 PROCEDURE — 75574 CT ANGIO HRT W/3D IMAGE: CPT

## 2021-06-22 RX ADMIN — IOPAMIDOL 90 ML: 755 INJECTION, SOLUTION INTRAVENOUS at 10:45

## 2021-06-23 DIAGNOSIS — K44.9 HIATAL HERNIA: Primary | ICD-10-CM

## 2021-06-23 DIAGNOSIS — R93.89 ABNORMAL COMPUTED TOMOGRAPHY ANGIOGRAPHY (CTA): ICD-10-CM

## 2021-06-23 RX ORDER — ISOSORBIDE MONONITRATE 30 MG/1
30 TABLET, EXTENDED RELEASE ORAL DAILY
Qty: 90 TABLET | Refills: 3 | Status: SHIPPED | OUTPATIENT
Start: 2021-06-23 | End: 2022-09-13 | Stop reason: SDUPTHER

## 2021-06-23 NOTE — TELEPHONE ENCOUNTER
Coronary CTA results and Recommendations per Dr Patricio as followed    Non-obstructive CAD  Hiatal Hernia    Medical Management of CAD  Add Imdur 30mg daily to regimen    Refer to General Surgery for Hernia  (DR. Stratton as requested by Dr Patricio)    Patient was notified and the above was explained. Patient verbalized understanding.  Patient is agreeable to recommendations. RX for isosorbide sent, pending signature. Order for referral to Dr Stratton placed.

## 2021-07-16 ENCOUNTER — OFFICE VISIT (OUTPATIENT)
Dept: SURGERY | Facility: CLINIC | Age: 71
End: 2021-07-16

## 2021-07-16 VITALS
TEMPERATURE: 97.4 F | HEIGHT: 64 IN | SYSTOLIC BLOOD PRESSURE: 126 MMHG | BODY MASS INDEX: 25.33 KG/M2 | WEIGHT: 148.4 LBS | HEART RATE: 64 BPM | DIASTOLIC BLOOD PRESSURE: 74 MMHG

## 2021-07-16 DIAGNOSIS — K44.9 HIATAL HERNIA: Primary | ICD-10-CM

## 2021-07-16 PROCEDURE — 99213 OFFICE O/P EST LOW 20 MIN: CPT | Performed by: SURGERY

## 2021-07-16 NOTE — PATIENT INSTRUCTIONS
"BMI for Adults  What is BMI?  Body mass index (BMI) is a number that is calculated from a person's weight and height. BMI can help estimate how much of a person's weight is composed of fat. BMI does not measure body fat directly. Rather, it is an alternative to procedures that directly measure body fat, which can be difficult and expensive.  BMI can help identify people who may be at higher risk for certain medical problems.  What are BMI measurements used for?  BMI is used as a screening tool to identify possible weight problems. It helps determine whether a person is obese, overweight, a healthy weight, or underweight.  BMI is useful for:  · Identifying a weight problem that may be related to a medical condition or may increase the risk for medical problems.  · Promoting changes, such as changes in diet and exercise, to help reach a healthy weight. BMI screening can be repeated to see if these changes are working.  How is BMI calculated?  BMI involves measuring your weight in relation to your height. Both height and weight are measured, and the BMI is calculated from those numbers. This can be done either in English ([place] or metric measurements. Note that charts and online BMI calculators are available to help you find your BMI quickly and easily without having to do these calculations yourself.  To calculate your BMI in English ([place] measurements:    1. Measure your weight in pounds (lb).  2. Multiply the number of pounds by 703.  ? For example, for a person who weighs 180 lb, multiply that number by 703, which equals 126,540.  3. Measure your height in inches. Then multiply that number by itself to get a measurement called \"inches squared.\"  ? For example, for a person who is 70 inches tall, the \"inches squared\" measurement is 70 inches x 70 inches, which equals 4,900 inches squared.  4. Divide the total from step 2 (number of lb x 703) by the total from step 3 (inches squared): 126,540 ÷ 4,900 = 25.8. This is " "your BMI.  To calculate your BMI in metric measurements:  1. Measure your weight in kilograms (kg).  2. Measure your height in meters (m). Then multiply that number by itself to get a measurement called \"meters squared.\"  ? For example, for a person who is 1.75 m tall, the \"meters squared\" measurement is 1.75 m x 1.75 m, which is equal to 3.1 meters squared.  3. Divide the number of kilograms (your weight) by the meters squared number. In this example: 70 ÷ 3.1 = 22.6. This is your BMI.  What do the results mean?  BMI charts are used to identify whether you are underweight, normal weight, overweight, or obese. The following guidelines will be used:  · Underweight: BMI less than 18.5.  · Normal weight: BMI between 18.5 and 24.9.  · Overweight: BMI between 25 and 29.9.  · Obese: BMI of 30 or above.  Keep these notes in mind:  · Weight includes both fat and muscle, so someone with a muscular build, such as an athlete, may have a BMI that is higher than 24.9. In cases like these, BMI is not an accurate measure of body fat.  · To determine if excess body fat is the cause of a BMI of 25 or higher, further assessments may need to be done by a health care provider.  · BMI is usually interpreted in the same way for men and women.  Where to find more information  For more information about BMI, including tools to quickly calculate your BMI, go to these websites:  · Centers for Disease Control and Prevention: www.cdc.gov  · American Heart Association: www.heart.org  · National Heart, Lung, and Blood [employer]: www.nhlbi.nih.gov  Summary  · Body mass index (BMI) is a number that is calculated from a person's weight and height.  · BMI may help estimate how much of a person's weight is composed of fat. BMI can help identify those who may be at higher risk for certain medical problems.  · BMI can be measured using English measurements or metric measurements.  · BMI charts are used to identify whether you are underweight, normal " weight, overweight, or obese.  This information is not intended to replace advice given to you by your health care provider. Make sure you discuss any questions you have with your health care provider.  Document Revised: 09/09/2020 Document Reviewed: 07/17/2020  [employer]vier Patient Education © 2021 [employer]

## 2021-07-16 NOTE — PROGRESS NOTES
Chief Complaint   Patient presents with   • Follow-up     Recheck Hiatal Hernia        HPI  71-year-old woman with a hiatal hernia documented by chest CT.  She states she is having minimal symptomatology.  She has no dysphagia but she does have early satiety and is only able to eat small amounts before coming full.  She is only having mild midepigastric pain.  Not desire consideration for operative manage  Past Medical History:   Diagnosis Date   • Anemia    • Arthritis    • Coronary atherosclerosis of native coronary artery    • Depression    • GERD (gastroesophageal reflux disease)    • Hearing loss of both ears     can hear nothing out of left ear and has right ear loss as well   • Hyperlipidemia    • Hypertension    • Meniere disease    • Nonrheumatic mitral valve disorder    • Pancreatitis    • PONV (postoperative nausea and vomiting)    • Precordial pain    • Preprocedural cardiovascular examination    • Stomach ulcer    • Suicide attempt by drug ingestion (CMS/[facility]) 1998   • Transfusion history        Past Surgical History:   Procedure Laterality Date   • CARDIAC CATHETERIZATION     • CHOLECYSTECTOMY WITH INTRAOPERATIVE CHOLANGIOGRAM N/A 3/30/2021    Procedure: LAPAROSCOPIC  CHOLECYSTECTOMY WITH INTRAOPERATIVE CHOLANGIOGRAM;  Surgeon: John Stratton MD;  Location: [facility] OR;  Service: General;  Laterality: N/A;   • COLONOSCOPY     • ECHO - CONVERTED  03/02/2014    Normal LV systolic function with EF of 60%. Early diastolic dysfunction. Left atrium moderately dilated. Normal RV size and function. Trace mitral and mild tricuspid regurg. No intracardiac mass, pericardial effusion or cardiac thrombus seen   • ENDOSCOPY N/A 3/27/2017    Procedure: ESOPHAGOGASTRODUODENOSCOPY WITH CONTROL OF BLEED;  Surgeon: Eladio Esquivel DO;  Location: [facility] ENDOSCOPY;  Service:    • ENDOSCOPY N/A 7/24/2018    Procedure: ESOPHAGOGASTRODUODENOSCOPY;  Surgeon: Eladio Esquivel DO;  Location: [facility] ENDOSCOPY;  Service:  Gastroenterology   • ENDOSCOPY N/A 3/18/2021    Procedure: ESOPHAGOGASTRODUODENOSCOPY;  Surgeon: Eladio Esquivel DO;  Location: [facility] ENDOSCOPY;  Service: Gastroenterology;  Laterality: N/A;   • HYSTERECTOMY     • JOINT REPLACEMENT     • TOTAL KNEE ARTHROPLASTY Bilateral    • TUBAL ABDOMINAL LIGATION  1973   • TUMOR REMOVAL Left 1970    fatty tumor removed from left breast   • TUMOR REMOVAL Right 1971    fatty tumor removal from the right shoulder         Current Outpatient Medications:   •  amLODIPine (NORVASC) 10 MG tablet, Take 1 tablet by mouth Daily., Disp: 30 tablet, Rfl: 11  •  ascorbic acid (VITAMIN C) 1000 MG tablet, Take 1 capsule by mouth Daily., Disp: , Rfl:   •  aspirin 81 MG chewable tablet, Chew 81 mg Daily., Disp: , Rfl:   •  carvedilol (COREG) 3.125 MG tablet, Take 1 tablet by mouth 2 (Two) Times a Day., Disp: 180 tablet, Rfl: 3  •  Coenzyme Q10 200 MG capsule, Take 1 tablet by mouth Daily., Disp: , Rfl:   •  enalapril (VASOTEC) 10 MG tablet, Take 1 tablet by mouth Daily As Needed (for SBP >160)., Disp: , Rfl:   •  FLUoxetine (PROzac) 20 MG capsule, Take 20 mg by mouth Daily., Disp: , Rfl:   •  gabapentin (NEURONTIN) 800 MG tablet, Take 800 mg by mouth 2 (Two) Times a Day. Patient takes .5 tab in the morning and 1 tablet at night, Disp: , Rfl:   •  gemfibrozil (Lopid) 600 MG tablet, Take 600 mg by mouth 2 (Two) Times a Day Before Meals., Disp: , Rfl:   •  HYDROcodone-acetaminophen (NORCO) 5-325 MG per tablet, Take 1 tablet by mouth Every 4 (Four) Hours As Needed for Pain., Disp: 18 tablet, Rfl: 0  •  isosorbide mononitrate (IMDUR) 30 MG 24 hr tablet, Take 1 tablet by mouth Daily., Disp: 90 tablet, Rfl: 3  •  Magnesium Hydroxide (MAGNESIA PO), Take 250 mg by mouth Daily., Disp: , Rfl:   •  meloxicam (MOBIC) 15 MG tablet, Take 15 mg by mouth Daily., Disp: , Rfl:   •  metoprolol tartrate (LOPRESSOR) 25 MG tablet, Take one tablet the night before CTA.  Take one tablet the morning of CTA., Disp: 2  tablet, Rfl: 0  •  Omega-3 Fatty Acids (fish oil) 1000 MG capsule capsule, Take 1,000 mg by mouth Daily., Disp: , Rfl:   •  omeprazole (priLOSEC) 40 MG capsule, As Needed., Disp: , Rfl:   •  ondansetron (ZOFRAN) 4 MG tablet, Take 4 mg by mouth As Needed., Disp: , Rfl:   •  pantoprazole (PROTONIX) 40 MG EC tablet, Take 40 mg by mouth 2 (Two) Times a Day., Disp: , Rfl:   •  Potassium 99 MG tablet, Take 1 tablet by mouth Every Night., Disp: , Rfl:   •  pravastatin (PRAVACHOL) 40 MG tablet, Take 40 mg by mouth Daily., Disp: , Rfl:   •  Probiotic Product (PROBIOTIC DAILY PO), Daily., Disp: , Rfl:   •  sucralfate (CARAFATE) 1 g tablet, Take 1 g by mouth 4 (Four) Times a Day., Disp: , Rfl:   •  temazepam (RESTORIL) 30 MG capsule, Take 30 mg by mouth Every Night., Disp: , Rfl:   •  vitamin B-12 (CYANOCOBALAMIN) 1000 MCG tablet, Take 1,000 mcg by mouth Daily., Disp: , Rfl:   •  vitamin D (ERGOCALCIFEROL) 95784 UNITS capsule capsule, Take 50,000 Units by mouth 1 (One) Time Per Week., Disp: , Rfl:   •  hydrochlorothiazide (MICROZIDE) 12.5 MG capsule, Take 12.5 mg by mouth Daily., Disp: , Rfl:     Allergies   Allergen Reactions   • Amoxicillin Rash   • Darvon [Propoxyphene] Rash       Family History   Problem Relation Age of Onset   • Heart disease Mother    • Cancer Mother         colon   • Early death Mother 68        colon cancer   • Hypertension Mother    • Hyperlipidemia Mother    • Heart disease Father    • Alcohol abuse Father    • Early death Father 56        heart attack   • Hypertension Father    • Hyperlipidemia Father    • Cancer Maternal Grandmother         stomach   • Cancer Maternal Grandfather    • Cancer Paternal Grandfather         stomach       Social History     Socioeconomic History   • Marital status:      Spouse name: Not on file   • Number of children: Not on file   • Years of education: Not on file   • Highest education level: Not on file   Tobacco Use   • Smoking status: Former Smoker     Quit  date: 2006     Years since quitting: 15.5   • Smokeless tobacco: Never Used   Vaping Use   • Vaping Use: Never used   Substance and Sexual Activity   • Alcohol use: No   • Drug use: No   • Sexual activity: Defer       Review of Systems   Constitutional: Negative for appetite change, chills, fever and unexpected weight change.   HENT: Negative for hearing loss, nosebleeds and trouble swallowing.    Eyes: Negative for visual disturbance.   Respiratory: Negative for apnea, cough, choking, chest tightness, shortness of breath, wheezing and stridor.    Cardiovascular: Negative for chest pain, palpitations and leg swelling.   Gastrointestinal: Positive for abdominal pain. Negative for abdominal distention, blood in stool, constipation, diarrhea, nausea and vomiting.   Endocrine: Negative for cold intolerance, heat intolerance, polydipsia, polyphagia and polyuria.   Genitourinary: Negative for difficulty urinating, dysuria, frequency, hematuria and urgency.   Musculoskeletal: Negative for arthralgias, back pain, myalgias and neck pain.   Skin: Negative for color change, pallor and rash.   Allergic/Immunologic: Negative for immunocompromised state.   Neurological: Negative for dizziness, seizures, syncope, light-headedness, numbness and headaches.   Hematological: Negative for adenopathy.   Psychiatric/Behavioral: Negative for suicidal ideas. The patient is not nervous/anxious.        Physical Exam  Vitals reviewed.   Constitutional:       Appearance: Normal appearance.   HENT:      Head: Normocephalic and atraumatic.      Nose: Nose normal.   Eyes:      Extraocular Movements: Extraocular movements intact.      Pupils: Pupils are equal, round, and reactive to light.   Cardiovascular:      Rate and Rhythm: Normal rate and regular rhythm.   Pulmonary:      Effort: Pulmonary effort is normal. No respiratory distress.   Abdominal:      General: Abdomen is flat. Bowel sounds are normal. There is no distension.      Palpations:  Abdomen is soft. There is no mass.      Tenderness: There is no abdominal tenderness. There is no guarding or rebound.      Hernia: No hernia is present.   Musculoskeletal:         General: Normal range of motion.      Cervical back: Normal range of motion and neck supple.   Skin:     General: Skin is warm and dry.   Neurological:      General: No focal deficit present.      Mental Status: She is alert and oriented to person, place, and time.   Psychiatric:         Mood and Affect: Mood normal.         Behavior: Behavior normal.           ASSESSMENT    Diagnoses and all orders for this visit:    1. Hiatal hernia (Primary)        PLAN    1.  Patient desires observation at this time  2.  Recommend follow-up in 4 months or sooner should her symptoms worsen              This document has been electronically signed by John Stratton MD on July 16, 2021 15:11 CDT

## 2021-09-29 ENCOUNTER — TELEPHONE (OUTPATIENT)
Dept: CARDIOLOGY | Facility: CLINIC | Age: 71
End: 2021-09-29

## 2021-09-29 RX ORDER — AMLODIPINE BESYLATE 10 MG/1
10 TABLET ORAL DAILY
Qty: 30 TABLET | Refills: 11 | Status: SHIPPED | OUTPATIENT
Start: 2021-09-29 | End: 2021-10-12 | Stop reason: SDUPTHER

## 2021-09-29 NOTE — TELEPHONE ENCOUNTER
----- Message from Edilma Jensen sent at 2021 11:51 AM CDT -----  Regarding: refill  Fouzia harman 46  [phone] wanted a refill on her Amlodlpine 10 mg @ [place] [facility]

## 2021-09-29 NOTE — TELEPHONE ENCOUNTER
Returned patient call to let patient know her refill was sent to [facility] in [place] as requested. She voiced her understanding.

## 2021-10-12 RX ORDER — AMLODIPINE BESYLATE 10 MG/1
10 TABLET ORAL DAILY
Qty: 90 TABLET | Refills: 3 | Status: SHIPPED | OUTPATIENT
Start: 2021-10-12 | End: 2022-03-04

## 2021-11-17 ENCOUNTER — TELEPHONE (OUTPATIENT)
Dept: CARDIOLOGY | Facility: CLINIC | Age: 71
End: 2021-11-17

## 2021-11-17 ENCOUNTER — OFFICE VISIT (OUTPATIENT)
Dept: SURGERY | Facility: CLINIC | Age: 71
End: 2021-11-17

## 2021-11-17 VITALS
OXYGEN SATURATION: 98 % | HEART RATE: 68 BPM | BODY MASS INDEX: 24.89 KG/M2 | DIASTOLIC BLOOD PRESSURE: 82 MMHG | SYSTOLIC BLOOD PRESSURE: 150 MMHG | HEIGHT: 64 IN | WEIGHT: 145.8 LBS

## 2021-11-17 DIAGNOSIS — K44.9 HIATAL HERNIA: Primary | ICD-10-CM

## 2021-11-17 PROCEDURE — 99213 OFFICE O/P EST LOW 20 MIN: CPT | Performed by: SURGERY

## 2021-11-17 RX ORDER — CHLORTHALIDONE 25 MG/1
TABLET ORAL
COMMUNITY
Start: 2021-09-27 | End: 2022-03-04

## 2021-11-17 RX ORDER — FLUTICASONE PROPIONATE 50 MCG
SPRAY, SUSPENSION (ML) NASAL
COMMUNITY
Start: 2021-08-17

## 2021-11-17 RX ORDER — MECLIZINE HYDROCHLORIDE 25 MG/1
TABLET ORAL
COMMUNITY
Start: 2021-09-29

## 2021-11-17 NOTE — PATIENT INSTRUCTIONS
"BMI for Adults  What is BMI?  Body mass index (BMI) is a number that is calculated from a person's weight and height. BMI can help estimate how much of a person's weight is composed of fat. BMI does not measure body fat directly. Rather, it is an alternative to procedures that directly measure body fat, which can be difficult and expensive.  BMI can help identify people who may be at higher risk for certain medical problems.  What are BMI measurements used for?  BMI is used as a screening tool to identify possible weight problems. It helps determine whether a person is obese, overweight, a healthy weight, or underweight.  BMI is useful for:  · Identifying a weight problem that may be related to a medical condition or may increase the risk for medical problems.  · Promoting changes, such as changes in diet and exercise, to help reach a healthy weight. BMI screening can be repeated to see if these changes are working.  How is BMI calculated?  BMI involves measuring your weight in relation to your height. Both height and weight are measured, and the BMI is calculated from those numbers. This can be done either in English (U[place] or metric measurements. Note that charts and online BMI calculators are available to help you find your BMI quickly and easily without having to do these calculations yourself.  To calculate your BMI in English (U.[place] measurements:    1. Measure your weight in pounds (lb).  2. Multiply the number of pounds by 703.  ? For example, for a person who weighs 180 lb, multiply that number by 703, which equals 126,540.  3. Measure your height in inches. Then multiply that number by itself to get a measurement called \"inches squared.\"  ? For example, for a person who is 70 inches tall, the \"inches squared\" measurement is 70 inches x 70 inches, which equals 4,900 inches squared.  4. Divide the total from step 2 (number of lb x 703) by the total from step 3 (inches squared): 126,540 ÷ 4,900 = 25.8. This is " "your BMI.    To calculate your BMI in metric measurements:  1. Measure your weight in kilograms (kg).  2. Measure your height in meters (m). Then multiply that number by itself to get a measurement called \"meters squared.\"  ? For example, for a person who is 1.75 m tall, the \"meters squared\" measurement is 1.75 m x 1.75 m, which is equal to 3.1 meters squared.  3. Divide the number of kilograms (your weight) by the meters squared number. In this example: 70 ÷ 3.1 = 22.6. This is your BMI.  What do the results mean?  BMI charts are used to identify whether you are underweight, normal weight, overweight, or obese. The following guidelines will be used:  · Underweight: BMI less than 18.5.  · Normal weight: BMI between 18.5 and 24.9.  · Overweight: BMI between 25 and 29.9.  · Obese: BMI of 30 or above.  Keep these notes in mind:  · Weight includes both fat and muscle, so someone with a muscular build, such as an athlete, may have a BMI that is higher than 24.9. In cases like these, BMI is not an accurate measure of body fat.  · To determine if excess body fat is the cause of a BMI of 25 or higher, further assessments may need to be done by a health care provider.  · BMI is usually interpreted in the same way for men and women.  Where to find more information  For more information about BMI, including tools to quickly calculate your BMI, go to these websites:  · Centers for Disease Control and Prevention: www.cdc.gov  · American Heart Association: www.heart.org  · National Heart, Lung, and [employer] [place]: www.nhlbi.nih.gov  Summary  · Body mass index (BMI) is a number that is calculated from a person's weight and height.  · BMI may help estimate how much of a person's weight is composed of fat. BMI can help identify those who may be at higher risk for certain medical problems.  · BMI can be measured using English measurements or metric measurements.  · BMI charts are used to identify whether you are underweight, normal " weight, overweight, or obese.  This information is not intended to replace advice given to you by your health care provider. Make sure you discuss any questions you have with your health care provider.  Document Revised: 09/09/2020 Document Reviewed: 07/17/2020  Elsevier Patient Education © 2021 [employer]

## 2021-11-17 NOTE — TELEPHONE ENCOUNTER
Contacted patient to inform her about her Echo results. Per Dr. Matthews echo showed good heart muscle and a moderate leaky mitral aortic valve, but this is nothing significant and will monitor every 2-3 years with another echo. She voiced her understanding.

## 2021-11-22 NOTE — PROGRESS NOTES
Chief Complaint   Patient presents with   • Follow-up     4 month reval        HPI  71-year-old woman with a hiatal hernia documented by chest CT.  She states she is still having minimal symptomatology.  She has no dysphagia, but continues to  have early satiety and is only able to eat small amounts before coming full.  She is only having mild midepigastric pain.  Still with desire consideration for operative manage  Medical History       Past Medical History:   Diagnosis Date   • Anemia    • Arthritis    • Coronary atherosclerosis of native coronary artery    • Depression    • GERD (gastroesophageal reflux disease)    • Hearing loss of both ears     can hear nothing out of left ear and has right ear loss as well   • Hyperlipidemia    • Hypertension    • Meniere disease    • Nonrheumatic mitral valve disorder    • Pancreatitis    • PONV (postoperative nausea and vomiting)    • Precordial pain    • Preprocedural cardiovascular examination    • Stomach ulcer    • Suicide attempt by drug ingestion (HCC) 1998   • Transfusion history        Past Surgical History:   Procedure Laterality Date   • CARDIAC CATHETERIZATION     • CHOLECYSTECTOMY WITH INTRAOPERATIVE CHOLANGIOGRAM N/A 3/30/2021    Procedure: LAPAROSCOPIC  CHOLECYSTECTOMY WITH INTRAOPERATIVE CHOLANGIOGRAM;  Surgeon: John Stratton MD;  Location: [facility] OR;  Service: General;  Laterality: N/A;   • COLONOSCOPY     • ECHO - CONVERTED  03/02/2014    Normal LV systolic function with EF of 60%. Early diastolic dysfunction. Left atrium moderately dilated. Normal RV size and function. Trace mitral and mild tricuspid regurg. No intracardiac mass, pericardial effusion or cardiac thrombus seen   • ENDOSCOPY N/A 3/27/2017    Procedure: ESOPHAGOGASTRODUODENOSCOPY WITH CONTROL OF BLEED;  Surgeon: Eladio Esquivel DO;  Location: [facility] ENDOSCOPY;  Service:    • ENDOSCOPY N/A 7/24/2018    Procedure: ESOPHAGOGASTRODUODENOSCOPY;  Surgeon: Eladio Esquivel DO;  Location: [facility]  ENDOSCOPY;  Service: Gastroenterology   • ENDOSCOPY N/A 3/18/2021    Procedure: ESOPHAGOGASTRODUODENOSCOPY;  Surgeon: Eladio Esquivel DO;  Location: [facility] ENDOSCOPY;  Service: Gastroenterology;  Laterality: N/A;   • HYSTERECTOMY     • JOINT REPLACEMENT     • TOTAL KNEE ARTHROPLASTY Bilateral    • TUBAL ABDOMINAL LIGATION  1973   • TUMOR REMOVAL Left 1970    fatty tumor removed from left breast   • TUMOR REMOVAL Right 1971    fatty tumor removal from the right shoulder         Current Outpatient Medications:   •  amLODIPine (NORVASC) 10 MG tablet, Take 1 tablet by mouth Daily., Disp: 90 tablet, Rfl: 3  •  ascorbic acid (VITAMIN C) 1000 MG tablet, Take 1 capsule by mouth Daily., Disp: , Rfl:   •  aspirin 81 MG chewable tablet, Chew 81 mg Daily., Disp: , Rfl:   •  carvedilol (COREG) 3.125 MG tablet, Take 1 tablet by mouth 2 (Two) Times a Day., Disp: 180 tablet, Rfl: 3  •  chlorthalidone (HYGROTON) 25 MG tablet, , Disp: , Rfl:   •  Coenzyme Q10 200 MG capsule, Take 1 tablet by mouth Daily., Disp: , Rfl:   •  enalapril (VASOTEC) 10 MG tablet, Take 1 tablet by mouth Daily As Needed (for SBP >160)., Disp: , Rfl:   •  FLUoxetine (PROzac) 20 MG capsule, Take 20 mg by mouth Daily., Disp: , Rfl:   •  fluticasone (FLONASE) 50 MCG/ACT nasal spray, , Disp: , Rfl:   •  gabapentin (NEURONTIN) 800 MG tablet, Take 800 mg by mouth 2 (Two) Times a Day. Patient takes .5 tab in the morning and 1 tablet at night, Disp: , Rfl:   •  gemfibrozil (Lopid) 600 MG tablet, Take 600 mg by mouth 2 (Two) Times a Day Before Meals., Disp: , Rfl:   •  hydrochlorothiazide (MICROZIDE) 12.5 MG capsule, Take 12.5 mg by mouth Daily., Disp: , Rfl:   •  HYDROcodone-acetaminophen (NORCO) 5-325 MG per tablet, Take 1 tablet by mouth Every 4 (Four) Hours As Needed for Pain., Disp: 18 tablet, Rfl: 0  •  isosorbide mononitrate (IMDUR) 30 MG 24 hr tablet, Take 1 tablet by mouth Daily., Disp: 90 tablet, Rfl: 3  •  Magnesium Hydroxide (MAGNESIA PO), Take 250 mg by  mouth Daily., Disp: , Rfl:   •  meloxicam (MOBIC) 15 MG tablet, Take 15 mg by mouth Daily., Disp: , Rfl:   •  metoprolol tartrate (LOPRESSOR) 25 MG tablet, Take one tablet the night before CTA.  Take one tablet the morning of CTA., Disp: 2 tablet, Rfl: 0  •  Omega-3 Fatty Acids (fish oil) 1000 MG capsule capsule, Take 1,000 mg by mouth Daily., Disp: , Rfl:   •  omeprazole (priLOSEC) 40 MG capsule, As Needed., Disp: , Rfl:   •  pantoprazole (PROTONIX) 40 MG EC tablet, Take 40 mg by mouth 2 (Two) Times a Day., Disp: , Rfl:   •  Potassium 99 MG tablet, Take 1 tablet by mouth Every Night., Disp: , Rfl:   •  pravastatin (PRAVACHOL) 40 MG tablet, Take 40 mg by mouth Daily., Disp: , Rfl:   •  Probiotic Product (PROBIOTIC DAILY PO), Daily., Disp: , Rfl:   •  sucralfate (CARAFATE) 1 g tablet, Take 1 g by mouth 4 (Four) Times a Day., Disp: , Rfl:   •  temazepam (RESTORIL) 30 MG capsule, Take 30 mg by mouth Every Night., Disp: , Rfl:   •  vitamin B-12 (CYANOCOBALAMIN) 1000 MCG tablet, Take 1,000 mcg by mouth Daily., Disp: , Rfl:   •  vitamin D (ERGOCALCIFEROL) 96523 UNITS capsule capsule, Take 50,000 Units by mouth 1 (One) Time Per Week., Disp: , Rfl:   •  meclizine (ANTIVERT) 25 MG tablet, , Disp: , Rfl:   •  ondansetron (ZOFRAN) 4 MG tablet, Take 4 mg by mouth As Needed., Disp: , Rfl:     Allergies   Allergen Reactions   • Amoxicillin Rash   • Darvon [Propoxyphene] Rash       Family History   Problem Relation Age of Onset   • Heart disease Mother    • Cancer Mother         colon   • Early death Mother 68        colon cancer   • Hypertension Mother    • Hyperlipidemia Mother    • Heart disease Father    • Alcohol abuse Father    • Early death Father 56        heart attack   • Hypertension Father    • Hyperlipidemia Father    • Cancer Maternal Grandmother         stomach   • Cancer Maternal Grandfather    • Cancer Paternal Grandfather         stomach       Social History     Socioeconomic History   • Marital status:     Tobacco Use   • Smoking status: Former Smoker     Quit date: 2006     Years since quitting: 15.9   • Smokeless tobacco: Never Used   Vaping Use   • Vaping Use: Never used   Substance and Sexual Activity   • Alcohol use: No   • Drug use: No   • Sexual activity: Defer       Review of Systems   Cardiovascular: Positive for chest pain (Mild and intermittent).   Gastrointestinal: Negative for abdominal distention, abdominal pain, anal bleeding, blood in stool, constipation, diarrhea, nausea, rectal pain and vomiting.       Physical Exam  Cardiovascular:      Rate and Rhythm: Normal rate and regular rhythm.   Pulmonary:      Effort: Pulmonary effort is normal. No respiratory distress.   Abdominal:      General: Abdomen is flat. There is no distension.      Palpations: There is no mass.      Tenderness: There is no abdominal tenderness. There is no guarding or rebound.      Hernia: No hernia is present.           ASSESSMENT    Diagnoses and all orders for this visit:    1. Hiatal hernia (Primary)        PLAN    1.She desires no surgery currently  2. Recheck in 4 months            This document has been electronically signed by John Stratton MD on November 23, 2021 06:38 CST

## 2022-03-04 ENCOUNTER — OFFICE VISIT (OUTPATIENT)
Dept: CARDIOLOGY | Facility: CLINIC | Age: 72
End: 2022-03-04

## 2022-03-04 VITALS
DIASTOLIC BLOOD PRESSURE: 68 MMHG | HEIGHT: 64 IN | OXYGEN SATURATION: 98 % | TEMPERATURE: 98.2 F | BODY MASS INDEX: 26.8 KG/M2 | HEART RATE: 62 BPM | SYSTOLIC BLOOD PRESSURE: 118 MMHG | WEIGHT: 157 LBS

## 2022-03-04 DIAGNOSIS — I10 ESSENTIAL HYPERTENSION: Primary | ICD-10-CM

## 2022-03-04 DIAGNOSIS — I25.10 CORONARY ARTERY DISEASE INVOLVING NATIVE CORONARY ARTERY OF NATIVE HEART WITHOUT ANGINA PECTORIS: ICD-10-CM

## 2022-03-04 DIAGNOSIS — E78.2 MIXED HYPERLIPIDEMIA: ICD-10-CM

## 2022-03-04 DIAGNOSIS — I34.0 NON-RHEUMATIC MITRAL REGURGITATION: ICD-10-CM

## 2022-03-04 LAB
QT INTERVAL: 436 MS
QTC INTERVAL: 442 MS

## 2022-03-04 PROCEDURE — 93000 ELECTROCARDIOGRAM COMPLETE: CPT | Performed by: INTERNAL MEDICINE

## 2022-03-04 PROCEDURE — 99213 OFFICE O/P EST LOW 20 MIN: CPT | Performed by: INTERNAL MEDICINE

## 2022-03-04 RX ORDER — ENALAPRIL MALEATE 10 MG/1
5 TABLET ORAL DAILY PRN
Status: SHIPPED | COMMUNITY
Start: 2022-03-04

## 2022-03-04 RX ORDER — AMLODIPINE BESYLATE 10 MG/1
5 TABLET ORAL DAILY
Qty: 90 TABLET | Refills: 3 | Status: SHIPPED | OUTPATIENT
Start: 2022-03-04 | End: 2022-12-27 | Stop reason: SDUPTHER

## 2022-03-04 NOTE — PROGRESS NOTES
Fouzia Maldonado  71 y.o. female    03/04/2022  1. Essential hypertension    2. Non-rheumatic mitral regurgitation    3. Coronary artery disease involving native coronary artery of native heart without angina pectoris    4. Mixed hyperlipidemia        History of Present Illness:    71 years old patient recovered from COVID-19 but feeling lightheaded dizziness particular change in posture since that I have asked the patient increase the water intake now with salt palatable to taste.  No other symptoms reported with the patient.  Echocardiogram in November 2021 finding discussed with patient.  Preserved ventricular function with moderate mitral and tricuspid regurgitations. The patient with background history of fluctuating blood pressure, history of nonobstructive CAD on cardiac catheterization several years ago, history of GI bleed with AV malformation and previous normal stress test in 2017.  The patient was on Ranexa previously responded very well but lately having increasing chest discomfort.  No syncope or near syncopal episode reported.  She had a history of retinal detachment and is scheduled to have eye evaluations.  No orthopnea PND or syncopal episode reported.  No intermittent claudication dysuria or hematuria reported.  Previously have normal stress test with mild to moderate dilated left atrium.  No recent lipid profile     Echo 11/2021    · Left ventricular wall thickness is consistent with mild concentric hypertrophy.  · Estimated left ventricular EF = 61% Left ventricular ejection fraction appears to be 61 - 65%. Left ventricular systolic function is normal.  · Left ventricular diastolic function is consistent with (grade Ia w/high LAP) impaired relaxation.  · Moderate mitral valve regurgitation is present.  · Moderate tricuspid valve regurgitation is present.  · Estimated right ventricular systolic pressure from tricuspid regurgitation is mildly elevated (35-45 mmHg).  · Mild pulmonary  hypertension is present.        ECHO 1/2017  · Mild-to-moderate mitral valve regurgitation is present  · Left ventricular wall thickness is consistent with mild concentric hypertrophy.  · Mild pulmonic valve regurgitation is present.  · Estimated EF = 66%.  · Left atrial cavity size is mild-to-moderately dilated.        2017  · Myocardial perfusion imaging indicates a normal myocardial perfusion study with no evidence of ischemia.  · Impressions are consistent with a low risk study.  · Left ventricular ejection fraction is normal (Calculated EF = 61%        SUBJECTIVE:    Allergies   Allergen Reactions   • Amoxicillin Rash   • Darvon [Propoxyphene] Rash         Past Medical History:   Diagnosis Date   • Anemia    • Arthritis    • Coronary atherosclerosis of native coronary artery    • Depression    • GERD (gastroesophageal reflux disease)    • Hearing loss of both ears     can hear nothing out of left ear and has right ear loss as well   • Hyperlipidemia    • Hypertension    • Meniere disease    • Nonrheumatic mitral valve disorder    • Pancreatitis    • PONV (postoperative nausea and vomiting)    • Precordial pain    • Preprocedural cardiovascular examination    • Stomach ulcer    • Suicide attempt by drug ingestion (HCC) 1998   • Transfusion history          Past Surgical History:   Procedure Laterality Date   • CARDIAC CATHETERIZATION     • CHOLECYSTECTOMY WITH INTRAOPERATIVE CHOLANGIOGRAM N/A 3/30/2021    Procedure: LAPAROSCOPIC  CHOLECYSTECTOMY WITH INTRAOPERATIVE CHOLANGIOGRAM;  Surgeon: John Stratton MD;  Location: [facility];  Service: General;  Laterality: N/A;   • COLONOSCOPY     • ECHO - CONVERTED  03/02/2014    Normal LV systolic function with EF of 60%. Early diastolic dysfunction. Left atrium moderately dilated. Normal RV size and function. Trace mitral and mild tricuspid regurg. No intracardiac mass, pericardial effusion or cardiac thrombus seen   • ENDOSCOPY N/A 3/27/2017    Procedure:  ESOPHAGOGASTRODUODENOSCOPY WITH CONTROL OF BLEED;  Surgeon: Eladio Esquivel DO;  Location: [facility] ENDOSCOPY;  Service:    • ENDOSCOPY N/A 2018    Procedure: ESOPHAGOGASTRODUODENOSCOPY;  Surgeon: Eladio Esquivel DO;  Location: [facility] ENDOSCOPY;  Service: Gastroenterology   • ENDOSCOPY N/A 3/18/2021    Procedure: ESOPHAGOGASTRODUODENOSCOPY;  Surgeon: Eladio Esquivel DO;  Location: [facility] ENDOSCOPY;  Service: Gastroenterology;  Laterality: N/A;   • HYSTERECTOMY     • JOINT REPLACEMENT     • TOTAL KNEE ARTHROPLASTY Bilateral    • TUBAL ABDOMINAL LIGATION     • TUMOR REMOVAL Left     fatty tumor removed from left breast   • TUMOR REMOVAL Right     fatty tumor removal from the right shoulder         Family History   Problem Relation Age of Onset   • Heart disease Mother    • Cancer Mother         colon   • Early death Mother 68        colon cancer   • Hypertension Mother    • Hyperlipidemia Mother    • Heart disease Father    • Alcohol abuse Father    • Early death Father 56        heart attack   • Hypertension Father    • Hyperlipidemia Father    • Cancer Maternal Grandmother         stomach   • Cancer Maternal Grandfather    • Cancer Paternal Grandfather         stomach         Social History     Socioeconomic History   • Marital status:    Tobacco Use   • Smoking status: Former Smoker     Quit date:      Years since quittin.1   • Smokeless tobacco: Never Used   Vaping Use   • Vaping Use: Never used   Substance and Sexual Activity   • Alcohol use: No   • Drug use: No   • Sexual activity: Defer         Current Outpatient Medications   Medication Sig Dispense Refill   • amLODIPine (NORVASC) 10 MG tablet Take 1 tablet by mouth Daily. 90 tablet 3   • ascorbic acid (VITAMIN C) 1000 MG tablet Take 1 capsule by mouth Daily.     • aspirin 81 MG chewable tablet Chew 81 mg Daily.     • carvedilol (COREG) 3.125 MG tablet Take 1 tablet by mouth 2 (Two) Times a Day. 180 tablet 3   •  chlorthalidone (HYGROTON) 25 MG tablet      • Coenzyme Q10 200 MG capsule Take 1 tablet by mouth Daily.     • enalapril (VASOTEC) 10 MG tablet Take 1 tablet by mouth Daily As Needed (for SBP >160).     • FLUoxetine (PROzac) 20 MG capsule Take 20 mg by mouth Daily.     • fluticasone (FLONASE) 50 MCG/ACT nasal spray      • gabapentin (NEURONTIN) 800 MG tablet Take 800 mg by mouth 2 (Two) Times a Day. Patient takes .5 tab in the morning and 1 tablet at night     • gemfibrozil (Lopid) 600 MG tablet Take 600 mg by mouth 2 (Two) Times a Day Before Meals.     • hydrochlorothiazide (MICROZIDE) 12.5 MG capsule Take 12.5 mg by mouth Daily.     • HYDROcodone-acetaminophen (NORCO) 5-325 MG per tablet Take 1 tablet by mouth Every 4 (Four) Hours As Needed for Pain. 18 tablet 0   • isosorbide mononitrate (IMDUR) 30 MG 24 hr tablet Take 1 tablet by mouth Daily. 90 tablet 3   • Magnesium Hydroxide (MAGNESIA PO) Take 250 mg by mouth Daily.     • meclizine (ANTIVERT) 25 MG tablet      • meloxicam (MOBIC) 15 MG tablet Take 15 mg by mouth Daily.     • metoprolol tartrate (LOPRESSOR) 25 MG tablet Take one tablet the night before CTA.   Take one tablet the morning of CTA. 2 tablet 0   • Omega-3 Fatty Acids (fish oil) 1000 MG capsule capsule Take 1,000 mg by mouth Daily.     • omeprazole (priLOSEC) 40 MG capsule As Needed.     • ondansetron (ZOFRAN) 4 MG tablet Take 4 mg by mouth As Needed.     • pantoprazole (PROTONIX) 40 MG EC tablet Take 40 mg by mouth 2 (Two) Times a Day.     • Potassium 99 MG tablet Take 1 tablet by mouth Every Night.     • pravastatin (PRAVACHOL) 40 MG tablet Take 40 mg by mouth Daily.     • Probiotic Product (PROBIOTIC DAILY PO) Daily.     • sucralfate (CARAFATE) 1 g tablet Take 1 g by mouth 4 (Four) Times a Day.     • temazepam (RESTORIL) 30 MG capsule Take 30 mg by mouth Every Night.     • vitamin B-12 (CYANOCOBALAMIN) 1000 MCG tablet Take 1,000 mcg by mouth Daily.     • vitamin D (ERGOCALCIFEROL) 18518 UNITS  "capsule capsule Take 50,000 Units by mouth 1 (One) Time Per Week.       No current facility-administered medications for this visit.           Review of Systems:     Constitutional:  Denies recent weight loss, weight gain,no change in exercise tolerance.     HENT:  Denies any hearing loss, epistaxis    Eyes: No blurring    Respiratory: No COPD    Cardiovascular: See H&P    Gastrointestinal:  Denies change in bowel habits and dyspepsia    Endocrine: Negative for cold intolerance, heat intolerance, polydipsia    Genitourinary: Negative.      Musculoskeletal: History of osteoarthritis    Skin:  Deniesrashes, or skin lesions.     Allergic/Immunologic: Negative.  Negative for environmental allergies    Neurological:  Denies any history of recurrent headaches, strokes,     Hematological: Denies any food allergies, seasonal allergies    Psychiatric/Behavioral: Denies any history of depression        OBJECTIVE:    /68 (BP Location: Left arm, Patient Position: Sitting, Cuff Size: Adult)   Pulse 62   Temp 98.2 °F (36.8 °C)   Ht 162.6 cm (64\")   Wt 71.2 kg (157 lb)   SpO2 98%   BMI 26.95 kg/m²     Physical Exam:     Constitutional: Cooperative, alert and oriented, well-developed, well-nourished, in no acute distress.     HENT:   Head: Normocephalic, conjunctive is a pink, thyroid is nonpalpable no carotid bruit and trachea central.     Cardiovascular: Regular rhythm, S1 and S2 normal, no S3 or S4. Apical impulse not displaced. No murmurs    Pulmonary/Chest: Chest: No chest wall tenderness no rales and wheezing    Abdominal: Abdomen soft, bowel sounds normoactive, no masses,    Musculoskeletal: No deformities, clubbing, cyanosis, erythema.   Neurological: No gross motor or sensory deficits noted    Skin: Warm and dry to the touch, no apparent skin lesions .     Psychiatric: He has a normal mood and affect. His behavior is normal        Procedures      Lab Results   Component Value Date    WBC 7.96 03/16/2020    HGB " 13.9 03/16/2020    HCT 41.7 03/16/2020    MCV 86.9 03/16/2020     03/16/2020     Lab Results   Component Value Date    GLUCOSE 70 06/15/2021    BUN 8 06/15/2021    CREATININE 0.97 06/15/2021    EGFRIFNONA 56 (L) 06/15/2021    BCR 8.2 06/15/2021    CO2 27.0 06/15/2021    CALCIUM 9.6 06/15/2021    ALBUMIN 4.40 06/15/2021    AST 20 06/15/2021    ALT 8 06/15/2021     Lab Results   Component Value Date    CHOL 144 06/15/2021     Lab Results   Component Value Date    TRIG 104 06/15/2021     Lab Results   Component Value Date    HDL 55 06/15/2021     No components found for: LDLCALC  Lab Results   Component Value Date    LDL 70 06/15/2021     No results found for: HDLLDLRATIO  No components found for: CHOLHDL  No results found for: HGBA1C  Lab Results   Component Value Date    TSH 2.63 03/03/2014           ASSESSMENT AND PLAN:  #1 precordial chest pain with history of nonobstructive CAD     71 years old patient presented for routine follow-up no symptoms of cardiac decompensation reported.  She has history nonobstructive CAD no evidence of coronary chest pain.  She will continue present medication.     #2 hypertension with hypertensive heart disease     Patient postural dizziness as she is recovered from COVID-19.  I will decrease the dose of lisinopril from 10 to 5 mg and amlodipine from 10-5 and will discontinue diuretics.      Hyperlipidemia on gemfibrozil follow-up with family doctor    I spent 15 minutes caring for Fouzia on this date of service. This time includes time spent by me of counseling/coordination of care as relates to the presenting problem and any ordered procedures/tests as outlined above.       Electronically signed by Robbin Matthews MD, 03/04/22, 12:31 PM CST.    Diagnoses and all orders for this visit:    1. Essential hypertension (Primary)  -     ECG 12 Lead    2. Non-rheumatic mitral regurgitation    3. Coronary artery disease involving native coronary artery of native heart without angina  pectoris    4. Mixed hyperlipidemia          Robbin Matthews MD  3/4/2022  12:18 CST

## 2022-03-15 ENCOUNTER — TELEPHONE (OUTPATIENT)
Dept: CARDIOLOGY | Facility: CLINIC | Age: 72
End: 2022-03-15

## 2022-03-15 RX ORDER — CHLORTHALIDONE 25 MG/1
25 TABLET ORAL AS NEEDED
Qty: 90 TABLET | Refills: 3 | Status: SHIPPED | OUTPATIENT
Start: 2022-03-15 | End: 2022-03-18 | Stop reason: SDUPTHER

## 2022-03-15 NOTE — TELEPHONE ENCOUNTER
Returned patient call to inform her that we will talk to Dr. Matthews and call her back to let her know what he wants to do. She voiced her understanding.

## 2022-03-15 NOTE — TELEPHONE ENCOUNTER
Spoke with Dr. Matthews and he advised to put her back on the chlorthalidone 25 mg but PRN for swelling.   Informed pt and she voiced her understanding.

## 2022-03-15 NOTE — TELEPHONE ENCOUNTER
----- Message from Ailin Manrique sent at 3/15/2022  9:19 AM CDT -----  Contact: [phone]  Dr Matthews took her off of her fluid pill an dshe is having a lot of swelling in legs kness and feet- please advise    Message left on v/m today @ 9:12

## 2022-03-18 RX ORDER — CHLORTHALIDONE 25 MG/1
25 TABLET ORAL DAILY PRN
Qty: 90 TABLET | Refills: 3 | Status: SHIPPED | OUTPATIENT
Start: 2022-03-18 | End: 2022-09-13

## 2022-06-09 ENCOUNTER — CONSULT (OUTPATIENT)
Dept: ONCOLOGY | Facility: CLINIC | Age: 72
End: 2022-06-09

## 2022-06-09 VITALS
WEIGHT: 156.7 LBS | SYSTOLIC BLOOD PRESSURE: 149 MMHG | TEMPERATURE: 97.8 F | HEART RATE: 67 BPM | BODY MASS INDEX: 26.9 KG/M2 | DIASTOLIC BLOOD PRESSURE: 72 MMHG | OXYGEN SATURATION: 98 %

## 2022-06-09 DIAGNOSIS — D50.9 IRON DEFICIENCY ANEMIA, UNSPECIFIED IRON DEFICIENCY ANEMIA TYPE: Primary | ICD-10-CM

## 2022-06-09 PROCEDURE — G0463 HOSPITAL OUTPT CLINIC VISIT: HCPCS | Performed by: INTERNAL MEDICINE

## 2022-06-09 PROCEDURE — 99214 OFFICE O/P EST MOD 30 MIN: CPT | Performed by: INTERNAL MEDICINE

## 2022-06-09 RX ORDER — SODIUM CHLORIDE 9 MG/ML
250 INJECTION, SOLUTION INTRAVENOUS ONCE
Status: CANCELLED | OUTPATIENT
Start: 2022-06-16

## 2022-06-09 NOTE — PROGRESS NOTES
Subjective     Fouzia Maldonado seen in follow-up for iron deficiency anemia.  Patient reporting worsening fatigue and tiredness.  She has a known history of iron deficiency anemia in the past for which she was treated with IV iron in the past.  Outside labs reviewed which show iron deficiency anemia.  She is unable to tolerate oral iron due to GI side effect.  Risk versus benefit of IV iron discussed and she was in agreement.    Past Medical History, Past Surgical History, Social History, Family History have been reviewed and are without significant changes except as mentioned.        Medications:  The current medication list was reviewed in the EMR    ALLERGIES:    Allergies   Allergen Reactions   • Amoxicillin Rash   • Darvon [Propoxyphene] Rash       Objective      Vitals:    06/09/22 1338   BP: 149/72   Pulse: 67   Temp: 97.8 °F (36.6 °C)   SpO2: 98%         Current Status 12/26/2019   ECOG score 0       Physical Exam  Vitals and nursing note reviewed.   Constitutional:       Appearance: Normal appearance.   Skin:     Coloration: Skin is pale.   Neurological:      General: No focal deficit present.      Mental Status: She is alert and oriented to person, place, and time. Mental status is at baseline.   Psychiatric:         Mood and Affect: Mood normal.         Behavior: Behavior normal.         Thought Content: Thought content normal.               RECENT LABS:Independently reviewed and summarized  Hematology      Recent hemoglobin was 9.6.  MCV was low at 75.6.  Iron studies were checked on May 19, 2022 which showed iron saturation of 8 and ferritin of 11 indicating iron deficiency anemia.    Fouzia Maldonado reports a pain score of 4.  Given her pain assessment as noted, treatment options were discussed and the following options were decided upon as a follow-up plan to address the patient's pain: referral to Primary Care for assistance in pain treatment guidance.    Patient screened positive for  depression based on a PHQ-9 score of 1 on 6/9/2022. Follow-up recommendations include: Elevated PHQ score reflective of acute illness, not depression.    Advance Care Planning   ACP discussion was declined by the patient. Patient does not have an advance directive, declines further assistance.         Diagnosis:   (1) Iron deficiency anemia   (2) GI bleeding   (3) History of small bowel AVM, ulcers      Assessment & Plan     (1) Iron deficiency anemia     Chronic issue with exacerbation.  She is reporting worsening fatigue and tiredness.  Recent labs show iron deficiency anemia.  Likely secondary to occult GI bleeding.  She is unable to tolerate oral iron due to GI side effect.  Due to this reason I believe she would benefit from IV iron treatment.      I had an extensive discussion with patient about diagnosis and treatment options. I recommend that we replace their iron with IV Venofer -100 mg x 5 infusion.    I had an extensive discussion with the patient about risk versus benefits of IV iron treatment.    I discussed about various risks associated with IV iron such as allergic reaction, hypersensitivity reaction, headache, flushing, joint aches or pains, local IV infiltration and skin discoloration.  After our discussion the patient was in agreement in  proceeding with IV iron treatment for  anemia.    I will plan to recheck her back in 3 months with CBC, ferritin, iron profile.    (2) GI bleeding (3) History of small bowel AVM, ulcers    Chronic, stable.  Patient has history of GI bleeding from gastric ulcer as well as small bowel AVM.  She follows with GI.  No obvious bleeding.  Suspect occult GI bleeding.    6/9/2022      CC:

## 2022-06-13 ENCOUNTER — APPOINTMENT (OUTPATIENT)
Dept: ONCOLOGY | Facility: HOSPITAL | Age: 72
End: 2022-06-13

## 2022-06-13 ENCOUNTER — APPOINTMENT (OUTPATIENT)
Dept: ONCOLOGY | Facility: CLINIC | Age: 72
End: 2022-06-13

## 2022-06-17 ENCOUNTER — APPOINTMENT (OUTPATIENT)
Dept: ONCOLOGY | Facility: HOSPITAL | Age: 72
End: 2022-06-17

## 2022-06-20 ENCOUNTER — INFUSION (OUTPATIENT)
Dept: ONCOLOGY | Facility: HOSPITAL | Age: 72
End: 2022-06-20

## 2022-06-20 VITALS
RESPIRATION RATE: 20 BRPM | HEART RATE: 61 BPM | TEMPERATURE: 97 F | SYSTOLIC BLOOD PRESSURE: 120 MMHG | DIASTOLIC BLOOD PRESSURE: 61 MMHG

## 2022-06-20 DIAGNOSIS — D50.9 IRON DEFICIENCY ANEMIA, UNSPECIFIED IRON DEFICIENCY ANEMIA TYPE: Primary | ICD-10-CM

## 2022-06-20 PROCEDURE — 25010000002 IRON SUCROSE PER 1 MG: Performed by: INTERNAL MEDICINE

## 2022-06-20 PROCEDURE — 96365 THER/PROPH/DIAG IV INF INIT: CPT | Performed by: INTERNAL MEDICINE

## 2022-06-20 RX ORDER — SODIUM CHLORIDE 9 MG/ML
250 INJECTION, SOLUTION INTRAVENOUS ONCE
Status: CANCELLED | OUTPATIENT
Start: 2022-06-21

## 2022-06-20 RX ORDER — SODIUM CHLORIDE 9 MG/ML
250 INJECTION, SOLUTION INTRAVENOUS ONCE
Status: COMPLETED | OUTPATIENT
Start: 2022-06-20 | End: 2022-06-20

## 2022-06-20 RX ADMIN — SODIUM CHLORIDE 250 ML: 9 INJECTION, SOLUTION INTRAVENOUS at 10:28

## 2022-06-20 RX ADMIN — IRON SUCROSE 200 MG: 20 INJECTION, SOLUTION INTRAVENOUS at 10:38

## 2022-06-21 ENCOUNTER — INFUSION (OUTPATIENT)
Dept: ONCOLOGY | Facility: HOSPITAL | Age: 72
End: 2022-06-21

## 2022-06-21 VITALS
DIASTOLIC BLOOD PRESSURE: 59 MMHG | RESPIRATION RATE: 18 BRPM | SYSTOLIC BLOOD PRESSURE: 115 MMHG | TEMPERATURE: 97.8 F | HEART RATE: 68 BPM

## 2022-06-21 DIAGNOSIS — D50.9 IRON DEFICIENCY ANEMIA, UNSPECIFIED IRON DEFICIENCY ANEMIA TYPE: Primary | ICD-10-CM

## 2022-06-21 PROCEDURE — 96365 THER/PROPH/DIAG IV INF INIT: CPT | Performed by: INTERNAL MEDICINE

## 2022-06-21 PROCEDURE — 25010000002 IRON SUCROSE PER 1 MG: Performed by: INTERNAL MEDICINE

## 2022-06-21 RX ORDER — SODIUM CHLORIDE 9 MG/ML
250 INJECTION, SOLUTION INTRAVENOUS ONCE
Status: CANCELLED | OUTPATIENT
Start: 2022-06-22

## 2022-06-21 RX ORDER — SODIUM CHLORIDE 9 MG/ML
250 INJECTION, SOLUTION INTRAVENOUS ONCE
Status: COMPLETED | OUTPATIENT
Start: 2022-06-21 | End: 2022-06-21

## 2022-06-21 RX ADMIN — IRON SUCROSE 200 MG: 20 INJECTION, SOLUTION INTRAVENOUS at 13:23

## 2022-06-21 RX ADMIN — SODIUM CHLORIDE 250 ML: 9 INJECTION, SOLUTION INTRAVENOUS at 13:22

## 2022-06-23 ENCOUNTER — INFUSION (OUTPATIENT)
Dept: ONCOLOGY | Facility: HOSPITAL | Age: 72
End: 2022-06-23

## 2022-06-23 VITALS
TEMPERATURE: 97.2 F | RESPIRATION RATE: 16 BRPM | HEART RATE: 94 BPM | SYSTOLIC BLOOD PRESSURE: 127 MMHG | DIASTOLIC BLOOD PRESSURE: 65 MMHG

## 2022-06-23 DIAGNOSIS — D50.9 IRON DEFICIENCY ANEMIA, UNSPECIFIED IRON DEFICIENCY ANEMIA TYPE: Primary | ICD-10-CM

## 2022-06-23 PROCEDURE — 25010000002 IRON SUCROSE PER 1 MG: Performed by: INTERNAL MEDICINE

## 2022-06-23 PROCEDURE — 96365 THER/PROPH/DIAG IV INF INIT: CPT | Performed by: INTERNAL MEDICINE

## 2022-06-23 RX ORDER — SODIUM CHLORIDE 9 MG/ML
250 INJECTION, SOLUTION INTRAVENOUS ONCE
Status: COMPLETED | OUTPATIENT
Start: 2022-06-23 | End: 2022-06-23

## 2022-06-23 RX ORDER — SODIUM CHLORIDE 9 MG/ML
250 INJECTION, SOLUTION INTRAVENOUS ONCE
Status: CANCELLED | OUTPATIENT
Start: 2022-06-24

## 2022-06-23 RX ADMIN — IRON SUCROSE 200 MG: 20 INJECTION, SOLUTION INTRAVENOUS at 13:40

## 2022-06-23 RX ADMIN — SODIUM CHLORIDE 250 ML: 9 INJECTION, SOLUTION INTRAVENOUS at 13:40

## 2022-06-24 ENCOUNTER — INFUSION (OUTPATIENT)
Dept: ONCOLOGY | Facility: HOSPITAL | Age: 72
End: 2022-06-24

## 2022-06-24 VITALS
RESPIRATION RATE: 18 BRPM | SYSTOLIC BLOOD PRESSURE: 148 MMHG | HEART RATE: 67 BPM | TEMPERATURE: 98.1 F | OXYGEN SATURATION: 97 % | DIASTOLIC BLOOD PRESSURE: 72 MMHG

## 2022-06-24 DIAGNOSIS — D50.9 IRON DEFICIENCY ANEMIA, UNSPECIFIED IRON DEFICIENCY ANEMIA TYPE: Primary | ICD-10-CM

## 2022-06-24 PROCEDURE — 25010000002 IRON SUCROSE PER 1 MG: Performed by: INTERNAL MEDICINE

## 2022-06-24 PROCEDURE — 96365 THER/PROPH/DIAG IV INF INIT: CPT | Performed by: INTERNAL MEDICINE

## 2022-06-24 RX ORDER — SODIUM CHLORIDE 9 MG/ML
250 INJECTION, SOLUTION INTRAVENOUS ONCE
Status: CANCELLED | OUTPATIENT
Start: 2022-06-25

## 2022-06-24 RX ORDER — SODIUM CHLORIDE 9 MG/ML
250 INJECTION, SOLUTION INTRAVENOUS ONCE
Status: COMPLETED | OUTPATIENT
Start: 2022-06-24 | End: 2022-06-24

## 2022-06-24 RX ADMIN — SODIUM CHLORIDE 250 ML: 9 INJECTION, SOLUTION INTRAVENOUS at 13:27

## 2022-06-24 RX ADMIN — IRON SUCROSE 200 MG: 20 INJECTION, SOLUTION INTRAVENOUS at 13:28

## 2022-06-28 ENCOUNTER — INFUSION (OUTPATIENT)
Dept: ONCOLOGY | Facility: HOSPITAL | Age: 72
End: 2022-06-28

## 2022-06-28 VITALS
HEART RATE: 61 BPM | RESPIRATION RATE: 18 BRPM | TEMPERATURE: 97.4 F | SYSTOLIC BLOOD PRESSURE: 123 MMHG | DIASTOLIC BLOOD PRESSURE: 64 MMHG

## 2022-06-28 DIAGNOSIS — D50.9 IRON DEFICIENCY ANEMIA, UNSPECIFIED IRON DEFICIENCY ANEMIA TYPE: Primary | ICD-10-CM

## 2022-06-28 PROCEDURE — 96365 THER/PROPH/DIAG IV INF INIT: CPT | Performed by: INTERNAL MEDICINE

## 2022-06-28 PROCEDURE — 25010000002 IRON SUCROSE PER 1 MG: Performed by: INTERNAL MEDICINE

## 2022-06-28 RX ORDER — SODIUM CHLORIDE 9 MG/ML
250 INJECTION, SOLUTION INTRAVENOUS ONCE
Status: COMPLETED | OUTPATIENT
Start: 2022-06-28 | End: 2022-06-28

## 2022-06-28 RX ORDER — SODIUM CHLORIDE 9 MG/ML
250 INJECTION, SOLUTION INTRAVENOUS ONCE
Status: CANCELLED | OUTPATIENT
Start: 2022-06-28

## 2022-06-28 RX ADMIN — IRON SUCROSE 200 MG: 20 INJECTION, SOLUTION INTRAVENOUS at 14:17

## 2022-06-28 RX ADMIN — SODIUM CHLORIDE 250 ML: 9 INJECTION, SOLUTION INTRAVENOUS at 14:17

## 2022-09-08 ENCOUNTER — OFFICE VISIT (OUTPATIENT)
Dept: ONCOLOGY | Facility: CLINIC | Age: 72
End: 2022-09-08

## 2022-09-08 ENCOUNTER — LAB (OUTPATIENT)
Dept: ONCOLOGY | Facility: HOSPITAL | Age: 72
End: 2022-09-08

## 2022-09-08 VITALS
BODY MASS INDEX: 25.06 KG/M2 | RESPIRATION RATE: 18 BRPM | HEART RATE: 68 BPM | WEIGHT: 146 LBS | OXYGEN SATURATION: 99 % | SYSTOLIC BLOOD PRESSURE: 160 MMHG | DIASTOLIC BLOOD PRESSURE: 79 MMHG

## 2022-09-08 DIAGNOSIS — D50.9 IRON DEFICIENCY ANEMIA, UNSPECIFIED IRON DEFICIENCY ANEMIA TYPE: Primary | ICD-10-CM

## 2022-09-08 DIAGNOSIS — D50.9 IRON DEFICIENCY ANEMIA, UNSPECIFIED IRON DEFICIENCY ANEMIA TYPE: ICD-10-CM

## 2022-09-08 LAB
DEPRECATED RDW RBC AUTO: 42.9 FL (ref 37–54)
ERYTHROCYTE [DISTWIDTH] IN BLOOD BY AUTOMATED COUNT: 14.7 % (ref 12.3–15.4)
FERRITIN SERPL-MCNC: 81.53 NG/ML (ref 13–150)
HCT VFR BLD AUTO: 36.3 % (ref 34–46.6)
HGB BLD-MCNC: 12.6 G/DL (ref 12–15.9)
IRON 24H UR-MRATE: 62 MCG/DL (ref 37–145)
IRON SATN MFR SERPL: 20 % (ref 20–50)
MCH RBC QN AUTO: 28.4 PG (ref 26.6–33)
MCHC RBC AUTO-ENTMCNC: 34.7 G/DL (ref 31.5–35.7)
MCV RBC AUTO: 81.8 FL (ref 79–97)
PLATELET # BLD AUTO: 236 10*3/MM3 (ref 140–450)
PMV BLD AUTO: 9.6 FL (ref 6–12)
RBC # BLD AUTO: 4.44 10*6/MM3 (ref 3.77–5.28)
TIBC SERPL-MCNC: 308 MCG/DL (ref 298–536)
TRANSFERRIN SERPL-MCNC: 207 MG/DL (ref 200–360)
WBC NRBC COR # BLD: 6.05 10*3/MM3 (ref 3.4–10.8)

## 2022-09-08 PROCEDURE — 84466 ASSAY OF TRANSFERRIN: CPT

## 2022-09-08 PROCEDURE — 85027 COMPLETE CBC AUTOMATED: CPT

## 2022-09-08 PROCEDURE — 99213 OFFICE O/P EST LOW 20 MIN: CPT | Performed by: INTERNAL MEDICINE

## 2022-09-08 PROCEDURE — 83540 ASSAY OF IRON: CPT

## 2022-09-08 PROCEDURE — 82728 ASSAY OF FERRITIN: CPT

## 2022-09-08 PROCEDURE — G0463 HOSPITAL OUTPT CLINIC VISIT: HCPCS | Performed by: INTERNAL MEDICINE

## 2022-09-08 NOTE — PROGRESS NOTES
"Chief Complaint  Follow-up - iron deficiency anemia     Subjective        Fouzia Maldonado presents to [facility]  History of Present Illness     No new health issues since last visit.   No new medications.   No new hospitalization.   Feels well.       Objective   Vital Signs:  /79   Pulse 68   Resp 18   Wt 66.2 kg (146 lb)   SpO2 99%   BMI 25.06 kg/m²   Estimated body mass index is 25.06 kg/m² as calculated from the following:    Height as of 3/4/22: 162.6 cm (64\").    Weight as of this encounter: 66.2 kg (146 lb).          Physical Exam  Vitals and nursing note reviewed.   Constitutional:       Appearance: Normal appearance.   Neurological:      General: No focal deficit present.      Mental Status: She is alert and oriented to person, place, and time. Mental status is at baseline.   Psychiatric:         Mood and Affect: Mood normal.         Behavior: Behavior normal.         Thought Content: Thought content normal.        Result Review :  The following data was reviewed by: Shukri Jones MD on 09/08/2022:  Common labs    Common Labsle 9/8/22   WBC 6.05   Hemoglobin 12.6   Hematocrit 36.3   Platelets 236               CBC    CBC 9/8/22   WBC 6.05   RBC 4.44   Hemoglobin 12.6   Hematocrit 36.3   MCV 81.8   MCH 28.4   MCHC 34.7   RDW 14.7   Platelets 236           CBC w/diff    CBC w/Diff 9/8/22   WBC 6.05   RBC 4.44   Hemoglobin 12.6   Hematocrit 36.3   MCV 81.8   MCH 28.4   MCHC 34.7   RDW 14.7   Platelets 236           Fouzia Maldonado reports a pain score of 0.  Given her pain assessment as noted, treatment options were discussed and the following options were decided upon as a follow-up plan to address the patient's pain: continuation of current treatment plan for pain.    Patient screened negative for depression based on a PHQ-9 score of 0 on 9/8/2022.     Advance Care Planning   ACP discussion was declined by the patient. Patient " does not have an advance directive, declines further assistance.         Assessment and Plan   Diagnoses and all orders for this visit:    1. Iron deficiency anemia, unspecified iron deficiency anemia type (Primary)  -     CBC (No Diff); Standing  -     Ferritin; Future  -     Iron Profile; Future    Chronic, stable.  S/p IV iron treatment.  Anemia and iron studies have improved.  Iron deficiency anemia from upper GI bleeding from AVM/ulcers.   Continue monitoring.  Check CBC, ferritin, iron profile in 4 months.         Follow Up   No follow-ups on file.  Patient was given instructions and counseling regarding her condition or for health maintenance advice. Please see specific information pulled into the AVS if appropriate.

## 2022-09-13 ENCOUNTER — OFFICE VISIT (OUTPATIENT)
Dept: CARDIOLOGY | Facility: CLINIC | Age: 72
End: 2022-09-13

## 2022-09-13 VITALS
SYSTOLIC BLOOD PRESSURE: 126 MMHG | HEIGHT: 64 IN | BODY MASS INDEX: 24.92 KG/M2 | DIASTOLIC BLOOD PRESSURE: 80 MMHG | HEART RATE: 58 BPM | OXYGEN SATURATION: 98 % | WEIGHT: 146 LBS

## 2022-09-13 DIAGNOSIS — I34.0 NON-RHEUMATIC MITRAL REGURGITATION: ICD-10-CM

## 2022-09-13 DIAGNOSIS — I10 ESSENTIAL HYPERTENSION: Primary | ICD-10-CM

## 2022-09-13 DIAGNOSIS — I25.10 CORONARY ARTERY DISEASE INVOLVING NATIVE CORONARY ARTERY OF NATIVE HEART WITHOUT ANGINA PECTORIS: ICD-10-CM

## 2022-09-13 DIAGNOSIS — E78.2 MIXED HYPERLIPIDEMIA: ICD-10-CM

## 2022-09-13 PROCEDURE — 99213 OFFICE O/P EST LOW 20 MIN: CPT | Performed by: INTERNAL MEDICINE

## 2022-09-13 RX ORDER — HYDROCHLOROTHIAZIDE 12.5 MG/1
12.5 CAPSULE, GELATIN COATED ORAL DAILY
Qty: 90 CAPSULE | Refills: 3 | Status: SHIPPED | OUTPATIENT
Start: 2022-09-13

## 2022-09-13 RX ORDER — ISOSORBIDE MONONITRATE 30 MG/1
30 TABLET, EXTENDED RELEASE ORAL DAILY
Qty: 90 TABLET | Refills: 3 | Status: SHIPPED | OUTPATIENT
Start: 2022-09-13

## 2022-09-13 NOTE — PROGRESS NOTES
Fouzia Maldonado  72 y.o. female    9/13/2022     1. Essential hypertension    2. Mixed hyperlipidemia    3. Coronary artery disease involving native coronary artery of native heart without angina pectoris    4. Non-rheumatic mitral regurgitation        History of Present Illness:  72 years old patient presented today dated 9/13/2022 for routine follow-up with concurrent medical problem of nonobstructive CAD documented last time in June 2021 with CTA and previous of cardiac catheterization , history of GI bleed with AV malformation formation and hypertension.  The last echocardiogram November 2021 reported moderate mitral and tricuspid regurgitation with mild pulmonary hypertension and relaxation abnormality with preserved left ventricular systolic function mild concentric left ventricular hypertrophy.  No symptoms of cardiac decompensation such orthopnea PND chest pain lightheaded dizziness or intermittent claudication reported.    Echo November 2021      · Left ventricular wall thickness is consistent with mild concentric hypertrophy.  · Estimated left ventricular EF = 61% Left ventricular ejection fraction appears to be 61 - 65%. Left ventricular systolic function is normal.  · Left ventricular diastolic function is consistent with (grade Ia w/high LAP) impaired relaxation.  · Moderate mitral valve regurgitation is present.  · Moderate tricuspid valve regurgitation is present.  · Estimated right ventricular systolic pressure from tricuspid regurgitation is mildly elevated (35-45 mmHg).  · Mild pulmonary hypertension is present.      CTA June 2021    IMPRESSION:  CONCLUSION:   1.  Patient's calcium score is 704.This places the patent in the  high likelihood of at least one significant coronary narrowing  risk for coronary artery disease.  2.  RCA: Proximal calcified atherosclerotic plaque causing mild  stenosis of less than 50%. Proximal calcified/soft  atherosclerotic plaque causing moderate stenosis of  50-70%.  Multiple mid calcified atherosclerotic plaque causing mild  stenosis of less than 50%. Distal calcified atherosclerotic  plaque causing mild stenosis of less than 50%.  3.  Left Main: Calcified atherosclerotic plaque causing mild  stenosis of less than 50%. No other calcified or soft tissue  density plaque and/or stenosis.  4.  LAD: Proximal calcified atherosclerotic plaque causing mild  stenosis of less than 50%. Additional proximal calcified  atherosclerotic plaque causing mild stenosis of less than 50%. No  other calcified or soft tissue density plaque and/or stenosis.  5.  Hiatal hernia with extrusion of a portion of the stomach into  the inferior mediastinum.  6.  Remainder CT angiogram coronary exam is unremarkable.          ECHO 1/2017  · Mild-to-moderate mitral valve regurgitation is present  · Left ventricular wall thickness is consistent with mild concentric hypertrophy.  · Mild pulmonic valve regurgitation is present.  · Estimated EF = 66%.  · Left atrial cavity size is mild-to-moderately dilated.        2017  · Myocardial perfusion imaging indicates a normal myocardial perfusion study with no evidence of ischemia.  · Impressions are consistent with a low risk study.  · Left ventricular ejection fraction is normal (Calculated EF = 61%        SUBJECTIVE:    Allergies   Allergen Reactions   • Amoxicillin Rash   • Darvon [Propoxyphene] Rash         Past Medical History:   Diagnosis Date   • Anemia    • Arthritis    • Coronary atherosclerosis of native coronary artery    • Depression    • GERD (gastroesophageal reflux disease)    • Hearing loss of both ears     can hear nothing out of left ear and has right ear loss as well   • Hyperlipidemia    • Hypertension    • Meniere disease    • Nonrheumatic mitral valve disorder    • Pancreatitis    • PONV (postoperative nausea and vomiting)    • Precordial pain    • Preprocedural cardiovascular examination    • Stomach ulcer    • Suicide attempt by drug  ingestion (HCC) 1998   • Transfusion history          Past Surgical History:   Procedure Laterality Date   • CARDIAC CATHETERIZATION     • CHOLECYSTECTOMY WITH INTRAOPERATIVE CHOLANGIOGRAM N/A 3/30/2021    Procedure: LAPAROSCOPIC  CHOLECYSTECTOMY WITH INTRAOPERATIVE CHOLANGIOGRAM;  Surgeon: John Stratton MD;  Location: [facility] OR;  Service: General;  Laterality: N/A;   • COLONOSCOPY     • ECHO - CONVERTED  03/02/2014    Normal LV systolic function with EF of 60%. Early diastolic dysfunction. Left atrium moderately dilated. Normal RV size and function. Trace mitral and mild tricuspid regurg. No intracardiac mass, pericardial effusion or cardiac thrombus seen   • ENDOSCOPY N/A 3/27/2017    Procedure: ESOPHAGOGASTRODUODENOSCOPY WITH CONTROL OF BLEED;  Surgeon: Eladio Esquivel DO;  Location: [facility] ENDOSCOPY;  Service:    • ENDOSCOPY N/A 7/24/2018    Procedure: ESOPHAGOGASTRODUODENOSCOPY;  Surgeon: Eladio Esquivel DO;  Location: [facility] ENDOSCOPY;  Service: Gastroenterology   • ENDOSCOPY N/A 3/18/2021    Procedure: ESOPHAGOGASTRODUODENOSCOPY;  Surgeon: Eladio Esquivel DO;  Location: [facility] ENDOSCOPY;  Service: Gastroenterology;  Laterality: N/A;   • HYSTERECTOMY     • JOINT REPLACEMENT     • TOTAL KNEE ARTHROPLASTY Bilateral    • TUBAL ABDOMINAL LIGATION  1973   • TUMOR REMOVAL Left 1970    fatty tumor removed from left breast   • TUMOR REMOVAL Right 1971    fatty tumor removal from the right shoulder         Family History   Problem Relation Age of Onset   • Heart disease Mother    • Cancer Mother         colon   • Early death Mother 68        colon cancer   • Hypertension Mother    • Hyperlipidemia Mother    • Heart disease Father    • Alcohol abuse Father    • Early death Father 56        heart attack   • Hypertension Father    • Hyperlipidemia Father    • Cancer Maternal Grandmother         stomach   • Cancer Maternal Grandfather    • Cancer Paternal Grandfather         stomach         Social History      Socioeconomic History   • Marital status:    Tobacco Use   • Smoking status: Former Smoker     Quit date:      Years since quittin.7   • Smokeless tobacco: Never Used   Vaping Use   • Vaping Use: Never used   Substance and Sexual Activity   • Alcohol use: No   • Drug use: No   • Sexual activity: Defer         Current Outpatient Medications   Medication Sig Dispense Refill   • amLODIPine (NORVASC) 10 MG tablet Take 0.5 tablets by mouth Daily. 90 tablet 3   • ascorbic acid (VITAMIN C) 1000 MG tablet Take 1 capsule by mouth Daily.     • aspirin 81 MG chewable tablet Chew 81 mg Daily.     • carvedilol (COREG) 3.125 MG tablet Take 1 tablet by mouth 2 (Two) Times a Day. 180 tablet 3   • Coenzyme Q10 200 MG capsule Take 1 tablet by mouth Daily.     • enalapril (VASOTEC) 10 MG tablet Take 0.5 tablets by mouth Daily As Needed (for SBP >160).     • FLUoxetine (PROzac) 20 MG capsule Take 20 mg by mouth Daily.     • fluticasone (FLONASE) 50 MCG/ACT nasal spray      • gemfibrozil (Lopid) 600 MG tablet Take 600 mg by mouth 2 (Two) Times a Day Before Meals.     • hydroCHLOROthiazide (MICROZIDE) 12.5 MG capsule Take 1 capsule by mouth Daily. 90 capsule 3   • isosorbide mononitrate (IMDUR) 30 MG 24 hr tablet Take 1 tablet by mouth Daily. 90 tablet 3   • Magnesium Hydroxide (MAGNESIA PO) Take 250 mg by mouth Daily.     • meclizine (ANTIVERT) 25 MG tablet      • meloxicam (MOBIC) 15 MG tablet Take 15 mg by mouth Daily.     • metoprolol tartrate (LOPRESSOR) 25 MG tablet Take one tablet the night before CTA.   Take one tablet the morning of CTA. 2 tablet 0   • Omega-3 Fatty Acids (fish oil) 1000 MG capsule capsule Take 1,000 mg by mouth Daily.     • omeprazole (priLOSEC) 40 MG capsule As Needed.     • ondansetron (ZOFRAN) 4 MG tablet Take 4 mg by mouth As Needed.     • pantoprazole (PROTONIX) 40 MG EC tablet Take 40 mg by mouth 2 (Two) Times a Day.     • Potassium 99 MG tablet Take 1 tablet by mouth Every Night.    "  • pravastatin (PRAVACHOL) 40 MG tablet Take 40 mg by mouth Daily.     • Probiotic Product (PROBIOTIC DAILY PO) Daily.     • sucralfate (CARAFATE) 1 g tablet Take 1 g by mouth 4 (Four) Times a Day.     • temazepam (RESTORIL) 30 MG capsule Take 30 mg by mouth Every Night.     • vitamin B-12 (CYANOCOBALAMIN) 1000 MCG tablet Take 1,000 mcg by mouth Daily.     • vitamin D (ERGOCALCIFEROL) 68683 UNITS capsule capsule Take 50,000 Units by mouth 1 (One) Time Per Week.     • chlorthalidone (HYGROTON) 25 MG tablet Take 1 tablet by mouth Daily As Needed (prn due to dizzyness/swelling). 90 tablet 3   • gabapentin (NEURONTIN) 800 MG tablet Take 800 mg by mouth 2 (Two) Times a Day. Patient takes .5 tab in the morning and 1 tablet at night     • HYDROcodone-acetaminophen (NORCO) 5-325 MG per tablet Take 1 tablet by mouth Every 4 (Four) Hours As Needed for Pain. 18 tablet 0     No current facility-administered medications for this visit.           Review of Systems:   No significant change was noted in the review of system dated 9/13/2022 compared to previous  Constitutional:  Denies recent weight loss, weight gain,no change in exercise tolerance.     HENT:  Denies any hearing loss, epistaxis    Eyes: No blurring    Respiratory: No COPD    Cardiovascular: See H&P    Gastrointestinal:  Denies change in bowel habits and dyspepsia    Endocrine: Negative for cold intolerance, heat intolerance, polydipsia    Genitourinary: Negative.      Musculoskeletal: History of osteoarthritis    Skin:  Deniesrashes, or skin lesions.     Allergic/Immunologic: Negative.  Negative for environmental allergies    Neurological:  Denies any history of recurrent headaches, strokes,     Hematological: Denies any food allergies, seasonal allergies    Psychiatric/Behavioral: Denies any history of depression        OBJECTIVE:    /80 (BP Location: Left arm, Patient Position: Sitting, Cuff Size: Adult)   Pulse 58   Ht 162.6 cm (64\")   Wt 66.2 kg (146 " lb)   SpO2 98%   BMI 25.06 kg/m²     Physical Exam:   Significant change was noted in physical exam dated 9/13/2022 compared to previous  Constitutional: Cooperative, alert and oriented, well-developed, well-nourished, in no acute distress.     HENT:   Head: Normocephalic, conjunctive is a pink, thyroid is nonpalpable no carotid bruit and trachea central.     Cardiovascular: Regular rhythm, S1 and S2 normal, no S3 or S4. Apical impulse not displaced. No murmurs    Pulmonary/Chest: Chest: No chest wall tenderness no rales and wheezing    Abdominal: Abdomen soft, bowel sounds normoactive, no masses,    Musculoskeletal: No deformities, clubbing, cyanosis, erythema.   Neurological: No gross motor or sensory deficits noted    Skin: Warm and dry to the touch, no apparent skin lesions .     Psychiatric: He has a normal mood and affect. His behavior is normal        Procedures      Lab Results   Component Value Date    WBC 6.05 09/08/2022    HGB 12.6 09/08/2022    HCT 36.3 09/08/2022    MCV 81.8 09/08/2022     09/08/2022     Lab Results   Component Value Date    GLUCOSE 70 06/15/2021    BUN 8 06/15/2021    CREATININE 0.97 06/15/2021    EGFRIFNONA 56 (L) 06/15/2021    BCR 8.2 06/15/2021    CO2 27.0 06/15/2021    CALCIUM 9.6 06/15/2021    ALBUMIN 4.40 06/15/2021    AST 20 06/15/2021    ALT 8 06/15/2021     Lab Results   Component Value Date    CHOL 144 06/15/2021     Lab Results   Component Value Date    TRIG 104 06/15/2021     Lab Results   Component Value Date    HDL 55 06/15/2021     No components found for: LDLCALC  Lab Results   Component Value Date    LDL 70 06/15/2021     No results found for: HDLLDLRATIO  No components found for: CHOLHDL  No results found for: HGBA1C  Lab Results   Component Value Date    TSH 2.63 03/03/2014           ASSESSMENT AND PLAN:  #1  CAD     32 years old patient have a documented history of nonobstructive CAD by cardiac catheterization several years ago and due to chest discomfort  CT coronary angiogram was performed in June 2021 again revealed nonobstructive CAD.  She is a pleased with her clinical outcome      continue amlodipine, Lopid/statin, aspirin, Coreg and Imdur    #2 hypertension with echocardiographic and some mild concentric left trickle hypertrophy     She will continue metoprolol continue amlodipine, carvedilol, Imdur and Vasotec with decent blood pressure      Hyperlipidemia on gemfibrozil follow-up with family doctor    Moderate mitral tricuspid regurgitation    No surgical indication at this stage we will follow with periodic echo as per recommendation    I spent 17 minutes caring for Fouzia on this date of service. This time includes time spent by me of counseling/coordination of care as relates to the presenting problem and any ordered procedures/tests as outlined above.         This document has been electronically signed by Robbin Matthews MD on September 13, 2022 14:36 CDT        Diagnoses and all orders for this visit:    1. Essential hypertension (Primary)    2. Mixed hyperlipidemia    3. Coronary artery disease involving native coronary artery of native heart without angina pectoris    4. Non-rheumatic mitral regurgitation    Other orders  -     isosorbide mononitrate (IMDUR) 30 MG 24 hr tablet; Take 1 tablet by mouth Daily.  Dispense: 90 tablet; Refill: 3  -     hydroCHLOROthiazide (MICROZIDE) 12.5 MG capsule; Take 1 capsule by mouth Daily.  Dispense: 90 capsule; Refill: 3          Robbin Matthews MD  9/13/2022  14:36 CDT

## 2022-10-10 ENCOUNTER — TELEPHONE (OUTPATIENT)
Dept: CARDIOLOGY | Facility: CLINIC | Age: 72
End: 2022-10-10

## 2022-10-10 NOTE — TELEPHONE ENCOUNTER
----- Message from Robbin Matthews MD sent at 10/10/2022  1:22 PM CDT -----  Regarding: RE: call back  Contact: [phone]  Not meds call pcp  ----- Message -----  From: Epley, Kendall, MA  Sent: 10/10/2022   1:08 PM CDT  To: Robbin Matthews MD  Subject: FW: call back                                      ----- Message -----  From: Edilma Jensen  Sent: 10/10/2022   1:02 PM CDT  To: Kendall Epley, MA  Subject: call back                                        Fouzia Maldonado  46 stated her feet is swollen, and wanted to know if it was cause by the medication Hydrochlorothiazide 12.5 mg. She wanted a call back @ [phone] to talk about this. Thxs

## 2022-10-10 NOTE — TELEPHONE ENCOUNTER
Contacted patient to inform her that per Dr. Matthews he does not think that it is her HCTZ causing the swelling and he advised for her to call PCP. She voiced her understanding.

## 2022-12-27 RX ORDER — AMLODIPINE BESYLATE 10 MG/1
5 TABLET ORAL DAILY
Qty: 45 TABLET | Refills: 3 | Status: SHIPPED | OUTPATIENT
Start: 2022-12-27

## 2023-01-12 ENCOUNTER — OFFICE VISIT (OUTPATIENT)
Dept: ONCOLOGY | Facility: CLINIC | Age: 73
End: 2023-01-12
Payer: MEDICARE

## 2023-01-12 ENCOUNTER — LAB (OUTPATIENT)
Dept: ONCOLOGY | Facility: HOSPITAL | Age: 73
End: 2023-01-12
Payer: MEDICARE

## 2023-01-12 ENCOUNTER — TELEPHONE (OUTPATIENT)
Dept: ONCOLOGY | Facility: CLINIC | Age: 73
End: 2023-01-12

## 2023-01-12 ENCOUNTER — TELEPHONE (OUTPATIENT)
Dept: ONCOLOGY | Facility: HOSPITAL | Age: 73
End: 2023-01-12
Payer: MEDICARE

## 2023-01-12 VITALS
HEART RATE: 86 BPM | DIASTOLIC BLOOD PRESSURE: 65 MMHG | BODY MASS INDEX: 25.75 KG/M2 | OXYGEN SATURATION: 94 % | SYSTOLIC BLOOD PRESSURE: 126 MMHG | RESPIRATION RATE: 18 BRPM | WEIGHT: 150 LBS

## 2023-01-12 DIAGNOSIS — D50.9 IRON DEFICIENCY ANEMIA, UNSPECIFIED IRON DEFICIENCY ANEMIA TYPE: Primary | ICD-10-CM

## 2023-01-12 DIAGNOSIS — D50.9 IRON DEFICIENCY ANEMIA, UNSPECIFIED IRON DEFICIENCY ANEMIA TYPE: ICD-10-CM

## 2023-01-12 LAB
DEPRECATED RDW RBC AUTO: 37.2 FL (ref 37–54)
ERYTHROCYTE [DISTWIDTH] IN BLOOD BY AUTOMATED COUNT: 12.6 % (ref 12.3–15.4)
FERRITIN SERPL-MCNC: 34.19 NG/ML (ref 13–150)
HCT VFR BLD AUTO: 36.4 % (ref 34–46.6)
HGB BLD-MCNC: 12.5 G/DL (ref 12–15.9)
HOLD SPECIMEN: NORMAL
IRON 24H UR-MRATE: 62 MCG/DL (ref 37–145)
IRON SATN MFR SERPL: 17 % (ref 20–50)
MCH RBC QN AUTO: 28 PG (ref 26.6–33)
MCHC RBC AUTO-ENTMCNC: 34.3 G/DL (ref 31.5–35.7)
MCV RBC AUTO: 81.4 FL (ref 79–97)
PLATELET # BLD AUTO: 285 10*3/MM3 (ref 140–450)
PMV BLD AUTO: 9.7 FL (ref 6–12)
RBC # BLD AUTO: 4.47 10*6/MM3 (ref 3.77–5.28)
TIBC SERPL-MCNC: 374 MCG/DL (ref 298–536)
TRANSFERRIN SERPL-MCNC: 251 MG/DL (ref 200–360)
WBC NRBC COR # BLD: 7.35 10*3/MM3 (ref 3.4–10.8)

## 2023-01-12 PROCEDURE — 85027 COMPLETE CBC AUTOMATED: CPT

## 2023-01-12 PROCEDURE — 83540 ASSAY OF IRON: CPT

## 2023-01-12 PROCEDURE — G0463 HOSPITAL OUTPT CLINIC VISIT: HCPCS | Performed by: INTERNAL MEDICINE

## 2023-01-12 PROCEDURE — 84466 ASSAY OF TRANSFERRIN: CPT

## 2023-01-12 PROCEDURE — 82728 ASSAY OF FERRITIN: CPT

## 2023-01-12 PROCEDURE — 99214 OFFICE O/P EST MOD 30 MIN: CPT | Performed by: INTERNAL MEDICINE

## 2023-01-12 RX ORDER — FAMOTIDINE 10 MG/ML
20 INJECTION, SOLUTION INTRAVENOUS AS NEEDED
Status: CANCELLED | OUTPATIENT
Start: 2023-01-19

## 2023-01-12 RX ORDER — DIPHENHYDRAMINE HYDROCHLORIDE 50 MG/ML
50 INJECTION INTRAMUSCULAR; INTRAVENOUS AS NEEDED
Status: CANCELLED | OUTPATIENT
Start: 2023-01-19

## 2023-01-12 RX ORDER — SODIUM CHLORIDE 9 MG/ML
250 INJECTION, SOLUTION INTRAVENOUS ONCE
Status: CANCELLED | OUTPATIENT
Start: 2023-01-19

## 2023-01-12 NOTE — PROGRESS NOTES
"Chief Complaint  Follow-up - iron deficiency anemia     Subjective        Fouzia Maldonado presents to [facility]  History of Present Illness     No new health issues since last visit.   No new medications.   No new hospitalization.   Feels well.       Objective   Vital Signs:  /65   Pulse 86   Resp 18   Wt 68 kg (150 lb)   SpO2 94%   BMI 25.75 kg/m²   Estimated body mass index is 25.75 kg/m² as calculated from the following:    Height as of 9/13/22: 162.6 cm (64\").    Weight as of this encounter: 68 kg (150 lb).             Physical Exam  Vitals and nursing note reviewed.   Constitutional:       Appearance: Normal appearance.   Neurological:      General: No focal deficit present.      Mental Status: She is alert and oriented to person, place, and time. Mental status is at baseline.   Psychiatric:         Mood and Affect: Mood normal.         Behavior: Behavior normal.         Thought Content: Thought content normal.        Result Review :  The following data was reviewed by: Shukri Jones MD on 01/12/2023:  Common labs    Common Labs 9/8/22 1/12/23   WBC 6.05 7.35   Hemoglobin 12.6 12.5   Hematocrit 36.3 36.4   Platelets 236 285               CBC    CBC 9/8/22 1/12/23   WBC 6.05 7.35   RBC 4.44 4.47   Hemoglobin 12.6 12.5   Hematocrit 36.3 36.4   MCV 81.8 81.4   MCH 28.4 28.0   MCHC 34.7 34.3   RDW 14.7 12.6   Platelets 236 285           CBC w/diff    CBC w/Diff 9/8/22 1/12/23   WBC 6.05 7.35   RBC 4.44 4.47   Hemoglobin 12.6 12.5   Hematocrit 36.3 36.4   MCV 81.8 81.4   MCH 28.4 28.0   MCHC 34.7 34.3   RDW 14.7 12.6   Platelets 236 285           Anemia labs:      Lab 01/12/23  1341   IRON 62   IRON SATURATION 17*   TIBC 374   TRANSFERRIN 251   FERRITIN 34.19       Fouzia Maldonado reports a pain score of 0.  Given her pain assessment as noted, treatment options were discussed and the following options were decided upon as a follow-up " plan to address the patient's pain: continuation of current treatment plan for pain.    Patient screened negative for depression based on a PHQ-9 score of 0 on 1/12/2023.     Advance Care Planning   ACP discussion was declined by the patient. Patient does not have an advance directive, declines further assistance.         Assessment and Plan   Diagnoses and all orders for this visit:    1. Iron deficiency anemia, unspecified iron deficiency anemia type (Primary)  -     CBC (No Diff); Future  -     Ferritin; Future  -     Iron Profile; Future    Chronic issue with exacerbation.  Iron deficiency is worse compared to last visit.  Iron saturation is down to 17.  Ferritin is also lower at 34.  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  She is unable to tolerate oral iron.  Due to this reason I believe she will benefit from IV iron treatment.    I had an extensive discussion with patient about diagnosis and treatment options. I recommend that we replace their iron with IV Venofer - 200x 2 infusions.     I had an extensive discussion with the patient about risk versus benefits of IV iron treatment.    I discussed about various risks associated with IV iron such as allergic reaction, hypersensitivity reaction, headache, flushing, joint aches or pains, local IV infiltration and skin discoloration.  After our discussion the patient was in agreement in  proceeding with IV iron treatment for iron deficiency.      I will check CBC, ferritin, iron profile in 4 months.    Other orders  -     sodium chloride 0.9 % infusion 250 mL  -     iron sucrose (VENOFER) 200 mg in sodium chloride 0.9 % 100 mL IVPB  -     Hydrocortisone Sod Suc (PF) (Solu-CORTEF) injection 50 mg  -     Hydrocortisone Sod Suc (PF) (Solu-CORTEF) injection 100 mg  -     diphenhydrAMINE (BENADRYL) injection 50 mg  -     famotidine (PEPCID) injection 20 mg             Follow Up   No  follow-ups on file.  Patient was given instructions and counseling regarding her condition or for health maintenance advice. Please see specific information pulled into the AVS if appropriate.

## 2023-01-12 NOTE — TELEPHONE ENCOUNTER
Spoke with pt regarding lab work and need for IV iron. PT is agreeable to iron plan. Informed office will be calling. PT verbalizes understanding and denies any further questions.

## 2023-01-12 NOTE — TELEPHONE ENCOUNTER
----- Message from Shukri Jones MD sent at 1/12/2023  2:27 PM CST -----  Iron is mildly low. Arrange for 2 venofer infusions.

## 2023-01-13 ENCOUNTER — TELEPHONE (OUTPATIENT)
Dept: ONCOLOGY | Facility: CLINIC | Age: 73
End: 2023-01-13
Payer: MEDICARE

## 2023-01-16 RX ORDER — CARVEDILOL 3.12 MG/1
3.12 TABLET ORAL 2 TIMES DAILY
Qty: 180 TABLET | Refills: 3 | Status: SHIPPED | OUTPATIENT
Start: 2023-01-16

## 2023-01-24 ENCOUNTER — INFUSION (OUTPATIENT)
Dept: ONCOLOGY | Facility: HOSPITAL | Age: 73
End: 2023-01-24
Payer: MEDICARE

## 2023-01-24 VITALS
SYSTOLIC BLOOD PRESSURE: 133 MMHG | DIASTOLIC BLOOD PRESSURE: 71 MMHG | HEART RATE: 61 BPM | RESPIRATION RATE: 18 BRPM | TEMPERATURE: 97 F

## 2023-01-24 DIAGNOSIS — D50.9 IRON DEFICIENCY ANEMIA, UNSPECIFIED IRON DEFICIENCY ANEMIA TYPE: Primary | ICD-10-CM

## 2023-01-24 PROCEDURE — 96365 THER/PROPH/DIAG IV INF INIT: CPT | Performed by: INTERNAL MEDICINE

## 2023-01-24 PROCEDURE — 25010000002 IRON SUCROSE PER 1 MG: Performed by: INTERNAL MEDICINE

## 2023-01-24 RX ORDER — SODIUM CHLORIDE 9 MG/ML
250 INJECTION, SOLUTION INTRAVENOUS ONCE
Status: CANCELLED | OUTPATIENT
Start: 2023-01-24

## 2023-01-24 RX ORDER — FAMOTIDINE 10 MG/ML
20 INJECTION, SOLUTION INTRAVENOUS AS NEEDED
Status: CANCELLED | OUTPATIENT
Start: 2023-01-24

## 2023-01-24 RX ORDER — DIPHENHYDRAMINE HYDROCHLORIDE 50 MG/ML
50 INJECTION INTRAMUSCULAR; INTRAVENOUS AS NEEDED
Status: DISCONTINUED | OUTPATIENT
Start: 2023-01-24 | End: 2023-01-24 | Stop reason: HOSPADM

## 2023-01-24 RX ORDER — SODIUM CHLORIDE 9 MG/ML
250 INJECTION, SOLUTION INTRAVENOUS ONCE
Status: COMPLETED | OUTPATIENT
Start: 2023-01-24 | End: 2023-01-24

## 2023-01-24 RX ORDER — DIPHENHYDRAMINE HYDROCHLORIDE 50 MG/ML
50 INJECTION INTRAMUSCULAR; INTRAVENOUS AS NEEDED
Status: CANCELLED | OUTPATIENT
Start: 2023-01-24

## 2023-01-24 RX ORDER — FAMOTIDINE 10 MG/ML
20 INJECTION, SOLUTION INTRAVENOUS AS NEEDED
Status: DISCONTINUED | OUTPATIENT
Start: 2023-01-24 | End: 2023-01-24 | Stop reason: HOSPADM

## 2023-01-24 RX ADMIN — SODIUM CHLORIDE 250 ML: 9 INJECTION, SOLUTION INTRAVENOUS at 13:49

## 2023-01-24 RX ADMIN — IRON SUCROSE 200 MG: 20 INJECTION, SOLUTION INTRAVENOUS at 13:49

## 2023-01-26 ENCOUNTER — INFUSION (OUTPATIENT)
Dept: ONCOLOGY | Facility: HOSPITAL | Age: 73
End: 2023-01-26
Payer: MEDICARE

## 2023-01-26 VITALS — TEMPERATURE: 96.6 F | HEART RATE: 74 BPM | DIASTOLIC BLOOD PRESSURE: 70 MMHG | SYSTOLIC BLOOD PRESSURE: 159 MMHG

## 2023-01-26 DIAGNOSIS — D50.9 IRON DEFICIENCY ANEMIA, UNSPECIFIED IRON DEFICIENCY ANEMIA TYPE: Primary | ICD-10-CM

## 2023-01-26 PROCEDURE — 96365 THER/PROPH/DIAG IV INF INIT: CPT | Performed by: INTERNAL MEDICINE

## 2023-01-26 PROCEDURE — 25010000002 IRON SUCROSE PER 1 MG: Performed by: INTERNAL MEDICINE

## 2023-01-26 RX ORDER — SODIUM CHLORIDE 9 MG/ML
250 INJECTION, SOLUTION INTRAVENOUS ONCE
Status: CANCELLED | OUTPATIENT
Start: 2023-01-26

## 2023-01-26 RX ORDER — DIPHENHYDRAMINE HYDROCHLORIDE 50 MG/ML
50 INJECTION INTRAMUSCULAR; INTRAVENOUS AS NEEDED
Status: DISCONTINUED | OUTPATIENT
Start: 2023-01-26 | End: 2023-01-26 | Stop reason: HOSPADM

## 2023-01-26 RX ORDER — FAMOTIDINE 10 MG/ML
20 INJECTION, SOLUTION INTRAVENOUS AS NEEDED
Status: DISCONTINUED | OUTPATIENT
Start: 2023-01-26 | End: 2023-01-26 | Stop reason: HOSPADM

## 2023-01-26 RX ORDER — FAMOTIDINE 10 MG/ML
20 INJECTION, SOLUTION INTRAVENOUS AS NEEDED
Status: CANCELLED | OUTPATIENT
Start: 2023-01-26

## 2023-01-26 RX ORDER — SODIUM CHLORIDE 9 MG/ML
250 INJECTION, SOLUTION INTRAVENOUS ONCE
Status: COMPLETED | OUTPATIENT
Start: 2023-01-26 | End: 2023-01-26

## 2023-01-26 RX ORDER — DIPHENHYDRAMINE HYDROCHLORIDE 50 MG/ML
50 INJECTION INTRAMUSCULAR; INTRAVENOUS AS NEEDED
Status: CANCELLED | OUTPATIENT
Start: 2023-01-26

## 2023-01-26 RX ADMIN — SODIUM CHLORIDE 250 ML: 9 INJECTION, SOLUTION INTRAVENOUS at 13:38

## 2023-01-26 RX ADMIN — IRON SUCROSE 200 MG: 20 INJECTION, SOLUTION INTRAVENOUS at 13:38

## 2023-03-20 ENCOUNTER — OFFICE VISIT (OUTPATIENT)
Dept: CARDIOLOGY | Facility: CLINIC | Age: 73
End: 2023-03-20
Payer: MEDICARE

## 2023-03-20 VITALS
SYSTOLIC BLOOD PRESSURE: 140 MMHG | TEMPERATURE: 98 F | BODY MASS INDEX: 24.69 KG/M2 | OXYGEN SATURATION: 98 % | DIASTOLIC BLOOD PRESSURE: 72 MMHG | WEIGHT: 144.6 LBS | HEIGHT: 64 IN | HEART RATE: 64 BPM

## 2023-03-20 DIAGNOSIS — E78.2 MIXED HYPERLIPIDEMIA: ICD-10-CM

## 2023-03-20 DIAGNOSIS — R06.09 DOE (DYSPNEA ON EXERTION): ICD-10-CM

## 2023-03-20 DIAGNOSIS — R42 LIGHTHEADED: Primary | ICD-10-CM

## 2023-03-20 DIAGNOSIS — I25.10 CORONARY ARTERY DISEASE INVOLVING NATIVE CORONARY ARTERY OF NATIVE HEART WITHOUT ANGINA PECTORIS: ICD-10-CM

## 2023-03-20 DIAGNOSIS — I10 ESSENTIAL HYPERTENSION: ICD-10-CM

## 2023-03-20 DIAGNOSIS — R06.00 DYSPNEA, UNSPECIFIED TYPE: ICD-10-CM

## 2023-03-20 RX ORDER — GABAPENTIN 100 MG/1
200 CAPSULE ORAL 3 TIMES DAILY
COMMUNITY
Start: 2023-03-16

## 2023-03-20 NOTE — PROGRESS NOTES
[facility]  OFFICE NOTE    Cardiovascular Medicine          No referring provider defined for this encounter.    Thank you for asking me to see Fouzia Maldonado for CAD    History of Present Illness  This is a 72 y.o. female with new lightheadedness she reports since last visit and noted with ADL , reports positional at times and also presnt at rest with nausea no voimitng every :2-3 per week lasting few minutes and resolved with rest   No exercise just walking   Compliant with meds        Review of Systems - ROS  Constitution: Negative for weakness, weight gain and weight loss.   HENT: Negative for congestion.    Eyes: Negative for blurred vision.   Cardiovascular: As mentioned above  Respiratory: Negative for cough and hemoptysis.    Endocrine: Negative for polydipsia and polyuria.   Hematologic/Lymphatic: Negative for bleeding problem. Does not bruise/bleed easily.   Skin: Negative for flushing.   Musculoskeletal: Negative for neck pain and stiffness.   Gastrointestinal: Negative for abdominal pain, diarrhea, jaundice, melena, nausea and vomiting.   Genitourinary: Negative for dysuria and hematuria.   Neurological: Negative for dizziness, focal weakness and numbness.   Psychiatric/Behavioral: Negative for altered mental status and depression.          All other systems were reviewed and were negative.    family history includes Alcohol abuse in her father; Cancer in her maternal grandfather, maternal grandmother, mother, and paternal grandfather; Early death (age of onset: 56) in her father; Early death (age of onset: 68) in her mother; Heart disease in her father and mother; Hyperlipidemia in her father and mother; Hypertension in her father and mother.     reports that she quit smoking about 17 years ago. Her smoking use included cigarettes. She has never used smokeless tobacco. She reports that she does not drink alcohol and does not use drugs.    Allergies   Allergen Reactions    • Amoxicillin Rash   • Darvon [Propoxyphene] Rash         Current Outpatient Medications:   •  amLODIPine (NORVASC) 10 MG tablet, Take 0.5 tablets by mouth Daily., Disp: 45 tablet, Rfl: 3  •  ascorbic acid (VITAMIN C) 1000 MG tablet, Take 1 capsule by mouth Daily., Disp: , Rfl:   •  aspirin 81 MG chewable tablet, Chew 1 tablet Daily., Disp: , Rfl:   •  carvedilol (COREG) 3.125 MG tablet, Take 1 tablet by mouth 2 (Two) Times a Day., Disp: 180 tablet, Rfl: 3  •  Coenzyme Q10 200 MG capsule, Take 1 tablet by mouth Daily., Disp: , Rfl:   •  enalapril (VASOTEC) 10 MG tablet, Take 0.5 tablets by mouth Daily As Needed (for SBP >160)., Disp: , Rfl:   •  FLUoxetine (PROzac) 20 MG capsule, Take 1 capsule by mouth Daily., Disp: , Rfl:   •  fluticasone (FLONASE) 50 MCG/ACT nasal spray, , Disp: , Rfl:   •  gabapentin (NEURONTIN) 100 MG capsule, Take 2 capsules by mouth 3 (Three) Times a Day., Disp: , Rfl:   •  gemfibrozil (Lopid) 600 MG tablet, Take 1 tablet by mouth 2 (Two) Times a Day Before Meals., Disp: , Rfl:   •  hydroCHLOROthiazide (MICROZIDE) 12.5 MG capsule, Take 1 capsule by mouth Daily., Disp: 90 capsule, Rfl: 3  •  isosorbide mononitrate (IMDUR) 30 MG 24 hr tablet, Take 1 tablet by mouth Daily., Disp: 90 tablet, Rfl: 3  •  Magnesium Hydroxide (MAGNESIA PO), Take 250 mg by mouth Daily., Disp: , Rfl:   •  meclizine (ANTIVERT) 25 MG tablet, , Disp: , Rfl:   •  meloxicam (MOBIC) 15 MG tablet, Take 1 tablet by mouth Daily., Disp: , Rfl:   •  metoprolol tartrate (LOPRESSOR) 25 MG tablet, Take one tablet the night before CTA.  Take one tablet the morning of CTA., Disp: 2 tablet, Rfl: 0  •  Omega-3 Fatty Acids (fish oil) 1000 MG capsule capsule, Take 1 capsule by mouth Daily., Disp: , Rfl:   •  omeprazole (priLOSEC) 40 MG capsule, As Needed., Disp: , Rfl:   •  ondansetron (ZOFRAN) 4 MG tablet, Take 1 tablet by mouth As Needed., Disp: , Rfl:   •  pantoprazole (PROTONIX) 40 MG EC tablet, Take 1 tablet by mouth 2 (Two) Times  "a Day., Disp: , Rfl:   •  Potassium 99 MG tablet, Take 1 tablet by mouth Every Night., Disp: , Rfl:   •  pravastatin (PRAVACHOL) 40 MG tablet, Take 1 tablet by mouth Daily., Disp: , Rfl:   •  Probiotic Product (PROBIOTIC DAILY PO), Daily., Disp: , Rfl:   •  sucralfate (CARAFATE) 1 g tablet, Take 1 tablet by mouth 4 (Four) Times a Day., Disp: , Rfl:   •  temazepam (RESTORIL) 30 MG capsule, Take 1 capsule by mouth Every Night., Disp: , Rfl:   •  vitamin B-12 (CYANOCOBALAMIN) 1000 MCG tablet, Take 1 tablet by mouth Daily., Disp: , Rfl:   •  vitamin D (ERGOCALCIFEROL) 45772 UNITS capsule capsule, Take 1 capsule by mouth 1 (One) Time Per Week., Disp: , Rfl:     Physical Exam:  Vitals:    03/20/23 1326   BP: 140/72   BP Location: Left arm   Patient Position: Sitting   Cuff Size: Adult   Pulse: 64   Temp: 98 °F (36.7 °C)   SpO2: 98%   Weight: 65.6 kg (144 lb 9.6 oz)   Height: 162.6 cm (64\")   PainSc: 0-No pain     Current Pain Level: none  Pulse Ox: Normal  on room air  General: alert, appears stated age and cooperative     Body Habitus: well-nourished    HEENT: Head: Normocephalic, no lesions, without obvious abnormality. No arcus senilis, xanthelasma or xanthomas.    Neuro: alert, oriented x3  Pulses: 2+ and symmetric  JVP: Volume/Pulsation: Normal.  Normal waveforms.   Appropriate inspiratory decrease.  No Kussmaul's. No Anish's.   Carotid Exam: no bruit normal pulsation bilaterally   Carotid Volume: normal.     Respirations: no increased work of breathing   Chest:  Normal    Pulmonary:Normal   Precordium: Normal impulses. P2 is not palpable.  RV Heave: absent  LV Heave: absent  Wyatt:  normal size and placement  Palpable S4: absent.  Heart rate: normal    Heart Rhythm: regular     Heart Sounds: S1: normal  S2: normal  S3: absent   S4: absent  Opening Snap: absent    Pericardial Rub:  Absent: .    Abdomen:   Appearance: normal .  Palpation: Soft, non-tender to palpation, bowel sounds positive in all four quadrants; " no guarding or rebound tenderness  Extremity: no edema.   LE Skin: no rashes  LE Hair:  normal  LE Pulses: well perfused with normal pulses in the distal extremities  Pallor on elevation: Absent. Rubor on dependency: None      DATA REVIEWED:     EKG. I personally reviewed and interpreted the EKG.  NSR     ECG/EMG Results (all)     Procedure Component Value Units Date/Time    ECG 12 Lead [[id number]] Collected: 03/20/23 1340     Updated: 03/20/23 1341     QT Interval 432 ms      QTC Interval 445 ms     Narrative:      Test Reason : essential hypertension  Blood Pressure :   */*   mmHG  Vent. Rate :  64 BPM     Atrial Rate :  64 BPM     P-R Int : 162 ms          QRS Dur :  80 ms      QT Int : 432 ms       P-R-T Axes :  75 -54  51 degrees     QTc Int : 445 ms    Normal sinus rhythm  Left anterior fascicular block  Abnormal ECG  When compared with ECG of 04-MAR-2022 12:00,  QRS axis Shifted left    Referred By: BRANDON           Confirmed By:         ---------------------------------------------------  TTE/DAHIANA:  Results for orders placed in visit on 11/17/21    Adult Transthoracic Echo Complete W/ Cont if Necessary Per Protocol    Interpretation Summary  · Left ventricular wall thickness is consistent with mild concentric hypertrophy.  · Estimated left ventricular EF = 61% Left ventricular ejection fraction appears to be 61 - 65%. Left ventricular systolic function is normal.  · Left ventricular diastolic function is consistent with (grade Ia w/high LAP) impaired relaxation.  · Moderate mitral valve regurgitation is present.  · Moderate tricuspid valve regurgitation is present.  · Estimated right ventricular systolic pressure from tricuspid regurgitation is mildly elevated (35-45 mmHg).  · Mild pulmonary hypertension is present.    --------------------------------------------------------------------------------------------------  LABS:     The 10-year CVD risk score (Mike et al., 2008) is: 12.4%    Values used to  calculate the score:      Age: 72 years      Sex: Female      Diabetic: No      Tobacco smoker: No      Systolic Blood Pressure: 140 mmHg      Is BP treated: Yes      HDL Cholesterol: 55 mg/dL      Total Cholesterol: 144 mg/dL         Lab Results   Component Value Date    GLUCOSE 70 06/15/2021    BUN 8 06/15/2021    CREATININE 0.97 06/15/2021    EGFRIFNONA 56 (L) 06/15/2021    BCR 8.2 06/15/2021    K 4.3 06/15/2021    CO2 27.0 06/15/2021    CALCIUM 9.6 06/15/2021    ALBUMIN 4.40 06/15/2021    AST 20 06/15/2021    ALT 8 06/15/2021     Lab Results   Component Value Date    WBC 7.35 01/12/2023    HGB 12.5 01/12/2023    HCT 36.4 01/12/2023    MCV 81.4 01/12/2023     01/12/2023     Lab Results   Component Value Date    CHOL 144 06/15/2021    TRIG 104 06/15/2021    HDL 55 06/15/2021    LDL 70 06/15/2021     Lab Results   Component Value Date    TSH 2.63 03/03/2014     Lab Results   Component Value Date    CKTOTAL 54 06/26/2016    CKMB 1.1 06/26/2016    TROPONINI <0.012 06/26/2016     No results found for: HGBA1C  No results found for: DDIMER  Lab Results   Component Value Date    ALT 8 06/15/2021     No results found for: HGBA1C  Lab Results   Component Value Date    CREATININE 0.97 06/15/2021     Lab Results   Component Value Date    IRON 62 01/12/2023    TIBC 374 01/12/2023    FERRITIN 34.19 01/12/2023     Lab Results   Component Value Date    INR 0.98 03/24/2017    INR 1.0 06/26/2016    INR 2.4 07/17/2015    PROTIME 12.9 03/24/2017    PROTIME 13.6 06/26/2016    PROTIME 25.8 (H) 07/17/2015      Diagnosis Plan   1. Essential hypertension  ECG 12 Lead      2. Mixed hyperlipidemia        3. Coronary artery disease involving native coronary artery of native heart without angina pectoris        4. Dyspnea, unspecified type        5. Lightheaded            ASSESSMENT & PLAN  1. LH start workup with monitor to r/o AF consider Echo and stress test due to abd discomfort and symptoms   2 HTN controlled   3 dyspnea on  exertion ongoing issue at this point we will begin with noninvasive testing due to worsening symptomatology with the above  Thank you for allowing me to participate in this patient's care    This document has been electronically signed by Ez Benz DO on March 20, 2023 13:47 CDT

## 2023-03-21 LAB
QT INTERVAL: 432 MS
QTC INTERVAL: 445 MS

## 2023-03-29 ENCOUNTER — HOSPITAL ENCOUNTER (OUTPATIENT)
Dept: NUCLEAR MEDICINE | Facility: HOSPITAL | Age: 73
Discharge: HOME | End: 2023-03-29
Payer: MEDICARE

## 2023-03-29 ENCOUNTER — HOSPITAL ENCOUNTER (OUTPATIENT)
Dept: CARDIOLOGY | Facility: HOSPITAL | Age: 73
Discharge: HOME | End: 2023-03-29
Payer: MEDICARE

## 2023-03-29 DIAGNOSIS — R06.09 DOE (DYSPNEA ON EXERTION): ICD-10-CM

## 2023-03-29 DIAGNOSIS — R42 LIGHTHEADED: ICD-10-CM

## 2023-03-29 LAB
BH CV REST NUCLEAR ISOTOPE DOSE: 10.7 MCI
BH CV STRESS BP STAGE 1: NORMAL
BH CV STRESS COMMENTS STAGE 1: NORMAL
BH CV STRESS DOSE REGADENOSON STAGE 1: 0.4
BH CV STRESS DURATION MIN STAGE 1: 0
BH CV STRESS DURATION SEC STAGE 1: 10
BH CV STRESS HR STAGE 1: 69
BH CV STRESS NUCLEAR ISOTOPE DOSE: 35 MCI
BH CV STRESS PROTOCOL 1: NORMAL
BH CV STRESS RECOVERY BP: NORMAL MMHG
BH CV STRESS RECOVERY HR: 71 BPM
BH CV STRESS STAGE 1: 1
LV EF NUC BP: 65 %
MAXIMAL PREDICTED HEART RATE: 146 BPM
PERCENT MAX PREDICTED HR: 54.11 %
STRESS BASELINE BP: NORMAL MMHG
STRESS BASELINE HR: 59 BPM
STRESS PERCENT HR: 64 %
STRESS POST ESTIMATED WORKLOAD: 1 METS
STRESS POST PEAK BP: NORMAL MMHG
STRESS POST PEAK HR: 79 BPM
STRESS TARGET HR: 124 BPM

## 2023-03-29 PROCEDURE — A9500 TC99M SESTAMIBI: HCPCS | Performed by: INTERNAL MEDICINE

## 2023-03-29 PROCEDURE — 93017 CV STRESS TEST TRACING ONLY: CPT

## 2023-03-29 PROCEDURE — 25010000002 REGADENOSON 0.4 MG/5ML SOLUTION: Performed by: INTERNAL MEDICINE

## 2023-03-29 PROCEDURE — 0 TECHNETIUM SESTAMIBI: Performed by: INTERNAL MEDICINE

## 2023-03-29 PROCEDURE — 78452 HT MUSCLE IMAGE SPECT MULT: CPT

## 2023-03-29 RX ORDER — SODIUM CHLORIDE 0.9 % (FLUSH) 0.9 %
10 SYRINGE (ML) INJECTION ONCE
Status: COMPLETED | OUTPATIENT
Start: 2023-03-29 | End: 2023-03-29

## 2023-03-29 RX ADMIN — REGADENOSON 0.4 MG: 0.08 INJECTION, SOLUTION INTRAVENOUS at 08:56

## 2023-03-29 RX ADMIN — TECHNETIUM TC 99M SESTAMIBI 1 DOSE: 1 INJECTION INTRAVENOUS at 07:54

## 2023-03-29 RX ADMIN — Medication 10 ML: at 08:56

## 2023-03-29 RX ADMIN — TECHNETIUM TC 99M SESTAMIBI 1 DOSE: 1 INJECTION INTRAVENOUS at 08:57

## 2023-03-30 ENCOUNTER — DOCUMENTATION (OUTPATIENT)
Dept: CARDIOLOGY | Facility: CLINIC | Age: 73
End: 2023-03-30
Payer: MEDICARE

## 2023-03-30 ENCOUNTER — TELEPHONE (OUTPATIENT)
Dept: CARDIOLOGY | Facility: CLINIC | Age: 73
End: 2023-03-30
Payer: MEDICARE

## 2023-03-30 ENCOUNTER — PREP FOR SURGERY (OUTPATIENT)
Dept: OTHER | Facility: HOSPITAL | Age: 73
End: 2023-03-30
Payer: MEDICARE

## 2023-03-30 DIAGNOSIS — R94.39 ABNORMAL NUCLEAR STRESS TEST: Primary | ICD-10-CM

## 2023-03-30 RX ORDER — SODIUM CHLORIDE 0.9 % (FLUSH) 0.9 %
10 SYRINGE (ML) INJECTION AS NEEDED
OUTPATIENT
Start: 2023-03-30

## 2023-03-30 RX ORDER — ASPIRIN 81 MG/1
81 TABLET, CHEWABLE ORAL ONCE
OUTPATIENT
Start: 2023-03-30 | End: 2023-03-30

## 2023-03-30 RX ORDER — SODIUM CHLORIDE 9 MG/ML
40 INJECTION, SOLUTION INTRAVENOUS AS NEEDED
OUTPATIENT
Start: 2023-03-30

## 2023-03-30 RX ORDER — SODIUM CHLORIDE 0.9 % (FLUSH) 0.9 %
3 SYRINGE (ML) INJECTION EVERY 12 HOURS SCHEDULED
OUTPATIENT
Start: 2023-03-30

## 2023-03-30 RX ORDER — SODIUM CHLORIDE 9 MG/ML
50 INJECTION, SOLUTION INTRAVENOUS CONTINUOUS
OUTPATIENT
Start: 2023-03-30

## 2023-03-30 RX ORDER — ASPIRIN 81 MG/1
81 TABLET ORAL DAILY
OUTPATIENT
Start: 2023-03-31

## 2023-03-30 NOTE — PROGRESS NOTES
I called to speak with Ms. Maldonado regarding her myocardial nuclear stress testing results.  She has an area of ischemia where her heart is not getting enough blood supply.  She tells me that she is symptomatic daily and having worsening dyspnea on exertion and fatigue.  She goes down a wheelchair ramp and up and has to sit down due to worsening LUX.  She can no longer do her activities around the house without several rest episodes.    Discussed next step as left heart catheterization.  She has had a heart catheterization before at [place].  Risk and benefits discussed.  We discussed that Dr. Garner would be cathing physician and she is agreeable to proceed.    I instructed her that Dr. Patsy Welch's nurse would be giving her call to set up date and time and go over instructions.  She verbalized understanding.          This document has been electronically signed by LILA Marin on March 30, 2023 12:42 CDT

## 2023-03-31 ENCOUNTER — TELEPHONE (OUTPATIENT)
Dept: CARDIOLOGY | Facility: CLINIC | Age: 73
End: 2023-03-31
Payer: MEDICARE

## 2023-03-31 PROBLEM — R94.39 ABNORMAL NUCLEAR STRESS TEST: Status: ACTIVE | Noted: 2023-03-31

## 2023-03-31 NOTE — TELEPHONE ENCOUNTER
Patient contacted with date and time of [facility]. She has been scheduled for April 11. All instructions discussed with the patient.    KARRIE (acute kidney injury)

## 2023-04-10 ENCOUNTER — TELEPHONE (OUTPATIENT)
Dept: CARDIOLOGY | Facility: CLINIC | Age: 73
End: 2023-04-10
Payer: MEDICARE

## 2023-04-10 PROCEDURE — S0260 H&P FOR SURGERY: HCPCS | Performed by: INTERNAL MEDICINE

## 2023-04-10 NOTE — TELEPHONE ENCOUNTER
Attempted to contact patient regarding a message that was left. Voicemail left asking her to call the office.

## 2023-04-10 NOTE — TELEPHONE ENCOUNTER
[facility] Rep  Rebekah Arcin[place], [place]  Phone Number:  [phone] (Call me)     Alanna Maldonado  [id number] wants a call back @ [phone] to talk about procedure [facility] is doing tomorrow. Thx    Patient contacted regarding instructions for heart cath. All questions answered.

## 2023-04-10 NOTE — H&P
[facility]iology  HISTORY AND PHYSICAL  Fouzia Zuleika Maldonado  72 y.o. female    Chief complaint -  Angina: Shortness of breath.    History of Present Illness:    This is a 72-year-old female with history of hypertension, hyperlipidemia, moderate mitral regurgitation, known nonobstructive coronary artery disease had previously been seen by Dr. Matthews and recently saw Dr. Benz -for worsening shortness of breath affecting her quality of life.  She has a known history of coronary artery disease with a calcium score of 700 with known moderate disease in the RCA and mild disease in the LAD and left circumflex and left main.  She had been treated conservatively previously however with worsening shortness of breath concerning for potential angina Patient Underwent Nuclear Stress Test Which Was concerning for Apical Ischemia.  Continues to Have Significant Symptoms Affecting Her Quality of Life.  Has Been on Appropriate Medical Therapy with Aspirin/Amlodipine/Imdur/Metoprolol.  Patient Was Recommended Cardiac Cath for Further Evaluation.        Allergies   Allergen Reactions   • Amoxicillin Rash   • Darvon [Propoxyphene] Rash         Past Medical History:   Diagnosis Date   • Anemia    • Arthritis    • Coronary atherosclerosis of native coronary artery    • Depression    • GERD (gastroesophageal reflux disease)    • Hearing loss of both ears     can hear nothing out of left ear and has right ear loss as well   • Hyperlipidemia    • Hypertension    • Meniere disease    • Nonrheumatic mitral valve disorder    • Pancreatitis    • PONV (postoperative nausea and vomiting)    • Precordial pain    • Preprocedural cardiovascular examination    • Stomach ulcer    • Suicide attempt by drug ingestion 1998   • Transfusion history          Past Surgical History:   Procedure Laterality Date   • CARDIAC CATHETERIZATION     • CHOLECYSTECTOMY WITH INTRAOPERATIVE CHOLANGIOGRAM N/A 3/30/2021    Procedure: LAPAROSCOPIC   CHOLECYSTECTOMY WITH INTRAOPERATIVE CHOLANGIOGRAM;  Surgeon: John Stratton MD;  Location: [facility] OR;  Service: General;  Laterality: N/A;   • COLONOSCOPY     • ECHO - CONVERTED  2014    Normal LV systolic function with EF of 60%. Early diastolic dysfunction. Left atrium moderately dilated. Normal RV size and function. Trace mitral and mild tricuspid regurg. No intracardiac mass, pericardial effusion or cardiac thrombus seen   • ENDOSCOPY N/A 3/27/2017    Procedure: ESOPHAGOGASTRODUODENOSCOPY WITH CONTROL OF BLEED;  Surgeon: Eladio Esquivel DO;  Location: [facility] ENDOSCOPY;  Service:    • ENDOSCOPY N/A 2018    Procedure: ESOPHAGOGASTRODUODENOSCOPY;  Surgeon: Eladio Esquivel DO;  Location: [facility] ENDOSCOPY;  Service: Gastroenterology   • ENDOSCOPY N/A 3/18/2021    Procedure: ESOPHAGOGASTRODUODENOSCOPY;  Surgeon: Eladio Esquivel DO;  Location: [facility] ENDOSCOPY;  Service: Gastroenterology;  Laterality: N/A;   • HYSTERECTOMY     • JOINT REPLACEMENT     • TOTAL KNEE ARTHROPLASTY Bilateral    • TUBAL ABDOMINAL LIGATION     • TUMOR REMOVAL Left     fatty tumor removed from left breast   • TUMOR REMOVAL Right     fatty tumor removal from the right shoulder         Family History   Problem Relation Age of Onset   • Heart disease Mother    • Cancer Mother         colon   • Early death Mother 68        colon cancer   • Hypertension Mother    • Hyperlipidemia Mother    • Heart disease Father    • Alcohol abuse Father    • Early death Father 56        heart attack   • Hypertension Father    • Hyperlipidemia Father    • Cancer Maternal Grandmother         stomach   • Cancer Maternal Grandfather    • Cancer Paternal Grandfather         stomach         Social History     Socioeconomic History   • Marital status:    Tobacco Use   • Smoking status: Former     Types: Cigarettes     Quit date:      Years since quittin.2   • Smokeless tobacco: Never   Vaping Use   • Vaping Use: Never used    Substance and Sexual Activity   • Alcohol use: No   • Drug use: No   • Sexual activity: Defer         Prior to Admission medications    Medication Sig Start Date End Date Taking? Authorizing Provider   amLODIPine (NORVASC) 10 MG tablet Take 0.5 tablets by mouth Daily. 12/27/22   Robbin Matthews MD   ascorbic acid (VITAMIN C) 1000 MG tablet Take 1 capsule by mouth Daily. 2/24/21   Roxanne Stern MD   aspirin 81 MG chewable tablet Chew 1 tablet Daily.    Roxanne Stern MD   carvedilol (COREG) 3.125 MG tablet Take 1 tablet by mouth 2 (Two) Times a Day. 1/16/23   Robbin Matthews MD   Coenzyme Q10 200 MG capsule Take 1 tablet by mouth Daily. 2/24/21   Roxanne Stern MD   enalapril (VASOTEC) 10 MG tablet Take 0.5 tablets by mouth Daily As Needed (for SBP >160). 3/4/22   Robbin Matthews MD   FLUoxetine (PROzac) 20 MG capsule Take 1 capsule by mouth Daily.    Roxanne Stern MD   fluticasone (FLONASE) 50 MCG/ACT nasal spray  8/17/21   Roxanne Stern MD   gabapentin (NEURONTIN) 100 MG capsule Take 2 capsules by mouth 3 (Three) Times a Day. 3/16/23   Roxanne Stern MD   gemfibrozil (Lopid) 600 MG tablet Take 1 tablet by mouth 2 (Two) Times a Day Before Meals.    Roxanne Stern MD   hydroCHLOROthiazide (MICROZIDE) 12.5 MG capsule Take 1 capsule by mouth Daily. 9/13/22   Robbin Matthews MD   isosorbide mononitrate (IMDUR) 30 MG 24 hr tablet Take 1 tablet by mouth Daily. 9/13/22   Robbin Matthews MD   Magnesium Hydroxide (MAGNESIA PO) Take 250 mg by mouth Daily.    Roxanne Stern MD   meclizine (ANTIVERT) 25 MG tablet  9/29/21   Roxanne Stern MD   meloxicam (MOBIC) 15 MG tablet Take 1 tablet by mouth Daily.    Roxanne Stern MD   metoprolol tartrate (LOPRESSOR) 25 MG tablet Take one tablet the night before CTA.   Take one tablet the morning of CTA. 6/15/21   Robbin Matthews MD   Omega-3 Fatty Acids (fish oil) 1000 MG capsule capsule Take 1 capsule by  mouth Daily. 2/24/21   Roxanne Stern MD   omeprazole (priLOSEC) 40 MG capsule As Needed. 3/30/21   Roxanne Stern MD   ondansetron (ZOFRAN) 4 MG tablet Take 1 tablet by mouth As Needed. 3/23/21   Roxanne Stern MD   pantoprazole (PROTONIX) 40 MG EC tablet Take 1 tablet by mouth 2 (Two) Times a Day.    Roxanne Stern MD   Potassium 99 MG tablet Take 1 tablet by mouth Every Night.    Roxanne Stern MD   pravastatin (PRAVACHOL) 40 MG tablet Take 1 tablet by mouth Daily.    Roxanne Stern MD   Probiotic Product (PROBIOTIC DAILY PO) Daily. 3/23/21   Roxanne Stern MD   sucralfate (CARAFATE) 1 g tablet Take 1 tablet by mouth 4 (Four) Times a Day.    Roxanne Stern MD   temazepam (RESTORIL) 30 MG capsule Take 1 capsule by mouth Every Night. 2/17/21   Roxanne Stern MD   vitamin B-12 (CYANOCOBALAMIN) 1000 MCG tablet Take 1 tablet by mouth Daily.    Roxanne Stern MD   vitamin D (ERGOCALCIFEROL) 64852 UNITS capsule capsule Take 1 capsule by mouth 1 (One) Time Per Week.    Roxanne Stern MD         Review of Systems:     Constitution: Denies any fatigue, fever or chills.  HENT: Denies any headache, hearing impairment.  Eyes: Denies any blurring of vision, or photophobia.  Cardiovascular:  As per history of present illness.   Respiratory system: Denies any COPD, shortness of breath.  Endocrine:  No history of hyperlipidemia, diabetes.  Musculoskeletal:  No history of arthritis with musculoskeletal problems.  Gastrointestinal: No nausea, vomiting, or melena.  Genitourinary: No dysuria or hematuria.  Neurological: No history of seizure disorder, stroke, or memory problems.    Psychiatric/Behavioral: No history of depression, bipolar disorder or schizophrenia .    Hematological: No history of easy bruising.    ROS          OBJECTIVE:    /56 (BP Location: Left arm, Patient Position: Lying)   Pulse 62   Temp 97.6 °F (36.4 °C) (Temporal)   Resp 18  "  Ht 163.8 cm (64.5\")   Wt 65.3 kg (143 lb 15.4 oz)   SpO2 99%   BMI 24.33 kg/m²       Physical Exam:   Constitutional: Patient is oriented to person, place, and time.   Skin: Warm and dry.  Well developed and nourished in no acute distress .  Head: Normocephalic and atraumatic.   Eyes: Pupils are equal, round, and reactive to light.   Neck: Neck supple. No bruit in the carotids, no elevation of JVD.  Cardiovascular: [place] in the fifth intercostal space, regular rate, and rhythm,  S1 greater than S2, no S3 or S4, no gallop.  Pulmonary/Chest: Air entry is equal on both sides.  No wheezing or crackles.  Abdominal: Soft.  No hepatosplenomegaly, bowel sounds are present.  Musculoskeletal: No kyphoscoliosis.  Neurological: Alert and oriented to person, place, and time.  Cranial nerves are intact. No motor or sensory deficit.  Extremities: No edema, no radial femoral delay.  Psychiatric: Normal mood and affect. Behavior is normal.      Lab Results (last 24 hours)     Procedure Component Value Units Date/Time    CBC (No Diff) [[id number]] Collected: 04/11/23 0730    Specimen: Blood Updated: 04/11/23 0736    Basic Metabolic Panel [[id number]]  (Abnormal) Collected: 04/11/23 0730    Specimen: Blood Updated: 04/11/23 0754     Glucose 104 mg/dL      BUN 12 mg/dL      Creatinine 1.00 mg/dL      Sodium 128 mmol/L      Potassium 3.4 mmol/L      Chloride 93 mmol/L      CO2 25.0 mmol/L      Calcium 9.2 mg/dL      BUN/Creatinine Ratio 12.0     Anion Gap 10.0 mmol/L      eGFR 59.2 mL/min/1.73     Narrative:      GFR Normal >60  Chronic Kidney Disease <60  Kidney Failure <15    The GFR formula is only valid for adults with stable renal function between ages 18 and 70.    Protime-INR [[id number]]  (Normal) Collected: 04/11/23 0730    Specimen: Blood Updated: 04/11/23 0803     Protime 13.4 Seconds      INR 1.03    Narrative:      Therapeutic range for most indications is 2.0-3.0 INR,  or 2.5-3.5 for mechanical heart valves.    "       Results for orders placed in visit on 11/17/21    Adult Transthoracic Echo Complete W/ Cont if Necessary Per Protocol    Interpretation Summary  · Left ventricular wall thickness is consistent with mild concentric hypertrophy.  · Estimated left ventricular EF = 61% Left ventricular ejection fraction appears to be 61 - 65%. Left ventricular systolic function is normal.  · Left ventricular diastolic function is consistent with (grade Ia w/high LAP) impaired relaxation.  · Moderate mitral valve regurgitation is present.  · Moderate tricuspid valve regurgitation is present.  · Estimated right ventricular systolic pressure from tricuspid regurgitation is mildly elevated (35-45 mmHg).  · Mild pulmonary hypertension is present.      The 10-year ASCVD risk score (Emil AQUINO, et al., 2019) is: 12.7%    Values used to calculate the score:      Age: 72 years      Sex: Female      Is Non- : No      Diabetic: No      Tobacco smoker: No      Systolic Blood Pressure: 105 mmHg      Is BP treated: Yes      HDL Cholesterol: 55 mg/dL      Total Cholesterol: 144 mg/dL          A/P:    [facility] Pre-Op:    Invasive coronary angiography was recommended to the patient.  The patientdenies  bleeding issues. The patient reports use of antiplatelet agents.  The patient denies  CKD. The patient denies  contrast allergy. The patient denies  use of diabetes medications.    Pre-Cath Surgical History: NA    The indications, risks/benefits and alternatives of diagnostic left heart cardiac catheterization, angiography, conscious sedation, and possible blood transfusion were discussed in detail with the patient. The potential complications of 1/2000 chance of death, 1/1000 chance of heart attack or stroke, 1/500 chance of bleeding or clotting of the femoral artery, and 1/500 chance of allergic reaction to contrast were discussed. We also reviewed possible complications of infection and kidney dysfunction. If PCI were performed  and intra-coronary stents indicated, we discussed the details about AVRIL. This included a review of the risks of the infrequent, but relatively higher incidence of late thrombosis with AVRIL. The importance of maintaining a consistent daily regimen of aspirin and an additional anti-platelet agent for as long as directed after implantation was emphasized. No contraindications were found. The patient  appeared to understand and agreed to the above.  -Left heart catheterization, Coronary angiography, Graft angiography, In-situ LIMA angiography, Left ventriculography, Intravascular ultrasound, Optical coherence tomography, Flow wire, Balloon Angioplasty, Coronary stent, Graft stent, Aortography, Iliofemoral angiography, Device closure, femoral artery, Intra-aortic balloon pump, Impella LV assist device implantation, Transvenous pacemaker, Pericardiocentesis and Subclavian angiography      ASA Class: III  Mallampati Score: II      Contraindications to DAPT: None        BMI is within normal parameters. No other follow-up for BMI required.      Fouzia Maldonado  reports that she quit smoking about 17 years ago. Her smoking use included cigarettes. She has never used smokeless tobacco..    Ene Garner MD  4/11/2023  08:21 CDT      Part of this note may be an electronic transcription/translation of spoken language to printed text using the Dragon Dictation System.

## 2023-04-11 ENCOUNTER — HOSPITAL ENCOUNTER (OUTPATIENT)
Facility: HOSPITAL | Age: 73
Discharge: HOME | End: 2023-04-12
Attending: INTERNAL MEDICINE | Admitting: NURSE PRACTITIONER
Payer: MEDICARE

## 2023-04-11 DIAGNOSIS — R94.39 ABNORMAL NUCLEAR STRESS TEST: ICD-10-CM

## 2023-04-11 LAB
ANION GAP SERPL CALCULATED.3IONS-SCNC: 10 MMOL/L (ref 5–15)
BUN SERPL-MCNC: 12 MG/DL (ref 8–23)
BUN/CREAT SERPL: 12 (ref 7–25)
CALCIUM SPEC-SCNC: 9.2 MG/DL (ref 8.6–10.5)
CHLORIDE SERPL-SCNC: 93 MMOL/L (ref 98–107)
CO2 SERPL-SCNC: 25 MMOL/L (ref 22–29)
CREAT SERPL-MCNC: 1 MG/DL (ref 0.57–1)
EGFRCR SERPLBLD CKD-EPI 2021: 59.2 ML/MIN/1.73
GLUCOSE SERPL-MCNC: 104 MG/DL (ref 65–99)
HCT VFR BLD AUTO: NORMAL %
HGB BLD-MCNC: NORMAL G/DL
INR PPP: 1.03 (ref 0.8–1.2)
MCH RBC QN AUTO: NORMAL PG
MCHC RBC AUTO-ENTMCNC: NORMAL G/DL
MCV RBC AUTO: NORMAL FL
PLATELET # BLD AUTO: NORMAL 10*3/UL
POTASSIUM SERPL-SCNC: 3.4 MMOL/L (ref 3.5–5.2)
PROTHROMBIN TIME: 13.4 SECONDS (ref 11.1–15.3)
RBC # BLD AUTO: NORMAL 10*6/UL
SODIUM SERPL-SCNC: 128 MMOL/L (ref 136–145)
WBC NRBC COR # BLD: NORMAL 10*3/UL

## 2023-04-11 PROCEDURE — A9270 NON-COVERED ITEM OR SERVICE: HCPCS | Performed by: INTERNAL MEDICINE

## 2023-04-11 PROCEDURE — 80048 BASIC METABOLIC PNL TOTAL CA: CPT | Performed by: NURSE PRACTITIONER

## 2023-04-11 PROCEDURE — 85027 COMPLETE CBC AUTOMATED: CPT | Performed by: NURSE PRACTITIONER

## 2023-04-11 PROCEDURE — 63710000001 PRAVASTATIN 40 MG TABLET: Performed by: INTERNAL MEDICINE

## 2023-04-11 PROCEDURE — C9600 PERC DRUG-EL COR STENT SING: HCPCS | Performed by: INTERNAL MEDICINE

## 2023-04-11 PROCEDURE — C1753 CATH, INTRAVAS ULTRASOUND: HCPCS | Performed by: INTERNAL MEDICINE

## 2023-04-11 PROCEDURE — C1725 CATH, TRANSLUMIN NON-LASER: HCPCS | Performed by: INTERNAL MEDICINE

## 2023-04-11 PROCEDURE — 63710000001 FLUOXETINE 20 MG CAPSULE: Performed by: INTERNAL MEDICINE

## 2023-04-11 PROCEDURE — 25010000002 MIDAZOLAM PER 1 MG: Performed by: INTERNAL MEDICINE

## 2023-04-11 PROCEDURE — 92928 PRQ TCAT PLMT NTRAC ST 1 LES: CPT | Performed by: INTERNAL MEDICINE

## 2023-04-11 PROCEDURE — C1769 GUIDE WIRE: HCPCS | Performed by: INTERNAL MEDICINE

## 2023-04-11 PROCEDURE — C1874 STENT, COATED/COV W/DEL SYS: HCPCS | Performed by: INTERNAL MEDICINE

## 2023-04-11 PROCEDURE — 92978 ENDOLUMINL IVUS OCT C 1ST: CPT | Performed by: INTERNAL MEDICINE

## 2023-04-11 PROCEDURE — C1894 INTRO/SHEATH, NON-LASER: HCPCS | Performed by: INTERNAL MEDICINE

## 2023-04-11 PROCEDURE — 63710000001 TEMAZEPAM 15 MG CAPSULE: Performed by: INTERNAL MEDICINE

## 2023-04-11 PROCEDURE — 63710000001 CARVEDILOL 3.125 MG TABLET: Performed by: INTERNAL MEDICINE

## 2023-04-11 PROCEDURE — 93458 L HRT ARTERY/VENTRICLE ANGIO: CPT | Performed by: INTERNAL MEDICINE

## 2023-04-11 PROCEDURE — 25010000002 HEPARIN (PORCINE) PER 1000 UNITS: Performed by: INTERNAL MEDICINE

## 2023-04-11 PROCEDURE — 25010000002 FENTANYL CITRATE (PF) 50 MCG/ML SOLUTION: Performed by: INTERNAL MEDICINE

## 2023-04-11 PROCEDURE — 25510000001 IOPAMIDOL PER 1 ML: Performed by: INTERNAL MEDICINE

## 2023-04-11 PROCEDURE — 85610 PROTHROMBIN TIME: CPT | Performed by: NURSE PRACTITIONER

## 2023-04-11 PROCEDURE — 63710000001 SUCRALFATE 1 G TABLET: Performed by: INTERNAL MEDICINE

## 2023-04-11 PROCEDURE — 63710000001 POTASSIUM CHLORIDE 20 MEQ PACK: Performed by: INTERNAL MEDICINE

## 2023-04-11 PROCEDURE — 63710000001 PANTOPRAZOLE 40 MG TABLET DELAYED-RELEASE: Performed by: INTERNAL MEDICINE

## 2023-04-11 PROCEDURE — C1887 CATHETER, GUIDING: HCPCS | Performed by: INTERNAL MEDICINE

## 2023-04-11 PROCEDURE — 63710000001 GABAPENTIN 100 MG CAPSULE: Performed by: INTERNAL MEDICINE

## 2023-04-11 DEVICE — XIENCE SKYPOINT™ EVEROLIMUS ELUTING CORONARY STENT SYSTEM 4.00 MM X 38 MM / RAPID-EXCHANGE
Type: IMPLANTABLE DEVICE | Site: CORONARY | Status: FUNCTIONAL
Brand: XIENCE SKYPOINT™

## 2023-04-11 DEVICE — XIENCE SKYPOINT™ EVEROLIMUS ELUTING CORONARY STENT SYSTEM 4.00 MM X 23 MM / RAPID-EXCHANGE
Type: IMPLANTABLE DEVICE | Site: CORONARY | Status: FUNCTIONAL
Brand: XIENCE SKYPOINT™

## 2023-04-11 RX ORDER — FLUTICASONE PROPIONATE 50 MCG
1 SPRAY, SUSPENSION (ML) NASAL DAILY
Status: DISCONTINUED | OUTPATIENT
Start: 2023-04-11 | End: 2023-04-12 | Stop reason: HOSPADM

## 2023-04-11 RX ORDER — POTASSIUM CHLORIDE 1.5 G/1.77G
20 POWDER, FOR SOLUTION ORAL DAILY
Status: DISCONTINUED | OUTPATIENT
Start: 2023-04-11 | End: 2023-04-12 | Stop reason: HOSPADM

## 2023-04-11 RX ORDER — HEPARIN SODIUM 1000 [USP'U]/ML
INJECTION, SOLUTION INTRAVENOUS; SUBCUTANEOUS
Status: DISCONTINUED | OUTPATIENT
Start: 2023-04-11 | End: 2023-04-11 | Stop reason: HOSPADM

## 2023-04-11 RX ORDER — ONDANSETRON 4 MG/1
4 TABLET, FILM COATED ORAL EVERY 6 HOURS PRN
Status: DISCONTINUED | OUTPATIENT
Start: 2023-04-11 | End: 2023-04-12 | Stop reason: HOSPADM

## 2023-04-11 RX ORDER — AMLODIPINE BESYLATE 5 MG/1
5 TABLET ORAL DAILY
Status: DISCONTINUED | OUTPATIENT
Start: 2023-04-12 | End: 2023-04-12 | Stop reason: HOSPADM

## 2023-04-11 RX ORDER — CARVEDILOL 3.12 MG/1
3.12 TABLET ORAL 2 TIMES DAILY WITH MEALS
Status: DISCONTINUED | OUTPATIENT
Start: 2023-04-11 | End: 2023-04-12 | Stop reason: HOSPADM

## 2023-04-11 RX ORDER — SODIUM CHLORIDE 0.9 % (FLUSH) 0.9 %
3 SYRINGE (ML) INJECTION EVERY 12 HOURS SCHEDULED
Status: DISCONTINUED | OUTPATIENT
Start: 2023-04-11 | End: 2023-04-11 | Stop reason: HOSPADM

## 2023-04-11 RX ORDER — NITROGLYCERIN 5 MG/ML
INJECTION, SOLUTION INTRAVENOUS
Status: DISCONTINUED | OUTPATIENT
Start: 2023-04-11 | End: 2023-04-11 | Stop reason: HOSPADM

## 2023-04-11 RX ORDER — ACETAMINOPHEN 325 MG/1
650 TABLET ORAL EVERY 4 HOURS PRN
Status: DISCONTINUED | OUTPATIENT
Start: 2023-04-11 | End: 2023-04-12 | Stop reason: HOSPADM

## 2023-04-11 RX ORDER — CLOPIDOGREL BISULFATE 75 MG/1
75 TABLET ORAL DAILY
Status: DISCONTINUED | OUTPATIENT
Start: 2023-04-12 | End: 2023-04-12 | Stop reason: HOSPADM

## 2023-04-11 RX ORDER — SUCRALFATE 1 G/1
1 TABLET ORAL
Status: DISCONTINUED | OUTPATIENT
Start: 2023-04-11 | End: 2023-04-12 | Stop reason: HOSPADM

## 2023-04-11 RX ORDER — ASPIRIN 81 MG/1
81 TABLET, CHEWABLE ORAL ONCE
Status: DISCONTINUED | OUTPATIENT
Start: 2023-04-11 | End: 2023-04-11

## 2023-04-11 RX ORDER — SODIUM CHLORIDE 9 MG/ML
50 INJECTION, SOLUTION INTRAVENOUS CONTINUOUS
Status: DISCONTINUED | OUTPATIENT
Start: 2023-04-11 | End: 2023-04-11

## 2023-04-11 RX ORDER — TEMAZEPAM 15 MG/1
30 CAPSULE ORAL NIGHTLY
Status: DISCONTINUED | OUTPATIENT
Start: 2023-04-11 | End: 2023-04-12 | Stop reason: HOSPADM

## 2023-04-11 RX ORDER — CLOPIDOGREL BISULFATE 75 MG/1
TABLET ORAL
Status: DISCONTINUED | OUTPATIENT
Start: 2023-04-11 | End: 2023-04-11 | Stop reason: HOSPADM

## 2023-04-11 RX ORDER — FENTANYL CITRATE 50 UG/ML
INJECTION, SOLUTION INTRAMUSCULAR; INTRAVENOUS
Status: DISCONTINUED | OUTPATIENT
Start: 2023-04-11 | End: 2023-04-11 | Stop reason: HOSPADM

## 2023-04-11 RX ORDER — LIDOCAINE HYDROCHLORIDE 20 MG/ML
INJECTION, SOLUTION INFILTRATION; PERINEURAL
Status: DISCONTINUED | OUTPATIENT
Start: 2023-04-11 | End: 2023-04-11 | Stop reason: HOSPADM

## 2023-04-11 RX ORDER — SODIUM CHLORIDE 0.9 % (FLUSH) 0.9 %
10 SYRINGE (ML) INJECTION AS NEEDED
Status: DISCONTINUED | OUTPATIENT
Start: 2023-04-11 | End: 2023-04-11 | Stop reason: HOSPADM

## 2023-04-11 RX ORDER — FLUOXETINE HYDROCHLORIDE 20 MG/1
20 CAPSULE ORAL DAILY
Status: DISCONTINUED | OUTPATIENT
Start: 2023-04-11 | End: 2023-04-12 | Stop reason: HOSPADM

## 2023-04-11 RX ORDER — GABAPENTIN 100 MG/1
200 CAPSULE ORAL 3 TIMES DAILY
Status: DISCONTINUED | OUTPATIENT
Start: 2023-04-11 | End: 2023-04-12 | Stop reason: HOSPADM

## 2023-04-11 RX ORDER — PRAVASTATIN SODIUM 40 MG
40 TABLET ORAL DAILY
Status: DISCONTINUED | OUTPATIENT
Start: 2023-04-11 | End: 2023-04-12 | Stop reason: HOSPADM

## 2023-04-11 RX ORDER — ASPIRIN 81 MG/1
81 TABLET, CHEWABLE ORAL DAILY
Status: DISCONTINUED | OUTPATIENT
Start: 2023-04-12 | End: 2023-04-12 | Stop reason: HOSPADM

## 2023-04-11 RX ORDER — SODIUM CHLORIDE 9 MG/ML
40 INJECTION, SOLUTION INTRAVENOUS AS NEEDED
Status: DISCONTINUED | OUTPATIENT
Start: 2023-04-11 | End: 2023-04-11 | Stop reason: HOSPADM

## 2023-04-11 RX ORDER — ENALAPRIL MALEATE 5 MG/1
5 TABLET ORAL DAILY PRN
Status: DISCONTINUED | OUTPATIENT
Start: 2023-04-12 | End: 2023-04-12 | Stop reason: HOSPADM

## 2023-04-11 RX ORDER — ASPIRIN 81 MG/1
81 TABLET ORAL DAILY
Status: DISCONTINUED | OUTPATIENT
Start: 2023-04-12 | End: 2023-04-11

## 2023-04-11 RX ORDER — MIDAZOLAM HYDROCHLORIDE 1 MG/ML
INJECTION INTRAMUSCULAR; INTRAVENOUS
Status: DISCONTINUED | OUTPATIENT
Start: 2023-04-11 | End: 2023-04-11 | Stop reason: HOSPADM

## 2023-04-11 RX ORDER — SODIUM CHLORIDE 9 MG/ML
125 INJECTION, SOLUTION INTRAVENOUS CONTINUOUS
Status: ACTIVE | OUTPATIENT
Start: 2023-04-11 | End: 2023-04-11

## 2023-04-11 RX ORDER — PANTOPRAZOLE SODIUM 40 MG/1
40 TABLET, DELAYED RELEASE ORAL
Status: DISCONTINUED | OUTPATIENT
Start: 2023-04-11 | End: 2023-04-12 | Stop reason: HOSPADM

## 2023-04-11 RX ADMIN — GABAPENTIN 200 MG: 100 CAPSULE ORAL at 19:58

## 2023-04-11 RX ADMIN — POTASSIUM CHLORIDE 20 MEQ: 1.5 POWDER, FOR SOLUTION ORAL at 14:23

## 2023-04-11 RX ADMIN — GABAPENTIN 200 MG: 100 CAPSULE ORAL at 12:38

## 2023-04-11 RX ADMIN — FLUOXETINE HYDROCHLORIDE 20 MG: 20 CAPSULE ORAL at 12:45

## 2023-04-11 RX ADMIN — GABAPENTIN 200 MG: 100 CAPSULE ORAL at 17:33

## 2023-04-11 RX ADMIN — TEMAZEPAM 30 MG: 15 CAPSULE ORAL at 19:58

## 2023-04-11 RX ADMIN — SUCRALFATE 1 G: 1 TABLET ORAL at 17:33

## 2023-04-11 RX ADMIN — CARVEDILOL 3.12 MG: 3.12 TABLET, FILM COATED ORAL at 17:33

## 2023-04-11 RX ADMIN — SUCRALFATE 1 G: 1 TABLET ORAL at 19:58

## 2023-04-11 RX ADMIN — SUCRALFATE 1 G: 1 TABLET ORAL at 12:39

## 2023-04-11 RX ADMIN — PANTOPRAZOLE SODIUM 40 MG: 40 TABLET, DELAYED RELEASE ORAL at 12:39

## 2023-04-11 RX ADMIN — SODIUM CHLORIDE 50 ML/HR: 9 INJECTION, SOLUTION INTRAVENOUS at 07:38

## 2023-04-11 RX ADMIN — CARVEDILOL 3.12 MG: 3.12 TABLET, FILM COATED ORAL at 12:39

## 2023-04-11 RX ADMIN — PRAVASTATIN SODIUM 40 MG: 40 TABLET ORAL at 12:39

## 2023-04-11 NOTE — Clinical Note
Second inflation of balloon - Max pressure = 16 terence. 2nd Inflation of balloon - Duration = 10 seconds.

## 2023-04-11 NOTE — Clinical Note
First balloon inflation max pressure = 10 terence. First balloon inflation duration = 15 seconds. Second inflation of balloon - Max pressure = 15 terence. 2nd Inflation of balloon - Duration = 11 seconds. Third inflation of balloon - Max pressure = 16 terence. 3rd Inflation of balloon - Duration = 8 seconds.

## 2023-04-11 NOTE — Clinical Note
First balloon inflation max pressure = 8 terence. First balloon inflation duration = 9 seconds. Second inflation of balloon - Max pressure = 12 terence. 2nd Inflation of balloon - Duration = 12 seconds.

## 2023-04-11 NOTE — Clinical Note
Hemostasis started on the right radial artery. R-Band was used in achieving hemostasis. Radial compression device applied to vessel. Hemostasis achieved successfully. Closure device additional comment: 14mL

## 2023-04-11 NOTE — Clinical Note
First balloon inflation max pressure = 8 terence. First balloon inflation duration = 11 seconds. Second inflation of balloon - Max pressure = 8 terence. 2nd Inflation of balloon - Duration = 6 seconds. Third inflation of balloon - Max pressure = 8 terence. 3rd Inflation of balloon - Duration = 5 seconds.

## 2023-04-11 NOTE — Clinical Note
First balloon inflation max pressure = 18 terence. First balloon inflation duration = 21 seconds. Second inflation of balloon - Max pressure = 18 terence. 2nd Inflation of balloon - Duration = 11 seconds. Third inflation of balloon - Max pressure = 20 terence. 3rd Inflation of balloon - Duration = 16 seconds. Fourth inflation of balloon - Max pressure = 20 terence. 4th Inflation of balloon - Duration = 10 seconds.

## 2023-04-11 NOTE — Clinical Note
Lesion located in the mid right coronary artery. Stenosis (%): 70. The pre interventional distal flow is normal.

## 2023-04-11 NOTE — Clinical Note
First balloon inflation max pressure = 12 terence. First balloon inflation duration = 13 seconds. Second inflation of balloon - Max pressure = 12 terence. 2nd Inflation of balloon - Duration = 10 seconds. Third inflation of balloon - Max pressure = 14 terence. 3rd Inflation of balloon - Duration = 11 seconds. Fourth inflation of balloon - Max pressure = 14 terence. 4th Inflation of balloon - Duration = 10 seconds.

## 2023-04-12 VITALS
OXYGEN SATURATION: 95 % | BODY MASS INDEX: 24.36 KG/M2 | DIASTOLIC BLOOD PRESSURE: 60 MMHG | HEART RATE: 65 BPM | TEMPERATURE: 96.9 F | WEIGHT: 146.2 LBS | SYSTOLIC BLOOD PRESSURE: 121 MMHG | HEIGHT: 65 IN | RESPIRATION RATE: 18 BRPM

## 2023-04-12 LAB
ANION GAP SERPL CALCULATED.3IONS-SCNC: 9 MMOL/L (ref 5–15)
BUN SERPL-MCNC: 9 MG/DL (ref 8–23)
BUN/CREAT SERPL: 11.3 (ref 7–25)
CALCIUM SPEC-SCNC: 8.7 MG/DL (ref 8.6–10.5)
CHLORIDE SERPL-SCNC: 99 MMOL/L (ref 98–107)
CO2 SERPL-SCNC: 23 MMOL/L (ref 22–29)
CREAT SERPL-MCNC: 0.8 MG/DL (ref 0.57–1)
EGFRCR SERPLBLD CKD-EPI 2021: 77.4 ML/MIN/1.73
GLUCOSE SERPL-MCNC: 103 MG/DL (ref 65–99)
HCT VFR BLD AUTO: NORMAL %
HGB BLD-MCNC: NORMAL G/DL
MCH RBC QN AUTO: NORMAL PG
MCHC RBC AUTO-ENTMCNC: NORMAL G/DL
MCV RBC AUTO: NORMAL FL
PLATELET # BLD AUTO: NORMAL 10*3/UL
POTASSIUM SERPL-SCNC: 4.2 MMOL/L (ref 3.5–5.2)
QT INTERVAL: 430 MS
QTC INTERVAL: 436 MS
RBC # BLD AUTO: NORMAL 10*6/UL
SODIUM SERPL-SCNC: 131 MMOL/L (ref 136–145)
WBC NRBC COR # BLD: NORMAL 10*3/UL

## 2023-04-12 PROCEDURE — 63710000001 FLUTICASONE 50 MCG/ACT SUSPENSION 16 G BOTTLE: Performed by: INTERNAL MEDICINE

## 2023-04-12 PROCEDURE — A9270 NON-COVERED ITEM OR SERVICE: HCPCS | Performed by: INTERNAL MEDICINE

## 2023-04-12 PROCEDURE — 63710000001 GABAPENTIN 100 MG CAPSULE: Performed by: INTERNAL MEDICINE

## 2023-04-12 PROCEDURE — 63710000001 CLOPIDOGREL 75 MG TABLET: Performed by: INTERNAL MEDICINE

## 2023-04-12 PROCEDURE — 80048 BASIC METABOLIC PNL TOTAL CA: CPT | Performed by: INTERNAL MEDICINE

## 2023-04-12 PROCEDURE — 63710000001 ASPIRIN 81 MG CHEWABLE TABLET: Performed by: INTERNAL MEDICINE

## 2023-04-12 PROCEDURE — 63710000001 CARVEDILOL 3.125 MG TABLET: Performed by: INTERNAL MEDICINE

## 2023-04-12 PROCEDURE — 63710000001 SUCRALFATE 1 G TABLET: Performed by: INTERNAL MEDICINE

## 2023-04-12 PROCEDURE — 63710000001 FLUOXETINE 20 MG CAPSULE: Performed by: INTERNAL MEDICINE

## 2023-04-12 PROCEDURE — 63710000001 AMLODIPINE 5 MG TABLET: Performed by: INTERNAL MEDICINE

## 2023-04-12 PROCEDURE — 85027 COMPLETE CBC AUTOMATED: CPT | Performed by: INTERNAL MEDICINE

## 2023-04-12 PROCEDURE — 63710000001 PANTOPRAZOLE 40 MG TABLET DELAYED-RELEASE: Performed by: INTERNAL MEDICINE

## 2023-04-12 PROCEDURE — 63710000001 PRAVASTATIN 40 MG TABLET: Performed by: INTERNAL MEDICINE

## 2023-04-12 PROCEDURE — 93005 ELECTROCARDIOGRAM TRACING: CPT | Performed by: INTERNAL MEDICINE

## 2023-04-12 PROCEDURE — 99238 HOSP IP/OBS DSCHRG MGMT 30/<: CPT | Performed by: INTERNAL MEDICINE

## 2023-04-12 RX ORDER — CLOPIDOGREL BISULFATE 75 MG/1
75 TABLET ORAL DAILY
Qty: 90 TABLET | Refills: 1 | Status: SHIPPED | OUTPATIENT
Start: 2023-04-13

## 2023-04-12 RX ADMIN — PRAVASTATIN SODIUM 40 MG: 40 TABLET ORAL at 08:38

## 2023-04-12 RX ADMIN — CLOPIDOGREL BISULFATE 75 MG: 75 TABLET ORAL at 08:38

## 2023-04-12 RX ADMIN — GABAPENTIN 200 MG: 100 CAPSULE ORAL at 08:37

## 2023-04-12 RX ADMIN — FLUOXETINE HYDROCHLORIDE 20 MG: 20 CAPSULE ORAL at 08:37

## 2023-04-12 RX ADMIN — CARVEDILOL 3.12 MG: 3.12 TABLET, FILM COATED ORAL at 08:38

## 2023-04-12 RX ADMIN — AMLODIPINE BESYLATE 5 MG: 5 TABLET ORAL at 08:38

## 2023-04-12 RX ADMIN — FLUTICASONE PROPIONATE 1 SPRAY: 50 SPRAY, METERED NASAL at 08:38

## 2023-04-12 RX ADMIN — ASPIRIN 81 MG: 81 TABLET, CHEWABLE ORAL at 08:38

## 2023-04-12 RX ADMIN — SUCRALFATE 1 G: 1 TABLET ORAL at 08:37

## 2023-04-12 RX ADMIN — PANTOPRAZOLE SODIUM 40 MG: 40 TABLET, DELAYED RELEASE ORAL at 05:25

## 2023-04-12 NOTE — DISCHARGE SUMMARY
[facility] CARDIOLOGY  [address] [place] [address]. [id number]  T - [phone]     DISCHARGE SUMMARY         PATIENT  DEMOGRAPHICS   PATIENT NAME: Fouzia Maldonado                      PHYSICIAN: Dr. Garner  : 1950  MRN: [id number]    ADMISSION/DISCHARGE INFO   ADMISSION DATE: 2023     DISCHARGE DATE: 2023    ADMISSION DIAGNOSES: Abnormal nuclear stress test [R94.39]  DISCHARGE DIAGNOSES:   1. Abnormal nuclear stress test          Abnormal nuclear stress test      ATTENDING PROVIDER: Dr. Garner       CONSULTS   Consult Orders (all) (From admission, onward)     Start     Ordered    23 1009  Cardiac Rehab Evaluation and Enrollment  Once        Provider:  (Not yet assigned)    23 1009               Consults     No orders found for last 30 day(s).        PROCEDURES   Results for orders placed in visit on 21    Adult Transthoracic Echo Complete W/ Cont if Necessary Per Protocol    Interpretation Summary  · Left ventricular wall thickness is consistent with mild concentric hypertrophy.  · Estimated left ventricular EF = 61% Left ventricular ejection fraction appears to be 61 - 65%. Left ventricular systolic function is normal.  · Left ventricular diastolic function is consistent with (grade Ia w/high LAP) impaired relaxation.  · Moderate mitral valve regurgitation is present.  · Moderate tricuspid valve regurgitation is present.  · Estimated right ventricular systolic pressure from tricuspid regurgitation is mildly elevated (35-45 mmHg).  · Mild pulmonary hypertension is present.      @LPGRADLAST    No radiology results for the last 30 days.    Cardiac Catheterization/Vascular Study    Result Date: 2023  Narrative: Images from the original result were not included. [facility] CARDIAC CATHETERIZATION NOTE : Ene Garner MD 2023 Procedure: 1.  Left heart catheterization 2.  Selective coronary angiography 3.   Successful PCI of RCA 4.  Intravascular ultrasound Indications: 1.  Angina CCS class III 2.  Abnormal stress test Site of Entry:  Right radial artery Catheters used: 5F Tiger 4.0 and 6F JR 4 Guide Course: Informed consent was obtained from the patient after explaining risks/benefits and alternatives. The patient was brought to the cath lab and prepped and draped in the sterile fashion. Timeout was performed. Lidocaine was used for local anesthesia. Access was obtained in the access: Right radial artery using micropuncture and modified Seldinger technique and a  6Fr sheath was placed over the wire. Catheter exchanges were made over a .035 guide under fluoroscopic guidance. 5F Tiger 4.0 was used to engage the Left main. Multiple cineographic images were taken in orthogonal view. Subsequently 5F Tiger 4.0 was used to engage the RCA and multiple cineographic image were taken in orthogonal views. 5F Tiger 4.0 was used to cross the aortic valve. Filling pressures were recorded and pull back was performed. Images reviewed. Catheter removed over the wire. Details of PCI of RCA: 6 Welsh JR4 guide catheter was used to engage the RCA.  After confirmed ACT was therapeutic run-through wire was used to cross the lesion in the mid RCA and parked in the RPDA.  Intravascular ultrasound showed severe plaque burden at the distal RCA right before the bifurcation with severe diffuse disease throughout the proximal and mid RCA.  Predilation was performed with a 4.0 compliant balloon and a 3.5 [place] cutting balloon.  Subsequently 4.0 x 38 mm and a 4.0x23 mm Xience/theresa point stents were deployed in overlapping fashion from the proximal to distal RCA.  Post dilation was performed with a 4.0 noncompliant balloon at the mid and distal segment and a 5.0 compliant balloon at the proximal segment with excellent angiographic results reducing lesion from 80% to 0% with FALGUNI-3 flow.  Intravascular ultrasound was used for optimal stent sizing,  stent apposition and rule out edge dissection. Final angiogram was without any evidence of guide catheter trauma wire perforation or edge dissection.  Patient tolerated the procedure well without complications. TR band was applied for hemostasis in the right radial artery. Findings: Hemodynamics: Aortic Pressure: 113/57 map of 81 mmHg   LVEDP: 8 mmHg  Gradient across aortic valve: No significant gradient noted on pull back            Ventriculography: Not performed Selective coronary angiography: 1. Left Main: Left main is a large caliber vessel which arises from left coronary cusp and gives rise to LAD and LCX.  Distal left main has mild eccentric stenosis about 30%. FALGUNI III flow was noted. 2. Left anterior descending coronary artery: LAD is a medium caliber vessel which gives rise to several septal  and diagonal branches as it runs in anterior interventricular groove and reaches the apex.  Mid LAD after the takeoff of the second diagonal has mild eccentric stenosis about 40%.  Diagonal 1 is a small caliber less than 2 mm vessel which has moderate diffuse disease of about 50 to 60% proximal segment. Diagonal 2 is a medium caliber vessel which has 2 tandem lesions of mild severity approaching 40%. FALGUNI III flow was noted 3. Left circumflex coronary artery: Circumflex is a medium caliber nondominant vessel which gives rise to small caliber OM1 followed by medium caliber OM 2 after which it runs a small vessel in the posterior AV groove.  OM 2 is a medium caliber vessel which has a long segment of mild eccentric stenosis about 30% in proximal segment. FALGUNI III flow was noted 4. Right Coronary artery: RCA is a large-caliber dominant vessel with a high bifurcation.  Proximal segment has a very eccentric severe stenosis approaching 80% followed by severe diffuse disease in the mid and distal segment with 2 focal lesions approaching 70%.  Intravascular ultrasound showed moderate calcification and significant  plaque burden throughout the proximal and mid RCA. RPDA is medium in caliber but long vessel which has mild eccentric stenosis about 30% at the ostium.  RPL is large caliber vessel which is free of angiographic evidence of obstructive coronary artery disease FALGUNI III flow was noted Conclusion: 1.  One-vessel obstructive coronary artery disease involving RCA. 2.  Successful PCI of RCA guided by intravascular ultrasound 3.  Normal left-sided filling pressures Complications: None EBL: 5ml Specimen: None Recommmendations: 1.  Continue dual antiplatelet with aspirin Plavix for a minimum of 1 year 2.  Aggressive risk factor modification for secondary prevention 3.  IV fluids per protocol for prevention of KRUNAL This document has been electronically signed by Ene Garner MD on April 11, 2023 10:11 CDT   Part of this note may be an electronic transcription/translation of spoken language to printed text using the Dragon Dictation System.     Stress Test With Myocardial Perfusion One Day    Result Date: 3/29/2023  Narrative: •  Left ventricular ejection fraction is normal (Calculated EF = 65%). •  Myocardial perfusion imaging indicates a small-sized, mildly severe area of ischemia located in the apex. •  Impressions are consistent with an intermediate risk study.     Mobile Cardiac Outpatient Telemetry    Result Date: 4/5/2023  Narrative: MCT for duration of 7days Indication palpitation Description Sinus rhythm with intermittent sinus tachycardia with good average heart rate 72 minimum 50 maximum 96 bpm.  Rare isolated premature ventricular Complex.  Normal burden of premature supraventricular beat beat with rare couplet and triplet.  No significant bradyarrhythmia such as persistent heart rate less than 40 bpm Clinical impression Sinus rhythm with average heart rate 72 minimum 51 maximum 96 bpm without significant bradyarrhythmia or pause. #2 normal burden of premature atrial and ventricular complex. Rare atrial couplets and  "triplets       HISTORY OF PRESENT ILLNESS   Fouzia Maldonado is a 72 y.o. female \"history of hypertension, hyperlipidemia, moderate mitral regurgitation, known nonobstructive coronary artery disease had previously been seen by Dr. Matthews and recently saw Dr. Benz -for worsening shortness of breath affecting her quality of life.  She has a known history of coronary artery disease with a calcium score of 700 with known moderate disease in the RCA and mild disease in the LAD and left circumflex and left main.  She had been treated conservatively previously however with worsening shortness of breath concerning for potential angina Patient Underwent Nuclear Stress Test Which Was concerning for Apical Ischemia.  Continues to Have Significant Symptoms Affecting Her Quality of Life.  Has Been on Appropriate Medical Therapy with Aspirin/Amlodipine/Imdur/Metoprolol.  Patient Was Recommended Cardiac Cath for Further Evaluation.\" H and P from Dr. Garner.     DIAGNOSTIC DATA     ECG:    Normal sinus rhythm  Low voltage QRS  Borderline ECG  When compared with ECG of 29-MAR-2023 08:50,  No significant change was found    Echocardiogram: Not performed    Ejection Fraction  Lab Results   Component Value Date    EF 65 03/29/2023       Echo EF Estimated  Lab Results   Component Value Date    ECHOEFEST 61 11/17/2021       Nuclear Stress Ejection Fraction  No components found for: NUCEF    Cath Ejection Fraction Quantitative  No results found for: CATHEF    Procedure(s):  Left Heart Cath         HOSPITAL COURSE   Patient is a 72 y.o. female presented with above medical history of hypertension, hyperlipidemia, moderate mitral valve regurgitation, previous history of nonobstructive CAD, following with Dr. Matthews and most recently seen Dr. Benz for worsening symptoms of shortness of breath.  She was noted to have moderate disease on previous coronary CTA, due to new concerning symptoms she underwent nuclear stress test which showed " "intermediate risk study with small size infarct in the apex.  She was recommended for definitive evaluation with cardiac catheterization per Dr. Garner.  She presented through same-day surgery for scheduled cardiac catheterization with the following impression:    Conclusion:  1.  One-vessel obstructive coronary artery disease involving RCA.  2.  Successful PCI of RCA guided by intravascular ultrasound  3.  Normal left-sided filling pressures     Complications:  None  EBL: 5ml   Specimen: None        Recommmendations:   1.  Continue dual antiplatelet with aspirin Plavix for a minimum of 1 year  2.  Aggressive risk factor modification for secondary prevention  3.  IV fluids per protocol for prevention of KRUNAL     He is status post successful PCI to RCA x2.  No acute events overnight.  Vital signs stable.  H&H and kidney function stable.  Right radial cath site within normal limits.  Patient has ambulated without complications.  She denies chest pain, edema, palpitations.  She reports some improvement in her breathing.  Will be discharged home with dual antiplatelet therapy with aspirin and Plavix, lipid-lowering therapy with pravastatin 40 mg, carvedilol 3.125 mg twice daily, Norvasc 5 mg.    Postop instructions discussed with patient including no driving x2 days, no heavy lifting greater than 10 pounds x 5 days.  Extensive medication education was done as well as the importance of compliance of new medications. The patient was given handouts by nursing. Questions were answered. Side effects were discussed. The patient was encouraged to not stop ANY medications until contacting their PCP or Cardiology office to discuss the side effects. Over the counter supplements such as CoQ10 was encouraged for any leg cramps associated with the \"statin\" medications.   The patient was encouraged to stop smoking if they smoke, eat a low sodium diet, and get regular exercise. The importance of lifestyle changes was " stressed.    Follow up: Dr. Garner 1 month     DISCHARGE CONDITION   Condition on Discharge: Stable    Vital Signs  Temp:  [96.2 °F (35.7 °C)-98.5 °F (36.9 °C)] 96.8 °F (36 °C)  Heart Rate:  [58-76] 65  Resp:  [18] 18  BP: ()/(55-70) 146/66    Physical Exam:  Physical Exam  Vitals and nursing note reviewed.   Constitutional:       General: She is not in acute distress.     Appearance: Normal appearance. She is well-developed and well-groomed. She is not ill-appearing, toxic-appearing or diaphoretic.   HENT:      Head: Normocephalic.      Right Ear: External ear normal.      Left Ear: External ear normal.   Eyes:      General: Lids are normal.      Pupils: Pupils are equal, round, and reactive to light.   Neck:      Thyroid: No thyromegaly.      Vascular: No carotid bruit or JVD.      Trachea: No tracheal deviation.   Cardiovascular:      Rate and Rhythm: Normal rate and regular rhythm.      Heart sounds: Normal heart sounds, S1 normal and S2 normal. No murmur heard.    No friction rub. No gallop.   Pulmonary:      Effort: Pulmonary effort is normal. No respiratory distress.      Breath sounds: Normal breath sounds. No stridor. No wheezing or rales.   Chest:      Chest wall: No tenderness.   Abdominal:      General: Bowel sounds are normal. There is no distension.      Palpations: Abdomen is soft.      Tenderness: There is no abdominal tenderness.   Musculoskeletal:      Right lower leg: No edema.      Left lower leg: No edema.   Skin:     General: Skin is warm and dry.      Findings: No erythema or rash.          Neurological:      Mental Status: She is alert and oriented to person, place, and time.      Cranial Nerves: No cranial nerve deficit.      Deep Tendon Reflexes: Reflexes are normal and symmetric. Reflexes normal.   Psychiatric:         Behavior: Behavior normal.         Thought Content: Thought content normal.         Judgment: Judgment normal.         DISPOSITION   To Home      DISCHARGE MEDICATIONS         Your medication list      START taking these medications      Instructions Last Dose Given Next Dose Due   clopidogrel 75 MG tablet  Commonly known as: PLAVIX  Start taking on: April 13, 2023      Take 1 tablet by mouth Daily.          CONTINUE taking these medications      Instructions Last Dose Given Next Dose Due   amLODIPine 10 MG tablet  Commonly known as: NORVASC      Take 0.5 tablets by mouth Daily.       ascorbic acid 1000 MG tablet  Commonly known as: VITAMIN C      Take 1 capsule by mouth Daily.       aspirin 81 MG chewable tablet      Chew 1 tablet Daily.       carvedilol 3.125 MG tablet  Commonly known as: COREG      Take 1 tablet by mouth 2 (Two) Times a Day.       Coenzyme Q10 200 MG capsule      Take 1 tablet by mouth Daily.       enalapril 10 MG tablet  Commonly known as: VASOTEC      Take 0.5 tablets by mouth Daily As Needed (for SBP >160).       fish oil 1000 MG capsule capsule      Take 1 capsule by mouth Daily.       FLUoxetine 20 MG capsule  Commonly known as: PROzac      Take 1 capsule by mouth Daily.       fluticasone 50 MCG/ACT nasal spray  Commonly known as: FLONASE           gabapentin 100 MG capsule  Commonly known as: NEURONTIN      Take 2 capsules by mouth 3 (Three) Times a Day.       hydroCHLOROthiazide 12.5 MG capsule  Commonly known as: MICROZIDE      Take 1 capsule by mouth Daily.       isosorbide mononitrate 30 MG 24 hr tablet  Commonly known as: IMDUR      Take 1 tablet by mouth Daily.       Lopid 600 MG tablet  Generic drug: gemfibrozil      Take 1 tablet by mouth 2 (Two) Times a Day Before Meals.       MAGNESIA PO      Take 250 mg by mouth Daily.       meclizine 25 MG tablet  Commonly known as: ANTIVERT           meloxicam 15 MG tablet  Commonly known as: MOBIC      Take 1 tablet by mouth Daily.       omeprazole 40 MG capsule  Commonly known as: priLOSEC      As Needed.       ondansetron 4 MG tablet  Commonly known as: ZOFRAN      Take 1 tablet by mouth As Needed.        pantoprazole 40 MG EC tablet  Commonly known as: PROTONIX      Take 1 tablet by mouth 2 (Two) Times a Day.       Potassium 99 MG tablet      Take 1 tablet by mouth Every Night.       pravastatin 40 MG tablet  Commonly known as: PRAVACHOL      Take 1 tablet by mouth Daily.       PROBIOTIC DAILY PO      Daily.       sucralfate 1 g tablet  Commonly known as: CARAFATE      Take 1 tablet by mouth 4 (Four) Times a Day.       temazepam 30 MG capsule  Commonly known as: RESTORIL      Take 1 capsule by mouth Every Night.       vitamin B-12 1000 MCG tablet  Commonly known as: CYANOCOBALAMIN      Take 1 tablet by mouth Daily.       vitamin D 1.25 MG (12083 UT) capsule capsule  Commonly known as: ERGOCALCIFEROL      Take 1 capsule by mouth 1 (One) Time Per Week.             Where to Get Your Medications      These medications were sent to [facility] - [place] - [address] - [phone] [facility] [phone] [address] [address]    Phone: [phone] ·   clopidogrel 75 MG tablet         INSTRUCTIONS   Activity:   Activity Instructions     Driving Restrictions      Type of Restriction: Driving    Driving Restrictions: No Driving (Time Limited)    Length: Other    Indicate Length of Restriction: 48 hrs    Gradually Increase Activity Until at Pre-Hospitalization Level      Lifting Restrictions      Type of Restriction: Lifting    Lifting Restrictions: Lifting Restriction (Indicate Limit)    Weight Limit (Pounds): 10    Length of Lifting Restriction: 5 days          Diet:   Diet Instructions     Advance Diet As Tolerated -Target Diet: Cardiac Diet      Target Diet: Cardiac Diet          Special Instructions: Patient instructed to call M.D. or return to ED with worsening shortness of breath, chest pain, fever greater than 100.4°F or any other medical concerns.    FOLLOW UP   Additional Instructions for the Follow-ups that You Need to Schedule     Discharge Follow-up with Specified Provider: Dr. Garner; 1  Month   As directed      To: Dr. Garner    Follow Up: 1 Month            Follow-up Information     Fabiana Yi MD .    Specialty: Internal Medicine  Contact information:  [place] [address]  [phone]                         Follow-up Appointments  Future Appointments   Date Time Provider Department Center   5/10/2023  2:00 PM NURSE BH [facility] BH [facility] OPI [facility]   5/10/2023  2:30 PM Shukri Jones MD [facility] ONC [facility]   6/7/2023  4:00 PM M[facility] ECHO ROOM 2 [facility] CARD [facility]   6/20/2023  2:45 PM Robbin Matthews MD [facility] CD MAD None     Additional Instructions for the Follow-ups that You Need to Schedule     Discharge Follow-up with Specified Provider: Dr. Garner; 1 Month   As directed      To: Dr. Garner    Follow Up: 1 Month              PENDING TEST RESULTS AT DISCHARGE   Pending Labs     Order Current Status    CBC (No Diff) In process         TIME   Time: 38 minutes were spent planning this discharge.          Dr. Garner  is the attending at time of discharge, He is aware of the patient's status and agrees with the above discharge summary.             This document has been electronically signed by LILA Hdez on April 12, 2023 10:18 CDT     Electronically signed by LILA Hdez, 04/12/23, 10:18 AM CDT.      I personally saw and examined Fouzia Maldonado after the APRN.  I personally performed a history and physical examination of the patient.  I personally reviewed independent findings and plan of care.  I discussed management with the APRN.  I agree with the APRN's documentation.    This is a 72-year-old female with history of hypertension, hyperlipidemia, moderate mitral regurgitation, known nonobstructive coronary artery disease had previously been seen by Dr. Matthews and recently saw Dr. Benz -for worsening shortness of breath affecting her quality of life.  She has a known history of coronary artery disease with a calcium score of 700 with known moderate  disease in the RCA and mild disease in the LAD and left circumflex and left main.  She had been treated conservatively previously however with worsening shortness of breath concerning for potential angina Patient Underwent Nuclear Stress Test Which Was concerning for Apical Ischemia.  Continues to Have Significant Symptoms Affecting Her Quality of Life.  Has Been on Appropriate Medical Therapy with Aspirin/Amlodipine/Imdur/Metoprolol.  Patient Was Recommended Cardiac Cath for Further Evaluation.    Cardiac cath showed severe disease in RCA for which patient underwent successful PCI.   Patient was started on asa/plavix and continued on amlodipine/coreg/Imdur.   Radial site healed well.   No events on tele and patient recovered well.     Patient will be enrolled in cardiac rehab and f/up in 4 weeks            Electronically signed by Ene Garner MD, 04/12/23, 3:15 PM CDT.

## 2023-04-21 ENCOUNTER — DOCUMENTATION (OUTPATIENT)
Dept: CARDIAC REHAB | Facility: HOSPITAL | Age: 73
End: 2023-04-21
Payer: MEDICARE

## 2023-05-01 RX ORDER — AMLODIPINE BESYLATE 10 MG/1
TABLET ORAL
Qty: 45 TABLET | Refills: 3 | Status: SHIPPED | OUTPATIENT
Start: 2023-05-01

## 2023-05-05 LAB
QT INTERVAL: 430 MS
QTC INTERVAL: 436 MS

## 2023-05-10 ENCOUNTER — LAB (OUTPATIENT)
Dept: ONCOLOGY | Facility: HOSPITAL | Age: 73
End: 2023-05-10
Payer: MEDICARE

## 2023-05-10 ENCOUNTER — OFFICE VISIT (OUTPATIENT)
Dept: ONCOLOGY | Facility: CLINIC | Age: 73
End: 2023-05-10
Payer: MEDICARE

## 2023-05-10 VITALS
OXYGEN SATURATION: 98 % | HEART RATE: 74 BPM | BODY MASS INDEX: 24.67 KG/M2 | SYSTOLIC BLOOD PRESSURE: 133 MMHG | WEIGHT: 146 LBS | DIASTOLIC BLOOD PRESSURE: 70 MMHG | RESPIRATION RATE: 18 BRPM

## 2023-05-10 DIAGNOSIS — D50.9 IRON DEFICIENCY ANEMIA, UNSPECIFIED IRON DEFICIENCY ANEMIA TYPE: Primary | ICD-10-CM

## 2023-05-10 DIAGNOSIS — D50.9 IRON DEFICIENCY ANEMIA, UNSPECIFIED IRON DEFICIENCY ANEMIA TYPE: ICD-10-CM

## 2023-05-10 LAB
DEPRECATED RDW RBC AUTO: 39.7 FL (ref 37–54)
ERYTHROCYTE [DISTWIDTH] IN BLOOD BY AUTOMATED COUNT: 13.3 % (ref 12.3–15.4)
FERRITIN SERPL-MCNC: 58.16 NG/ML (ref 13–150)
HCT VFR BLD AUTO: 35 % (ref 34–46.6)
HGB BLD-MCNC: 12.1 G/DL (ref 12–15.9)
HOLD SPECIMEN: NORMAL
IRON 24H UR-MRATE: 69 MCG/DL (ref 37–145)
IRON SATN MFR SERPL: 22 % (ref 20–50)
MCH RBC QN AUTO: 28.2 PG (ref 26.6–33)
MCHC RBC AUTO-ENTMCNC: 34.6 G/DL (ref 31.5–35.7)
MCV RBC AUTO: 81.6 FL (ref 79–97)
PLATELET # BLD AUTO: 259 10*3/MM3 (ref 140–450)
PMV BLD AUTO: 9.3 FL (ref 6–12)
RBC # BLD AUTO: 4.29 10*6/MM3 (ref 3.77–5.28)
TIBC SERPL-MCNC: 319 MCG/DL (ref 298–536)
TRANSFERRIN SERPL-MCNC: 214 MG/DL (ref 200–360)
WBC NRBC COR # BLD: 7.31 10*3/MM3 (ref 3.4–10.8)

## 2023-05-10 NOTE — PROGRESS NOTES
"Chief Complaint  Follow-up -iron deficiency anemia    Subjective        Fouzia Zuleika Maldonado presents to [facility]  History of Present Illness     Since her last visit she was diagnosed with coronary artery disease and underwent stent placement.  She is current taking aspirin and Plavix.  Feels well.  Easy bruising however no bleeding.      Objective   Vital Signs:  /70   Pulse 74   Resp 18   Wt 66.2 kg (146 lb)   SpO2 98%   BMI 24.67 kg/m²   Estimated body mass index is 24.67 kg/m² as calculated from the following:    Height as of 4/11/23: 163.8 cm (64.5\").    Weight as of this encounter: 66.2 kg (146 lb).       BMI is within normal parameters. No other follow-up for BMI required.      Physical Exam  Vitals and nursing note reviewed.   Constitutional:       Appearance: Normal appearance.   Neurological:      General: No focal deficit present.      Mental Status: She is alert and oriented to person, place, and time. Mental status is at baseline.   Psychiatric:         Mood and Affect: Mood normal.         Behavior: Behavior normal.         Thought Content: Thought content normal.        Result Review :  The following data was reviewed by: Shukri Jones MD on 05/10/2023:  Common labs        4/11/2023    07:30 4/12/2023    05:19 5/10/2023    13:45   Common Labs   Glucose 104   103      BUN 12   9      Creatinine 1.00   0.80      Sodium 128   131      Potassium 3.4   4.2      Chloride 93   99      Calcium 9.2   8.7      WBC   7.31     Hemoglobin   12.1     Hematocrit   35.0     Platelets   259       CMP        4/11/2023    07:30 4/12/2023    05:19   CMP   Glucose 104   103     BUN 12   9     Creatinine 1.00   0.80     EGFR 59.2   77.4     Sodium 128   131     Potassium 3.4   4.2     Chloride 93   99     Calcium 9.2   8.7     BUN/Creatinine Ratio 12.0   11.3     Anion Gap 10.0   9.0       CBC        5/10/2023    13:45   CBC   WBC 7.31     RBC 4.29   "   Hemoglobin 12.1     Hematocrit 35.0     MCV 81.6     MCH 28.2     MCHC 34.6     RDW 13.3     Platelets 259       CBC w/diff        5/10/2023    13:45   CBC w/Diff   WBC 7.31     RBC 4.29     Hemoglobin 12.1     Hematocrit 35.0     MCV 81.6     MCH 28.2     MCHC 34.6     RDW 13.3     Platelets 259       Anemia labs:      Lab 05/10/23  1345   IRON 69   IRON SATURATION 22   TIBC 319   TRANSFERRIN 214   FERRITIN 58.16     Fouzia Maldonado reports a pain score of 0.  Given her pain assessment as noted, treatment options were discussed and the following options were decided upon as a follow-up plan to address the patient's pain: continuation of current treatment plan for pain.    Patient screened negative for depression based on a PHQ-9 score of 0 on 5/10/2023.     Advance Care Planning   ACP discussion was declined by the patient. Patient does not have an advance directive, declines further assistance.         Assessment and Plan   Diagnoses and all orders for this visit:    1. Iron deficiency anemia, unspecified iron deficiency anemia type (Primary)  -     CBC (No Diff); Future  -     Ferritin; Future  -     Iron Profile; Future  -     CBC (No Diff); Future  -     Ferritin; Future  -     Iron Profile; Future    Chronic, stable.  S/p IV iron treatment.  Hemoglobin and iron studies have improved.  Continue monitoring.  She was started on aspirin and Plavix after recent cardiac stent placement.  No obvious bleeding.    Check CBC, ferritin, iron profile every 2 months.  I will see her back in 4 months.           Follow Up   No follow-ups on file.  Patient was given instructions and counseling regarding her condition or for health maintenance advice. Please see specific information pulled into the AVS if appropriate.

## 2023-05-11 ENCOUNTER — TELEPHONE (OUTPATIENT)
Dept: ONCOLOGY | Facility: CLINIC | Age: 73
End: 2023-05-11
Payer: MEDICARE

## 2023-05-11 NOTE — TELEPHONE ENCOUNTER
----- Message from Shukri Jones MD sent at 5/10/2023  2:21 PM CDT -----  Iron is normal  , but on low normal side. Recheck studies in 2 months.

## 2023-05-25 ENCOUNTER — OFFICE VISIT (OUTPATIENT)
Dept: CARDIOLOGY | Facility: CLINIC | Age: 73
End: 2023-05-25
Payer: MEDICARE

## 2023-05-25 VITALS
HEART RATE: 62 BPM | SYSTOLIC BLOOD PRESSURE: 116 MMHG | DIASTOLIC BLOOD PRESSURE: 74 MMHG | WEIGHT: 147 LBS | HEIGHT: 65 IN | OXYGEN SATURATION: 98 % | BODY MASS INDEX: 24.49 KG/M2

## 2023-05-25 DIAGNOSIS — I25.10 CORONARY ARTERY DISEASE INVOLVING NATIVE CORONARY ARTERY OF NATIVE HEART WITHOUT ANGINA PECTORIS: ICD-10-CM

## 2023-05-25 DIAGNOSIS — I10 ESSENTIAL HYPERTENSION: Primary | ICD-10-CM

## 2023-05-25 DIAGNOSIS — R06.00 DYSPNEA, UNSPECIFIED TYPE: ICD-10-CM

## 2023-05-25 NOTE — PROGRESS NOTES
[facility]iology  OFFICE NOTE    Cardiovascular Medicine  Ene Garner M.D., [facility], [facility], [facility]         No referring provider defined for this encounter.    Thank you for asking me to see Fouzia Maldonado for CAD.    History of Present Illness  This is a 73 y.o. female with:    1.  Coronary artery disease   2.  Hypertension  3.  Hyperlipidemia  4.  Mitral regurgitation.    Fouzia Maldonado is a 73 y.o. female who presents for consultation today.  Patient went cardiac cath in April 2023, this showed one-vessel obstructive coronary artery disease in the RCA for which patient underwent successful PCI with a 4.0 x 38 and a 4.0 x 23 mm Xience/theresa point stents.  He was noted to have nonobstructive disease in the LAD and left circumflex system.  Patient was started on aspirin and Plavix and continued on amlodipine, carvedilol, Imdur.  Previous echocardiogram which showed preserved LV systolic function.    Has recovered well.  Feeling significant improvement in her symptoms.  Denying any chest pain.  Tolerating antiplatelets.    Review of Systems - ROS  Constitution: Negative for weakness, weight gain and weight loss.   HENT: Negative for congestion.    Eyes: Negative for blurred vision.   Cardiovascular: As mentioned above  Respiratory: Negative for cough and hemoptysis.    Endocrine: Negative for polydipsia and polyuria.   Hematologic/Lymphatic: Negative for bleeding problem. Does not bruise/bleed easily.   Skin: Negative for flushing.   Musculoskeletal: Negative for neck pain and stiffness.   Gastrointestinal: Negative for abdominal pain, diarrhea, jaundice, melena, nausea and vomiting.   Genitourinary: Negative for dysuria and hematuria.   Neurological: Negative for dizziness, focal weakness and numbness.   Psychiatric/Behavioral: Negative for altered mental status and depression.     I reviewed the ROS as documented here and confirmed the accuracy of it with the patient today. 5/25/2023         All other systems were reviewed and were negative.    family history includes Alcohol abuse in her father; Cancer in her maternal grandfather, maternal grandmother, mother, and paternal grandfather; Early death (age of onset: 56) in her father; Early death (age of onset: 68) in her mother; Heart disease in her father and mother; Hyperlipidemia in her father and mother; Hypertension in her father and mother.     reports that she quit smoking about 17 years ago. Her smoking use included cigarettes. She has never used smokeless tobacco. She reports that she does not drink alcohol and does not use drugs.    Allergies   Allergen Reactions   • Amoxicillin Rash   • Darvon [Propoxyphene] Rash         Current Outpatient Medications:   •  amLODIPine (NORVASC) 10 MG tablet, TAKE 1/2 TABLET EVERY DAY, Disp: 45 tablet, Rfl: 3  •  ascorbic acid (VITAMIN C) 1000 MG tablet, Take 1 capsule by mouth Daily., Disp: , Rfl:   •  aspirin 81 MG chewable tablet, Chew 1 tablet Daily., Disp: , Rfl:   •  carvedilol (COREG) 3.125 MG tablet, Take 1 tablet by mouth 2 (Two) Times a Day., Disp: 180 tablet, Rfl: 3  •  clopidogrel (PLAVIX) 75 MG tablet, Take 1 tablet by mouth Daily., Disp: 90 tablet, Rfl: 1  •  Coenzyme Q10 200 MG capsule, Take 1 tablet by mouth Daily., Disp: , Rfl:   •  enalapril (VASOTEC) 10 MG tablet, Take 0.5 tablets by mouth Daily As Needed (for SBP >160)., Disp: , Rfl:   •  FLUoxetine (PROzac) 20 MG capsule, Take 1 capsule by mouth Daily., Disp: , Rfl:   •  fluticasone (FLONASE) 50 MCG/ACT nasal spray, , Disp: , Rfl:   •  gabapentin (NEURONTIN) 100 MG capsule, Take 2 capsules by mouth 3 (Three) Times a Day., Disp: , Rfl:   •  gemfibrozil (Lopid) 600 MG tablet, Take 1 tablet by mouth 2 (Two) Times a Day Before Meals., Disp: , Rfl:   •  hydroCHLOROthiazide (MICROZIDE) 12.5 MG capsule, Take 1 capsule by mouth Daily., Disp: 90 capsule, Rfl: 3  •  isosorbide mononitrate (IMDUR) 30 MG 24 hr tablet, Take 1 tablet by mouth  "Daily., Disp: 90 tablet, Rfl: 3  •  Magnesium Hydroxide (MAGNESIA PO), Take 250 mg by mouth Daily., Disp: , Rfl:   •  meclizine (ANTIVERT) 25 MG tablet, , Disp: , Rfl:   •  meloxicam (MOBIC) 15 MG tablet, Take 1 tablet by mouth Daily., Disp: , Rfl:   •  Omega-3 Fatty Acids (fish oil) 1000 MG capsule capsule, Take 1 capsule by mouth Daily., Disp: , Rfl:   •  omeprazole (priLOSEC) 40 MG capsule, As Needed., Disp: , Rfl:   •  ondansetron (ZOFRAN) 4 MG tablet, Take 1 tablet by mouth As Needed., Disp: , Rfl:   •  pantoprazole (PROTONIX) 40 MG EC tablet, Take 1 tablet by mouth 2 (Two) Times a Day., Disp: , Rfl:   •  Potassium 99 MG tablet, Take 1 tablet by mouth Every Night., Disp: , Rfl:   •  pravastatin (PRAVACHOL) 40 MG tablet, Take 1 tablet by mouth Daily., Disp: , Rfl:   •  Probiotic Product (PROBIOTIC DAILY PO), Daily., Disp: , Rfl:   •  sucralfate (CARAFATE) 1 g tablet, Take 1 tablet by mouth 4 (Four) Times a Day., Disp: , Rfl:   •  temazepam (RESTORIL) 30 MG capsule, Take 1 capsule by mouth Every Night., Disp: , Rfl:   •  vitamin B-12 (CYANOCOBALAMIN) 1000 MCG tablet, Take 1 tablet by mouth Daily., Disp: , Rfl:   •  vitamin D (ERGOCALCIFEROL) 71572 UNITS capsule capsule, Take 1 capsule by mouth 1 (One) Time Per Week., Disp: , Rfl:     Physical Exam:  Vitals:    05/25/23 0940   BP: 116/74   BP Location: Right arm   Patient Position: Sitting   Cuff Size: Adult   Pulse: 62   SpO2: 98%   Weight: 66.7 kg (147 lb)   Height: 163.8 cm (64.5\")   PainSc: 0-No pain     Current Pain Level: none  Pulse Ox: Normal  on room air  General: alert, appears stated age and cooperative     Body Habitus: well-nourished    HEENT: Head: Normocephalic, no lesions, without obvious abnormality. No arcus senilis, xanthelasma or xanthomas.    Neuro: alert, oriented x3  Pulses: 2+ and symmetric  JVP: Volume/Pulsation: Normal.  Normal waveforms.   Appropriate inspiratory decrease.  No Kussmaul's. No Anish's.   Carotid Exam: no bruit normal " pulsation bilaterally   Carotid Volume: normal.     Respirations: no increased work of breathing   Chest:  Normal    Pulmonary:Normal   Precordium: Normal impulses. P2 is not palpable.  RV Heave: absent  LV Heave: absent  Dillon:  normal size and placement  Palpable S4: absent.  Heart rate: normal    Heart Rhythm: regular     Heart Sounds: S1: normal  S2: normal  S3: absent   S4: absent  Opening Snap: absent    Pericardial Rub:  Absent: .    Abdomen:   Appearance: normal .  Palpation: Soft, non-tender to palpation, bowel sounds positive in all four quadrants; no guarding or rebound tenderness  Extremity: no edema.   LE Skin: no rashes  LE Hair:  normal  LE Pulses: well perfused with normal pulses in the distal extremities  Pallor on elevation: Absent. Rubor on dependency: None    I have reexamined the patient and the results are consistent with the previously documented exam. Ene Garner MD     DATA REVIEWED:     EKG. I personally reviewed and interpreted the EKG.  normal EKG, normal sinus rhythm    ECG/EMG Results (all)     Procedure Component Value Units Date/Time    ECG 12 Lead [[id number]] Collected: 05/25/23 0945     Updated: 05/25/23 0947     QT Interval 436 ms      QTC Interval 442 ms     Narrative:      Test Reason : htn  Blood Pressure :   */*   mmHG  Vent. Rate :  62 BPM     Atrial Rate :  62 BPM     P-R Int : 156 ms          QRS Dur :  90 ms      QT Int : 436 ms       P-R-T Axes :  71 -19  52 degrees     QTc Int : 442 ms    Normal sinus rhythm  Normal ECG  When compared with ECG of 12-APR-2023 06:59,  Nonspecific T wave abnormality now evident in Lateral leads    Referred By:            Confirmed By:         ---------------------------------------------------  TTE/DAHIANA:  Results for orders placed in visit on 11/17/21    Adult Transthoracic Echo Complete W/ Cont if Necessary Per Protocol    Interpretation Summary  · Left ventricular wall thickness is consistent with mild concentric hypertrophy.  · Estimated  left ventricular EF = 61% Left ventricular ejection fraction appears to be 61 - 65%. Left ventricular systolic function is normal.  · Left ventricular diastolic function is consistent with (grade Ia w/high LAP) impaired relaxation.  · Moderate mitral valve regurgitation is present.  · Moderate tricuspid valve regurgitation is present.  · Estimated right ventricular systolic pressure from tricuspid regurgitation is mildly elevated (35-45 mmHg).  · Mild pulmonary hypertension is present.        --------------------------------------------------------------------------------------------------  LABS:     The CVD Risk score (Mike et al., 2008) failed to calculate for the following reasons:    The 2008 CVD risk score is only valid for ages 30 to 74         Lab Results   Component Value Date    GLUCOSE 103 (H) 04/12/2023    BUN 9 04/12/2023    CREATININE 0.80 04/12/2023    EGFRIFNONA 56 (L) 06/15/2021    BCR 11.3 04/12/2023    K 4.2 04/12/2023    CO2 23.0 04/12/2023    CALCIUM 8.7 04/12/2023    ALBUMIN 4.40 06/15/2021    AST 20 06/15/2021    ALT 8 06/15/2021     Lab Results   Component Value Date    WBC 7.31 05/10/2023    HGB 12.1 05/10/2023    HCT 35.0 05/10/2023    MCV 81.6 05/10/2023     05/10/2023     Lab Results   Component Value Date    CHOL 144 06/15/2021    TRIG 104 06/15/2021    HDL 55 06/15/2021    LDL 70 06/15/2021     Lab Results   Component Value Date    TSH 2.63 03/03/2014     Lab Results   Component Value Date    CKTOTAL 54 06/26/2016    CKMB 1.1 06/26/2016    TROPONINI <0.012 06/26/2016     No results found for: HGBA1C  No results found for: DDIMER  Lab Results   Component Value Date    ALT 8 06/15/2021     No results found for: HGBA1C  Lab Results   Component Value Date    CREATININE 0.80 04/12/2023     Lab Results   Component Value Date    IRON 69 05/10/2023    TIBC 319 05/10/2023    FERRITIN 58.16 05/10/2023     Lab Results   Component Value Date    INR 1.03 04/11/2023    INR 0.98  03/24/2017    INR 1.0 06/26/2016    PROTIME 13.4 04/11/2023    PROTIME 12.9 03/24/2017    PROTIME 13.6 06/26/2016       [unfilled]    1.  Coronary artery disease:  Status post PCI to RCA in April 2023.  Mild to moderate disease in LAD and left circumflex system which was treated conservatively.  Continue aspirin/Plavix/Imdur/Coreg  Chest pain-free.  Repeat echocardiogram pending.    2.  Hypertension: Well-controlled on current regimen    3.  Hyperlipidemia:  On pravastatin.  Previous LDL was 70.  Target LDL less than 55.        Prevention:  BMI is within normal parameters. No other follow-up for BMI required.      Fouzia Maldonado  reports that she quit smoking about 17 years ago. Her smoking use included cigarettes. She has never used smokeless tobacco..        This document has been electronically signed by Ene Garner MD on May 25, 2023 09:49 CDT

## 2023-05-26 LAB
QT INTERVAL: 436 MS
QTC INTERVAL: 442 MS

## 2023-06-02 ENCOUNTER — DOCUMENTATION (OUTPATIENT)
Dept: CARDIAC REHAB | Facility: HOSPITAL | Age: 73
End: 2023-06-02

## 2023-06-15 ENCOUNTER — TELEPHONE (OUTPATIENT)
Dept: CARDIOLOGY | Facility: CLINIC | Age: 73
End: 2023-06-15
Payer: MEDICARE

## 2023-06-15 NOTE — TELEPHONE ENCOUNTER
Ene Garner MD [place], Donna L, MA  Phone Number: [phone]     Preserved systolic function. Grade I DD, Small pericardial effusion          Previous Messages       ----- Message -----  From: Rebekah Arciniega MA  Sent: 6/14/2023   2:10 PM CDT  To: Ene Garner MD  Subject: FW: carlie pt                                    You read it and she sees you in july  ----- Message -----  From: Ailin Manrique  Sent: 6/14/2023  12:47 PM CDT  To: Rebekah Arciniega MA, Kendall Epley, MA  Subject: shamruddy pt                                        Echo results- seen both  and Cassie     patient contacted with echo results per dr. Garner.

## 2023-07-16 PROBLEM — R07.9 CHEST PAIN: Status: ACTIVE | Noted: 2023-07-16

## 2023-08-02 RX ORDER — ISOSORBIDE MONONITRATE 30 MG/1
TABLET, EXTENDED RELEASE ORAL
Qty: 90 TABLET | Refills: 3 | Status: SHIPPED | OUTPATIENT
Start: 2023-08-02

## 2023-08-07 ENCOUNTER — OFFICE VISIT (OUTPATIENT)
Dept: CARDIOLOGY | Facility: CLINIC | Age: 73
End: 2023-08-07
Payer: MEDICARE

## 2023-08-07 VITALS — HEART RATE: 73 BPM | DIASTOLIC BLOOD PRESSURE: 68 MMHG | OXYGEN SATURATION: 99 % | SYSTOLIC BLOOD PRESSURE: 138 MMHG

## 2023-08-07 DIAGNOSIS — E78.2 MIXED HYPERLIPIDEMIA: ICD-10-CM

## 2023-08-07 DIAGNOSIS — I10 ESSENTIAL HYPERTENSION: Primary | ICD-10-CM

## 2023-08-07 DIAGNOSIS — I25.10 CORONARY ARTERY DISEASE INVOLVING NATIVE CORONARY ARTERY OF NATIVE HEART WITHOUT ANGINA PECTORIS: ICD-10-CM

## 2023-08-07 PROCEDURE — 1159F MED LIST DOCD IN RCRD: CPT | Performed by: INTERNAL MEDICINE

## 2023-08-07 PROCEDURE — 3078F DIAST BP <80 MM HG: CPT | Performed by: INTERNAL MEDICINE

## 2023-08-07 PROCEDURE — 99214 OFFICE O/P EST MOD 30 MIN: CPT | Performed by: INTERNAL MEDICINE

## 2023-08-07 PROCEDURE — 3075F SYST BP GE 130 - 139MM HG: CPT | Performed by: INTERNAL MEDICINE

## 2023-08-07 PROCEDURE — 1160F RVW MEDS BY RX/DR IN RCRD: CPT | Performed by: INTERNAL MEDICINE

## 2023-08-07 NOTE — PROGRESS NOTES
[facility] Cardiology  OFFICE NOTE    Cardiovascular Medicine  Ene Garner M.D., [facility], [facility], [facility]         No referring provider defined for this encounter.    Thank you for asking me to see Fouzia Maldonado for CAD.    History of Present Illness  This is a 73 y.o. female with:    1.  Coronary artery disease   2.  Hypertension  3.  Hyperlipidemia  4.  Mitral regurgitation.    Fouzia Maldonado is a 73 y.o. female who presents for consultation today.  Patient went cardiac cath in April 2023, this showed one-vessel obstructive coronary artery disease in the RCA for which patient underwent successful PCI with a 4.0 x 38 and a 4.0 x 23 mm Xience/theresa point stents.  She was noted to have nonobstructive disease in the LAD and left circumflex system.  Patient was started on aspirin and Plavix and continued on amlodipine, carvedilol, Imdur.  Previous echocardiogram which showed preserved LV systolic function.    Admitted to the hospital in July 2023 with chest pain, was ruled out for acute coronary syndrome.  She had ileus at that time and a large hiatal hernia.  We did add Ranexa at that time.    She has done well.  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  Had a fall and injured her left foot.   Denies any further episodes of chest pain.    Review of Systems - ROS  Constitution: Negative for weakness, weight gain and weight loss.   HENT: Negative for congestion.    Eyes: Negative for blurred vision.   Cardiovascular: As mentioned above  Respiratory: Negative for cough and hemoptysis.    Endocrine: Negative for polydipsia and polyuria.   Hematologic/Lymphatic: Negative for bleeding problem. Does not bruise/bleed easily.   Skin: Negative for flushing.   Musculoskeletal: Negative for neck pain and stiffness.   Gastrointestinal: Negative for abdominal pain, diarrhea, jaundice, melena, nausea and vomiting.   Genitourinary: Negative for dysuria and hematuria.   Neurological:  Negative for dizziness, focal weakness and numbness.   Psychiatric/Behavioral: Negative for altered mental status and depression.     I reviewed the ROS as documented here and confirmed the accuracy of it with the patient today. 8/7/2023        All other systems were reviewed and were negative.    family history includes Alcohol abuse in her father; Cancer in her maternal grandfather, maternal grandmother, mother, and paternal grandfather; Early death (age of onset: 56) in her father; Early death (age of onset: 68) in her mother; Heart disease in her father and mother; Hyperlipidemia in her father and mother; Hypertension in her father and mother.     reports that she quit smoking about 17 years ago. Her smoking use included cigarettes. She has never used smokeless tobacco. She reports that she does not drink alcohol and does not use drugs.    Allergies   Allergen Reactions    Amoxicillin Rash    Darvon [Propoxyphene] Rash         Current Outpatient Medications:     amLODIPine (NORVASC) 5 MG tablet, Take 1 tablet by mouth Daily., Disp: 30 tablet, Rfl: 3    ascorbic acid (VITAMIN C) 1000 MG tablet, Take 1 capsule by mouth Daily., Disp: , Rfl:     aspirin 81 MG chewable tablet, Chew 1 tablet Daily., Disp: , Rfl:     carvedilol (COREG) 3.125 MG tablet, Take 1 tablet by mouth 2 (Two) Times a Day., Disp: 180 tablet, Rfl: 3    clopidogrel (PLAVIX) 75 MG tablet, Take 1 tablet by mouth Daily., Disp: 90 tablet, Rfl: 1    Coenzyme Q10 200 MG capsule, Take 1 tablet by mouth Daily., Disp: , Rfl:     enalapril (VASOTEC) 10 MG tablet, Take 0.5 tablets by mouth Daily As Needed (for SBP >160)., Disp: , Rfl:     FLUoxetine (PROzac) 20 MG capsule, Take 1 capsule by mouth Daily., Disp: , Rfl:     fluticasone (FLONASE) 50 MCG/ACT nasal spray, , Disp: , Rfl:     gabapentin (NEURONTIN) 100 MG capsule, Take 2 capsules by mouth 3 (Three) Times a Day., Disp: , Rfl:     gemfibrozil (Lopid) 600 MG tablet, Take 1 tablet by mouth 2 (Two) Times  a Day Before Meals., Disp: 180 tablet, Rfl: 3    isosorbide mononitrate (IMDUR) 30 MG 24 hr tablet, TAKE 1 TABLET EVERY DAY, Disp: 90 tablet, Rfl: 3    Magnesium Hydroxide (MAGNESIA PO), Take 250 mg by mouth Daily., Disp: , Rfl:     meclizine (ANTIVERT) 25 MG tablet, , Disp: , Rfl:     meloxicam (MOBIC) 15 MG tablet, Take 1 tablet by mouth Daily., Disp: , Rfl:     Omega-3 Fatty Acids (fish oil) 1000 MG capsule capsule, Take 1 capsule by mouth Daily., Disp: , Rfl:     omeprazole (priLOSEC) 40 MG capsule, As Needed., Disp: , Rfl:     ondansetron (ZOFRAN) 4 MG tablet, Take 1 tablet by mouth As Needed., Disp: , Rfl:     pantoprazole (PROTONIX) 40 MG EC tablet, Take 1 tablet by mouth 2 (Two) Times a Day., Disp: , Rfl:     Potassium 99 MG tablet, Take 1 tablet by mouth Every Night., Disp: , Rfl:     pravastatin (PRAVACHOL) 40 MG tablet, Take 1 tablet by mouth Daily., Disp: , Rfl:     Probiotic Product (PROBIOTIC DAILY PO), Daily., Disp: , Rfl:     ranolazine (RANEXA) 500 MG 12 hr tablet, Take 1 tablet by mouth Every 12 (Twelve) Hours., Disp: 60 tablet, Rfl: 3    sucralfate (CARAFATE) 1 g tablet, Take 1 tablet by mouth 4 (Four) Times a Day., Disp: , Rfl:     temazepam (RESTORIL) 30 MG capsule, Take 1 capsule by mouth Every Night., Disp: , Rfl:     vitamin B-12 (CYANOCOBALAMIN) 1000 MCG tablet, Take 1 tablet by mouth Daily., Disp: , Rfl:     vitamin D (ERGOCALCIFEROL) 15778 UNITS capsule capsule, Take 1 capsule by mouth 1 (One) Time Per Week., Disp: , Rfl:     Physical Exam:  Vitals:    08/07/23 0830   BP: 138/68   BP Location: Right arm   Patient Position: Sitting   Cuff Size: Adult   Pulse: 73   SpO2: 99%   PainSc: 0-No pain   PainLoc: Chest     Current Pain Level: none  Pulse Ox: Normal  on room air  General: alert, appears stated age and cooperative     Body Habitus: well-nourished    HEENT: Head: Normocephalic, no lesions, without obvious abnormality. No arcus senilis, xanthelasma or xanthomas.    Neuro: alert,  oriented x3  Pulses: 2+ and symmetric  JVP: Volume/Pulsation: Normal.  Normal waveforms.   Appropriate inspiratory decrease.  No Kussmaul's. No Anish's.   Carotid Exam: no bruit normal pulsation bilaterally   Carotid Volume: normal.     Respirations: no increased work of breathing   Chest:  Normal    Pulmonary:Normal   Precordium: Normal impulses. P2 is not palpable.  RV Heave: absent  LV Heave: absent  [place]:  normal size and placement  Palpable S4: absent.  Heart rate: normal    Heart Rhythm: regular     Heart Sounds: S1: normal  S2: normal  S3: absent   S4: absent  Opening Snap: absent    Pericardial Rub:  Absent: .    Abdomen:   Appearance: normal .  Palpation: Soft, non-tender to palpation, bowel sounds positive in all four quadrants; no guarding or rebound tenderness  Extremity: no edema.   LE Skin: no rashes  LE Hair:  normal  LE Pulses: well perfused with normal pulses in the distal extremities  Pallor on elevation: Absent. Rubor on dependency: None    I have reexamined the patient and the results are consistent with the previously documented exam. Ene Garner MD     DATA REVIEWED:     EKG. I personally reviewed and interpreted the EKG.  normal EKG, normal sinus rhythm    ECG/EMG Results (all)       Procedure Component Value Units Date/Time    ECG 12 Lead [[id number]] Collected: 05/25/23 0945     Updated: 05/25/23 0947     QT Interval 436 ms      QTC Interval 442 ms     Narrative:      Test Reason : htn  Blood Pressure :   */*   mmHG  Vent. Rate :  62 BPM     Atrial Rate :  62 BPM     P-R Int : 156 ms          QRS Dur :  90 ms      QT Int : 436 ms       P-R-T Axes :  71 -19  52 degrees     QTc Int : 442 ms    Normal sinus rhythm  Normal ECG  When compared with ECG of 12-APR-2023 06:59,  Nonspecific T wave abnormality now evident in Lateral leads    Referred By:            Confirmed By:           ---------------------------------------------------  TTE/DAHIANA:  Results for orders placed during the hospital  encounter of 07/16/23    Adult Transthoracic Echo Limited W/ Cont if Necessary Per Protocol    Interpretation Summary    Left ventricular systolic function is normal. Left ventricular ejection fraction appears to be 61 - 65%.    Left ventricular diastolic function was not assessed.    Estimated right ventricular systolic pressure from tricuspid regurgitation is normal (<35 mmHg).    : There is no evidence of pericardial effusion.        --------------------------------------------------------------------------------------------------  LABS:     The CVD Risk score (Mike et al., 2008) failed to calculate for the following reasons:    The 2008 CVD risk score is only valid for ages 30 to 74         Lab Results   Component Value Date    GLUCOSE 98 07/18/2023    BUN 7 (L) 07/18/2023    CREATININE 0.78 07/18/2023    EGFRIFNONA 56 (L) 06/15/2021    BCR 9.0 07/18/2023    K 4.0 07/18/2023    CO2 22.0 07/18/2023    CALCIUM 8.8 07/18/2023    ALBUMIN 4.0 07/16/2023    AST 14 07/16/2023    ALT 6 07/16/2023     Lab Results   Component Value Date    WBC 5.37 07/18/2023    HGB 10.6 (L) 07/18/2023    HCT 31.6 (L) 07/18/2023    MCV 81.2 07/18/2023     07/18/2023     Lab Results   Component Value Date    CHOL 144 06/15/2021    TRIG 104 06/15/2021    HDL 55 06/15/2021    LDL 70 06/15/2021     Lab Results   Component Value Date    TSH 2.63 03/03/2014     Lab Results   Component Value Date    CKTOTAL 54 06/26/2016    CKMB 1.1 06/26/2016    TROPONINI <0.012 06/26/2016    TROPONINT 8 07/16/2023     No results found for: HGBA1C  No results found for: DDIMER  Lab Results   Component Value Date    ALT 6 07/16/2023     No results found for: HGBA1C  Lab Results   Component Value Date    CREATININE 0.78 07/18/2023     Lab Results   Component Value Date    IRON 69 05/10/2023    TIBC 319 05/10/2023    FERRITIN 58.16 05/10/2023     Lab Results   Component Value Date    INR 1.03 04/11/2023    INR 0.98 03/24/2017    INR 1.0 06/26/2016     PROTIME 13.4 04/11/2023    PROTIME 12.9 03/24/2017    PROTIME 13.6 06/26/2016       [unfilled]    1.  Coronary artery disease:  Status post PCI to RCA in April 2023.  Mild to moderate disease in LAD and left circumflex system which was treated conservatively.  Continue aspirin/Plavix/Imdur/Coreg/Ranexa  Chest pain-free.  Echocardiogram showed preserved LV systolic function.  Pericardial effusion had resolved    2.  Hypertension: Well-controlled on current regimen    3.  Hyperlipidemia:  On pravastatin.  Previous LDL was 70.  Target LDL less than 55.        Prevention:  BMI is within normal parameters. No other follow-up for BMI required.      Fouzia Maldonado  reports that she quit smoking about 17 years ago. Her smoking use included cigarettes. She has never used smokeless tobacco..        This document has been electronically signed by Ene Garner MD on August 7, 2023 08:36 CDT

## 2023-08-15 ENCOUNTER — TELEPHONE (OUTPATIENT)
Dept: CARDIOLOGY | Facility: CLINIC | Age: 73
End: 2023-08-15
Payer: MEDICARE

## 2023-08-15 RX ORDER — RANOLAZINE 500 MG/1
500 TABLET, EXTENDED RELEASE ORAL EVERY 12 HOURS SCHEDULED
Qty: 60 TABLET | Refills: 11 | Status: SHIPPED | OUTPATIENT
Start: 2023-08-15

## 2023-08-15 NOTE — TELEPHONE ENCOUNTER
----- Message from Ene Garner MD sent at 8/14/2023  7:03 PM CDT -----  Regarding: RE: refill  yes  ----- Message -----  From: Epley, Kendall, MA  Sent: 8/14/2023   2:24 PM CDT  To: Ene Garner MD  Subject: FW: refill                                       Okay to refill?     ----- Message -----  From: Fiordaliza Nicole RegSched Rep  Sent: 8/14/2023   2:19 PM CDT  To: Kendall Epley, MA  Subject: refill                                           She is needing a refill of Ranexa 500 MG sent to [facility] in [place]. Thanks

## 2023-09-12 ENCOUNTER — TELEPHONE (OUTPATIENT)
Dept: ONCOLOGY | Facility: CLINIC | Age: 73
End: 2023-09-12

## 2023-09-12 ENCOUNTER — OFFICE VISIT (OUTPATIENT)
Dept: ONCOLOGY | Facility: CLINIC | Age: 73
End: 2023-09-12
Payer: MEDICARE

## 2023-09-12 ENCOUNTER — LAB (OUTPATIENT)
Dept: ONCOLOGY | Facility: HOSPITAL | Age: 73
End: 2023-09-12
Payer: MEDICARE

## 2023-09-12 VITALS
BODY MASS INDEX: 22.86 KG/M2 | WEIGHT: 146 LBS | SYSTOLIC BLOOD PRESSURE: 135 MMHG | HEART RATE: 76 BPM | OXYGEN SATURATION: 97 % | DIASTOLIC BLOOD PRESSURE: 62 MMHG | RESPIRATION RATE: 18 BRPM

## 2023-09-12 DIAGNOSIS — D50.9 IRON DEFICIENCY ANEMIA, UNSPECIFIED IRON DEFICIENCY ANEMIA TYPE: Primary | ICD-10-CM

## 2023-09-12 DIAGNOSIS — D50.9 IRON DEFICIENCY ANEMIA, UNSPECIFIED IRON DEFICIENCY ANEMIA TYPE: ICD-10-CM

## 2023-09-12 LAB
DEPRECATED RDW RBC AUTO: 41.2 FL (ref 37–54)
ERYTHROCYTE [DISTWIDTH] IN BLOOD BY AUTOMATED COUNT: 14.1 % (ref 12.3–15.4)
FERRITIN SERPL-MCNC: 24.72 NG/ML (ref 13–150)
HCT VFR BLD AUTO: 31.8 % (ref 34–46.6)
HGB BLD-MCNC: 10.3 G/DL (ref 12–15.9)
HOLD SPECIMEN: NORMAL
IRON 24H UR-MRATE: 35 MCG/DL (ref 37–145)
IRON SATN MFR SERPL: 11 % (ref 20–50)
MCH RBC QN AUTO: 25.9 PG (ref 26.6–33)
MCHC RBC AUTO-ENTMCNC: 32.4 G/DL (ref 31.5–35.7)
MCV RBC AUTO: 79.9 FL (ref 79–97)
PLATELET # BLD AUTO: 314 10*3/MM3 (ref 140–450)
PMV BLD AUTO: 9.8 FL (ref 6–12)
RBC # BLD AUTO: 3.98 10*6/MM3 (ref 3.77–5.28)
TIBC SERPL-MCNC: 323 MCG/DL (ref 298–536)
TRANSFERRIN SERPL-MCNC: 217 MG/DL (ref 200–360)
WBC NRBC COR # BLD: 8.56 10*3/MM3 (ref 3.4–10.8)

## 2023-09-12 PROCEDURE — 85027 COMPLETE CBC AUTOMATED: CPT | Performed by: INTERNAL MEDICINE

## 2023-09-12 PROCEDURE — 84466 ASSAY OF TRANSFERRIN: CPT | Performed by: INTERNAL MEDICINE

## 2023-09-12 PROCEDURE — G0463 HOSPITAL OUTPT CLINIC VISIT: HCPCS | Performed by: INTERNAL MEDICINE

## 2023-09-12 PROCEDURE — 83540 ASSAY OF IRON: CPT | Performed by: INTERNAL MEDICINE

## 2023-09-12 PROCEDURE — 82728 ASSAY OF FERRITIN: CPT | Performed by: INTERNAL MEDICINE

## 2023-09-12 RX ORDER — FAMOTIDINE 10 MG/ML
20 INJECTION, SOLUTION INTRAVENOUS AS NEEDED
OUTPATIENT
Start: 2023-09-25

## 2023-09-12 RX ORDER — SODIUM CHLORIDE 9 MG/ML
250 INJECTION, SOLUTION INTRAVENOUS ONCE
OUTPATIENT
Start: 2023-09-25

## 2023-09-12 RX ORDER — DIPHENHYDRAMINE HYDROCHLORIDE 50 MG/ML
50 INJECTION INTRAMUSCULAR; INTRAVENOUS AS NEEDED
OUTPATIENT
Start: 2023-09-25

## 2023-09-12 NOTE — PROGRESS NOTES
"Chief Complaint  Follow-up -iron deficiency anemia    Subjective        Fouzia Zuleika Maldonado presents to [facility] HEMATOLOGY & ONCOLOGY  History of Present Illness    No new health issues since last visit.   No new medications.   No new hospitalization.   Reports fatigue.      Objective   Vital Signs:  /62   Pulse 76   Resp 18   Wt 66.2 kg (146 lb)   SpO2 97%   BMI 22.86 kg/m²   Estimated body mass index is 22.86 kg/m² as calculated from the following:    Height as of 7/17/23: 170.2 cm (67.01\").    Weight as of this encounter: 66.2 kg (146 lb).       BMI is within normal parameters. No other follow-up for BMI required.      Physical Exam  Vitals and nursing note reviewed.   Constitutional:       Appearance: Normal appearance.   Neurological:      General: No focal deficit present.      Mental Status: She is alert and oriented to person, place, and time. Mental status is at baseline.   Psychiatric:         Mood and Affect: Mood normal.         Behavior: Behavior normal.         Thought Content: Thought content normal.      Result Review :  The following data was reviewed by: Shukri Jones MD on 09/12/2023:  Common labs          7/17/2023    05:24 7/18/2023    05:18 9/12/2023    13:00   Common Labs   Glucose 89  98     BUN 6  7     Creatinine 0.64  0.78     Sodium 132  136     Potassium 3.9  4.0     Chloride 100  105     Calcium 8.9  8.8     WBC 4.89  5.37  8.56    Hemoglobin 11.3  10.6  10.3    Hematocrit 33.1  31.6  31.8    Platelets 296  288  314      CMP          7/16/2023    13:36 7/17/2023    05:24 7/18/2023    05:18   CMP   Glucose 101  89  98    BUN 8  6  7    Creatinine 0.85  0.64  0.78    EGFR 71.5  92.3  79.3    Sodium 124  132  136    Potassium 4.1  3.9  4.0    Chloride 91  100  105    Calcium 9.6  8.9  8.8    Total Protein 7.3      Albumin 4.0      Globulin 3.3      Total Bilirubin 0.7      Alkaline Phosphatase 95      AST (SGOT) 14      ALT (SGPT) 6    "   Albumin/Globulin Ratio 1.2      BUN/Creatinine Ratio 9.4  9.4  9.0    Anion Gap 12.0  10.0  9.0      CBC          7/17/2023    05:24 7/18/2023    05:18 9/12/2023    13:00   CBC   WBC 4.89  5.37  8.56    RBC 4.10  3.89  3.98    Hemoglobin 11.3  10.6  10.3    Hematocrit 33.1  31.6  31.8    MCV 80.7  81.2  79.9    MCH 27.6  27.2  25.9    MCHC 34.1  33.5  32.4    RDW 12.3  12.8  14.1    Platelets 296  288  314      CBC w/diff          7/17/2023    05:24 7/18/2023    05:18 9/12/2023    13:00   CBC w/Diff   WBC 4.89  5.37  8.56    RBC 4.10  3.89  3.98    Hemoglobin 11.3  10.6  10.3    Hematocrit 33.1  31.6  31.8    MCV 80.7  81.2  79.9    MCH 27.6  27.2  25.9    MCHC 34.1  33.5  32.4    RDW 12.3  12.8  14.1    Platelets 296  288  314    Neutrophil Rel % 60.3  61.8     Immature Granulocyte Rel % 0.4  0.4     Lymphocyte Rel % 23.7  22.7     Monocyte Rel % 12.7  12.8     Eosinophil Rel % 2.5  1.7     Basophil Rel % 0.4  0.6       Anemia labs:      Lab 09/12/23  1300   IRON 35*   IRON SATURATION (TSAT) 11*   TIBC 323   TRANSFERRIN 217   FERRITIN 24.72      Fouzia Pryoralice Maldonado reports a pain score of 0.  Given her pain assessment as noted, treatment options were discussed and the following options were decided upon as a follow-up plan to address the patient's pain: continuation of current treatment plan for pain.    Patient screened negative for depression based on a PHQ-9 score of 0 on 9/12/2023.     Advance Care Planning   ACP discussion was declined by the patient. Patient does not have an advance directive, declines further assistance.         Assessment and Plan   Diagnoses and all orders for this visit:    1. Iron deficiency anemia, unspecified iron deficiency anemia type (Primary)  -     CBC (No Diff); Future  -     Ferritin; Future  -     Iron Profile; Future    Chronic issue with exacerbation  Iron deficiency anemia is worse.  No obvious bleeding.  Up-to-date on GI work-up.  Unable to tolerate oral iron due to GI  side effects.  Due to this reason I believe she will benefit from IV iron treatment.    I had an extensive discussion with patient about diagnosis and treatment options. I recommend that we replace their iron with IV Venofer - 200 mg x 3 infusions.     I had an extensive discussion with the patient about risk versus benefits of IV iron treatment.    I discussed about various risks associated with IV iron such as allergic reaction, hypersensitivity reaction, headache, flushing, joint aches or pains, local IV infiltration and skin discoloration.  After our discussion the patient was in agreement in  proceeding with IV iron treatment for  anemia.      I will see her back in 4 months with CBC, ferritin, iron profile.    Other orders  -     sodium chloride 0.9 % infusion 250 mL  -     iron sucrose (VENOFER) 200 mg in sodium chloride 0.9 % 250 mL IVPB  -     Hydrocortisone Sod Suc (PF) (Solu-CORTEF) injection 50 mg  -     Hydrocortisone Sod Suc (PF) (Solu-CORTEF) injection 100 mg  -     diphenhydrAMINE (BENADRYL) injection 50 mg  -     famotidine (PEPCID) injection 20 mg             Follow Up   No follow-ups on file.  Patient was given instructions and counseling regarding her condition or for health maintenance advice. Please see specific information pulled into the AVS if appropriate.

## 2023-09-12 NOTE — TELEPHONE ENCOUNTER
----- Message from Shukri Jones MD sent at 9/12/2023  1:30 PM CDT -----  Iron is low. Arrange for 3 venofer infusions.

## 2023-09-12 NOTE — TELEPHONE ENCOUNTER
Called pt and made aware per Dr. Sheets iron is low and needs 3 Venofer infusions. Made pt aware will call to schedule those appt. V/u obtained.

## 2023-09-19 ENCOUNTER — INFUSION (OUTPATIENT)
Dept: ONCOLOGY | Facility: HOSPITAL | Age: 73
End: 2023-09-19
Payer: MEDICARE

## 2023-09-19 VITALS — SYSTOLIC BLOOD PRESSURE: 125 MMHG | DIASTOLIC BLOOD PRESSURE: 60 MMHG | HEART RATE: 80 BPM

## 2023-09-19 DIAGNOSIS — D50.9 IRON DEFICIENCY ANEMIA, UNSPECIFIED IRON DEFICIENCY ANEMIA TYPE: Primary | ICD-10-CM

## 2023-09-19 PROCEDURE — 25010000002 IRON SUCROSE PER 1 MG: Performed by: INTERNAL MEDICINE

## 2023-09-19 PROCEDURE — 96374 THER/PROPH/DIAG INJ IV PUSH: CPT | Performed by: INTERNAL MEDICINE

## 2023-09-19 RX ORDER — FAMOTIDINE 10 MG/ML
20 INJECTION, SOLUTION INTRAVENOUS AS NEEDED
Status: CANCELLED | OUTPATIENT
Start: 2023-09-19

## 2023-09-19 RX ORDER — DIPHENHYDRAMINE HYDROCHLORIDE 50 MG/ML
50 INJECTION INTRAMUSCULAR; INTRAVENOUS AS NEEDED
Status: DISCONTINUED | OUTPATIENT
Start: 2023-09-19 | End: 2023-09-19 | Stop reason: HOSPADM

## 2023-09-19 RX ORDER — SODIUM CHLORIDE 9 MG/ML
250 INJECTION, SOLUTION INTRAVENOUS ONCE
Status: COMPLETED | OUTPATIENT
Start: 2023-09-19 | End: 2023-09-19

## 2023-09-19 RX ORDER — FAMOTIDINE 10 MG/ML
20 INJECTION, SOLUTION INTRAVENOUS AS NEEDED
Status: DISCONTINUED | OUTPATIENT
Start: 2023-09-19 | End: 2023-09-19 | Stop reason: HOSPADM

## 2023-09-19 RX ORDER — DIPHENHYDRAMINE HYDROCHLORIDE 50 MG/ML
50 INJECTION INTRAMUSCULAR; INTRAVENOUS AS NEEDED
Status: CANCELLED | OUTPATIENT
Start: 2023-09-19

## 2023-09-19 RX ORDER — SODIUM CHLORIDE 9 MG/ML
250 INJECTION, SOLUTION INTRAVENOUS ONCE
Status: CANCELLED | OUTPATIENT
Start: 2023-09-19

## 2023-09-19 RX ADMIN — SODIUM CHLORIDE 250 ML: 9 INJECTION, SOLUTION INTRAVENOUS at 13:53

## 2023-09-19 RX ADMIN — IRON SUCROSE 200 MG: 20 INJECTION, SOLUTION INTRAVENOUS at 13:54

## 2023-09-21 ENCOUNTER — INFUSION (OUTPATIENT)
Dept: ONCOLOGY | Facility: HOSPITAL | Age: 73
End: 2023-09-21
Payer: MEDICARE

## 2023-09-21 VITALS — SYSTOLIC BLOOD PRESSURE: 112 MMHG | DIASTOLIC BLOOD PRESSURE: 58 MMHG | HEART RATE: 81 BPM

## 2023-09-21 DIAGNOSIS — D50.9 IRON DEFICIENCY ANEMIA, UNSPECIFIED IRON DEFICIENCY ANEMIA TYPE: Primary | ICD-10-CM

## 2023-09-21 PROCEDURE — 25010000002 IRON SUCROSE PER 1 MG: Performed by: INTERNAL MEDICINE

## 2023-09-21 PROCEDURE — 96374 THER/PROPH/DIAG INJ IV PUSH: CPT | Performed by: INTERNAL MEDICINE

## 2023-09-21 RX ORDER — FAMOTIDINE 10 MG/ML
20 INJECTION, SOLUTION INTRAVENOUS AS NEEDED
Status: DISCONTINUED | OUTPATIENT
Start: 2023-09-21 | End: 2023-09-21 | Stop reason: HOSPADM

## 2023-09-21 RX ORDER — SODIUM CHLORIDE 9 MG/ML
250 INJECTION, SOLUTION INTRAVENOUS ONCE
Status: COMPLETED | OUTPATIENT
Start: 2023-09-21 | End: 2023-09-21

## 2023-09-21 RX ORDER — FAMOTIDINE 10 MG/ML
20 INJECTION, SOLUTION INTRAVENOUS AS NEEDED
Status: CANCELLED | OUTPATIENT
Start: 2023-09-21

## 2023-09-21 RX ORDER — DIPHENHYDRAMINE HYDROCHLORIDE 50 MG/ML
50 INJECTION INTRAMUSCULAR; INTRAVENOUS AS NEEDED
Status: DISCONTINUED | OUTPATIENT
Start: 2023-09-21 | End: 2023-09-21 | Stop reason: HOSPADM

## 2023-09-21 RX ORDER — DIPHENHYDRAMINE HYDROCHLORIDE 50 MG/ML
50 INJECTION INTRAMUSCULAR; INTRAVENOUS AS NEEDED
Status: CANCELLED | OUTPATIENT
Start: 2023-09-21

## 2023-09-21 RX ORDER — SODIUM CHLORIDE 9 MG/ML
250 INJECTION, SOLUTION INTRAVENOUS ONCE
Status: CANCELLED | OUTPATIENT
Start: 2023-09-21

## 2023-09-21 RX ADMIN — IRON SUCROSE 200 MG: 20 INJECTION, SOLUTION INTRAVENOUS at 13:23

## 2023-09-21 RX ADMIN — SODIUM CHLORIDE 250 ML: 9 INJECTION, SOLUTION INTRAVENOUS at 13:23

## 2023-09-25 ENCOUNTER — INFUSION (OUTPATIENT)
Dept: ONCOLOGY | Facility: HOSPITAL | Age: 73
End: 2023-09-25

## 2023-09-25 DIAGNOSIS — D50.9 IRON DEFICIENCY ANEMIA, UNSPECIFIED IRON DEFICIENCY ANEMIA TYPE: Primary | ICD-10-CM

## 2023-09-25 PROCEDURE — 96374 THER/PROPH/DIAG INJ IV PUSH: CPT | Performed by: NURSE PRACTITIONER

## 2023-09-25 PROCEDURE — 25010000002 IRON SUCROSE PER 1 MG: Performed by: INTERNAL MEDICINE

## 2023-09-25 RX ORDER — FAMOTIDINE 10 MG/ML
20 INJECTION, SOLUTION INTRAVENOUS AS NEEDED
Status: CANCELLED | OUTPATIENT
Start: 2023-09-25

## 2023-09-25 RX ORDER — DIPHENHYDRAMINE HYDROCHLORIDE 50 MG/ML
50 INJECTION INTRAMUSCULAR; INTRAVENOUS AS NEEDED
Status: CANCELLED | OUTPATIENT
Start: 2023-09-25

## 2023-09-25 RX ORDER — DIPHENHYDRAMINE HYDROCHLORIDE 50 MG/ML
50 INJECTION INTRAMUSCULAR; INTRAVENOUS AS NEEDED
Status: DISCONTINUED | OUTPATIENT
Start: 2023-09-25 | End: 2023-09-25 | Stop reason: HOSPADM

## 2023-09-25 RX ORDER — SODIUM CHLORIDE 9 MG/ML
250 INJECTION, SOLUTION INTRAVENOUS ONCE
Status: COMPLETED | OUTPATIENT
Start: 2023-09-25 | End: 2023-09-25

## 2023-09-25 RX ORDER — SODIUM CHLORIDE 9 MG/ML
250 INJECTION, SOLUTION INTRAVENOUS ONCE
Status: CANCELLED | OUTPATIENT
Start: 2023-09-25

## 2023-09-25 RX ORDER — FAMOTIDINE 10 MG/ML
20 INJECTION, SOLUTION INTRAVENOUS AS NEEDED
Status: DISCONTINUED | OUTPATIENT
Start: 2023-09-25 | End: 2023-09-25 | Stop reason: HOSPADM

## 2023-09-25 RX ADMIN — IRON SUCROSE 200 MG: 20 INJECTION, SOLUTION INTRAVENOUS at 13:41

## 2023-09-25 RX ADMIN — SODIUM CHLORIDE 250 ML: 9 INJECTION, SOLUTION INTRAVENOUS at 13:39

## 2023-09-27 RX ORDER — CLOPIDOGREL BISULFATE 75 MG/1
TABLET ORAL
Qty: 90 TABLET | Refills: 1 | Status: SHIPPED | OUTPATIENT
Start: 2023-09-27

## 2024-03-29 ENCOUNTER — TELEPHONE (OUTPATIENT)
Dept: GENETICS | Facility: HOSPITAL | Age: 74
End: 2024-03-29
Payer: MEDICARE

## 2024-03-29 NOTE — TELEPHONE ENCOUNTER
Called pt to remind her of her upcoming genetic counseling appt on Tuesday. Pt is aware this appt is by phone.Left message asking pt to call me back to review family history prior to appt.

## 2024-04-02 ENCOUNTER — TELEPHONE (OUTPATIENT)
Dept: GENETICS | Facility: HOSPITAL | Age: 74
End: 2024-04-02
Payer: MEDICARE

## 2024-04-02 ENCOUNTER — CLINICAL SUPPORT (OUTPATIENT)
Dept: GENETICS | Facility: HOSPITAL | Age: 74
End: 2024-04-02

## 2024-04-02 DIAGNOSIS — Z80.52 FAMILY HISTORY OF BLADDER CANCER: ICD-10-CM

## 2024-04-02 DIAGNOSIS — Z80.0 FAMILY HISTORY OF COLON CANCER: ICD-10-CM

## 2024-04-02 DIAGNOSIS — C18.9 MALIGNANT NEOPLASM OF COLON, UNSPECIFIED PART OF COLON: ICD-10-CM

## 2024-04-02 DIAGNOSIS — Z13.79 GENETIC TESTING: Primary | ICD-10-CM

## 2024-04-02 DIAGNOSIS — Z80.41 FAMILY HISTORY OF OVARIAN CANCER: ICD-10-CM

## 2024-04-02 NOTE — PROGRESS NOTES
Fouzia Maldonado is a 73 y.o. female who was referred for genetic counseling due to a personal history of colon cancer. Genetic counseling was performed via telephone. Ms. Maldonado confirmed her full name, date of birth, and that she was physically located in the [facility] at the time of the appointment. She was recently diagnosed with colon cancer which was found to be invasive to the bladder. She reports she is not able to undergo surgery due to her health, so she is currently undergoing immunotherapy instead of surgery to try to shrink the tumor. She reports she has a previous history of around 4-5 colon polyps and that she previously had a colonoscopy recall of every 5 years. She had a total hysterectomy at age 26 due to abnormal bleeding. MMR deficiency screening was performed by IHC on the colon tumor specimen and showed absent MLH1, PMS2, and MSH6.  With tumors deficient in MLH1 and PMS2, this specific staining pattern indicates a 20% baseline risk for Mohan syndrome.  NCCN guidelines recommends additional tumor testing if MLH1 and PMS2 are absent, through MLH1 promoter methylation and/or BRAF mutation testing. It does not appear that MLH1 methylation analysis or BRAF testing was completed.  We reviewed this information as well as Ms. Maldonado's family history.Ms. Maldonado was interested in discussing her risk for a hereditary cancer syndrome, and decided to pursue genetic testing. The CancerNext panel was ordered through [email or web address] which analyzes 34 genes associated with an increased cancer risk. She plans to have her blood drawn on 4/12/2024 for testing at [facility]. We have sent her information back to [facility] to coordinate this blood draw with her. Results are expected 2-3 weeks after the lab receives the sample.     PERTINENT FAMILY HISTORY:  Mother   Colon cancer, 66  Brother 1:   Colon cancer, 61     Throat cancer. 60s-70s  Brother 2:   Liver cancer  Mat. Half-Brother:   Colon cancer, 40s     Throat cancer, 40s  Sister:    Bladder cancer, 64  Niece:    Ovarian cancer, 13  Mat. Cousin 1:  Ovarian cancer  Mat. Cousin 2:  Colon cancer  Mat. Grandmother:  Stomach cancer  Pat. Grandmother:  Colon cancer, 70s     Melanoma, 70s  Pat. Grandfather:  Stomach cancer, 70s  Pat. Cousins (x2):  Colon cancer    Medical records regarding the diagnoses in the family were not available for review.     RISK ASSESSMENT:  Ms. Maldonado had abnormal IHC screening on her colon cancer specimen submitted to pathology.  Approximately 20% of individuals with absent MLH1 and PMS2 on IHC screening are found to have a germline mutation in MLH1.  In the other 80% of individuals, MLH1 promoter hypermethylation is typically identified, providing an explanation for the abnormal IHC. MLH1 promoter methylation testing does not appear to have been performed. Ms. Maldonado's personal history of colon cancer in addition to the strong family history of colon cancer led to concern for a hereditary cancer syndrome. she clearly meets NCCN criteria for Mohan syndrome testing based on her abnormal IHC screening. We discussed multigene panel testing that would evaluate multiple genes simultaneously associated with hereditary cancer risk. This risk assessment is based on the family history information provided at the time of the appointment and could change in the future should new information be obtained.    GENETIC COUNSELING (30 minutes) We reviewed the family history information in detail.  Cases of cancer follow three general patterns: sporadic, familial, and hereditary.  While most cancer is sporadic, some cases appear to occur in family clusters.  These cases are said to be familial and account for 10-20% of cancer cases.  Familial cases may be due to a combination of shared genes and environmental factors among family members.  In even fewer families, the cancer is said to be inherited, and the genes responsible for the  cancer are known.      Family histories typical of hereditary cancer syndromes usually include multiple first- and second-degree relatives diagnosed with cancer types that define a syndrome.  These cases tend to be diagnosed at younger-than-expected ages and can be bilateral or multifocal.  The cancer in these families follows an autosomal dominant inheritance pattern, which indicates the likely presence of a mutation in a cancer susceptibility gene.  Children and siblings of an individual believed to carry this mutation have a 50% chance of inheriting that mutation, thereby inheriting the increased risk to develop cancer.  These mutations can be passed down from the maternal or the paternal lineage.    We discussed that Mohan syndrome is the most common form of hereditary colon cancer. It is an autosomal dominant condition caused by mutations in mismatch repair genes.  The lifetime risk for colon cancer for individuals with Mohan syndrome is up to 80% if there is no intervention (i.e. removal of polyps detected on colonoscopy).  Frequently (60-70% of the time) the polyps and colon cancer in Mohan syndrome arise on the right side of the colon. Routine screening colonoscopy has been shown to significantly reduce the incidence and mortality of colon cancer in families with Mohan syndrome. Other risks include endometrial cancer (up to 60%), as well as other cancers (ovarian, upper GI, brain, stomach, pancreatic). We discussed the national management guidelines that have been established for individuals known to have Mohan syndrome.        There are other genes that are known to be associated with an increased risk for cancer.  Some of these genes have well defined risks and established management guidelines.  Other genes that can be tested for have been more recently described, and there may be less data regarding the risks and therefore may not have established management guidelines. Based on Ms. Maldonado's desire to  get as much information as possible regarding her personal risks and potential risks for her family, she opted to pursue testing through a panel that would evaluate multiple genes that have been associated with cancer risk.     GENETIC TESTING:  The risks, benefits and limitations of genetic testing and implications for clinical management following testing were reviewed.  DNA test results can influence decisions regarding screening, prevention and surgical management.  Genetic testing can have significant psychological implications for both individuals and families.  Also discussed was the possibility of employment and insurance discrimination based on genetic test results and the laws in place to prevent this ([place]).    We discussed panel testing that would evaluate 34 genes associated with increased cancer risk. The implications of a positive or negative test result were discussed. We discussed the possibility that, in some cases, genetic test results may be informative or may be ambiguous due to the identification of a genetic variant. These variants may or may not be associated with an increased cancer risk.  With multigene panel testing, it is not uncommon for a variant of uncertain significance (VUS) to be identified.  If a VUS is identified, testing unaffected family members is typically not recommended and screening recommendations are made based on the family history.  The laboratories that perform genetic testing work to reclassify the VUS and send out an amended report if and when a VUS is reclassified.  The majority of variant findings are ultimately reclassified to a negative result.  Given her personal and family history, a negative test result would not eliminate all risk to her relatives.      PLAN: Genetic testing was ordered through [employer]. She's planning to have her blood drawn on 4/12/2024. Results are expected in 2-3 weeks. Ms. Maldonado will be contacted by telephone to discuss results at that  time. she is welcome to contact us in the meantime with any questions or concerns at [phone].    Rebekah Harding MS, [facility] [place]  Licensed Certified Genetic Counselor

## 2024-04-02 NOTE — TELEPHONE ENCOUNTER
Called pt to return her missed VM. Left VM asking pt to call me back and reminded her of her appt later today.

## 2024-05-07 ENCOUNTER — DOCUMENTATION (OUTPATIENT)
Dept: GENETICS | Facility: HOSPITAL | Age: 74
End: 2024-05-07
Payer: MEDICARE
